# Patient Record
Sex: FEMALE | Race: WHITE | Employment: FULL TIME | URBAN - METROPOLITAN AREA
[De-identification: names, ages, dates, MRNs, and addresses within clinical notes are randomized per-mention and may not be internally consistent; named-entity substitution may affect disease eponyms.]

---

## 2020-12-04 ENCOUNTER — OFFICE VISIT (OUTPATIENT)
Dept: NEUROLOGY | Age: 50
End: 2020-12-04
Payer: COMMERCIAL

## 2020-12-04 VITALS
SYSTOLIC BLOOD PRESSURE: 110 MMHG | HEIGHT: 67 IN | WEIGHT: 204.3 LBS | RESPIRATION RATE: 18 BRPM | HEART RATE: 83 BPM | DIASTOLIC BLOOD PRESSURE: 74 MMHG | OXYGEN SATURATION: 98 % | BODY MASS INDEX: 32.07 KG/M2 | TEMPERATURE: 97 F

## 2020-12-04 DIAGNOSIS — M54.12 RADICULOPATHY, CERVICAL REGION: Primary | ICD-10-CM

## 2020-12-04 PROCEDURE — 99204 OFFICE O/P NEW MOD 45 MIN: CPT | Performed by: SPECIALIST

## 2020-12-04 RX ORDER — GABAPENTIN 100 MG/1
CAPSULE ORAL
Qty: 90 CAP | Refills: 1 | Status: SHIPPED | OUTPATIENT
Start: 2020-12-04

## 2020-12-04 RX ORDER — IBUPROFEN 200 MG
400 TABLET ORAL AS NEEDED
COMMUNITY

## 2020-12-04 RX ORDER — AMMONIUM LACTATE 12 G/100G
LOTION TOPICAL AS NEEDED
COMMUNITY

## 2020-12-04 RX ORDER — CHOLECALCIFEROL (VITAMIN D3) 125 MCG
CAPSULE ORAL AS NEEDED
COMMUNITY

## 2020-12-04 RX ORDER — ACETAMINOPHEN 500 MG
1000 TABLET ORAL AS NEEDED
COMMUNITY

## 2020-12-04 RX ORDER — CETIRIZINE HYDROCHLORIDE 10 MG/1
CAPSULE, LIQUID FILLED ORAL
COMMUNITY
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
## 2020-12-04 NOTE — PATIENT INSTRUCTIONS
RESULT POLICY      If we have ordered testing for you, know that; \"NO NEWS IS GOOD NEWS! \"     It is our policy that we no longer call patients with results, nor do we  give test results over the phone. We schedule follow up appointments so that your results can be discussed in person. This allows you to address any questions you have regarding the results. If you choose to go to an imaging center outside of [facility], it is your responsibility to bring imaging report and disc to follow up appointment. If something of concern is revealed on your test, we will contact you to discuss the matter and if needed schedule a sooner follow up appointment. Additionally, results may be found by using the My Chart feature and one of our patient service representatives at the  can give you instructions on how to access this feature to utilize our electronic medical record system. Thank you for your understanding. Patient history reviewed patient examined. An interesting story line and I guess the symptoms including right neck and shoulder discomfort pinpoint more of a radicular pattern of injury although carpal tunnel could be weakly in the running as another possibility. Will refer to PT issue lowest dose gabapentin as needed and warned of sedation. We will set up EMG and nerve conduction and see if we can differentiate at what level the right upper extremity and neck symptoms appear to originate from. [facility]  Further suggestions could follow.

## 2020-12-04 NOTE — LETTER
12/4/20 Patient: Andrea Walter YOB: 1970 Date of Visit: 12/4/2020 [address] [place] [address] [phone] [id number] VIA Facsimile: [phone] Dear Alysia Ortiz DO, Thank you for referring Ms. Andera Walter to [facility] for evaluation. My notes for this consultation are attached. If you have questions, please do not hesitate to call me. I look forward to following your patient along with you.  
 
 
Sincerely, 
 
Gina Horner MD

## 2020-12-15 ENCOUNTER — OFFICE VISIT (OUTPATIENT)
Dept: NEUROLOGY | Age: 50
End: 2020-12-15

## 2020-12-15 VITALS — TEMPERATURE: 96.8 F

## 2020-12-15 DIAGNOSIS — M54.12 CERVICAL RADICULOPATHY AT C6: Primary | ICD-10-CM
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
## 2020-12-15 NOTE — PROGRESS NOTES
EMG/ NCS Report  [facility] - INPATIENT  [place]  [place], [address] [place], [address]   Ph: [phone]   FAX: [phone]/ [phone]  Test Date:  12/15/2020    Patient: Antonia Avila : 1970 Physician: Rebecca Hutchison MD   Sex: Female Height: ' \" Ref Phys: Rigo House IV, MD   ID#: [id number] Weight:  lbs. Technician: Sheree Ugarte     Patient History / Exam:  CC:INDEX AND THUMB PAIN FOR THE LAST 3 WEEKS. PT. ALSO C/O OF SHOULDER PAIN. EMG & NCV Findings:  Evaluation of the right median motor nerve showed normal distal onset latency (3.7 ms), normal amplitude (8.5 mV), and normal conduction velocity (Elbow-Wrist, 53 m/s). The right ulnar motor nerve showed normal distal onset latency (2.5 ms), normal amplitude (8.6 mV), normal conduction velocity (B Elbow-Wrist, 66 m/s), and normal conduction velocity (A Elbow-B Elbow, 63 m/s). The right median sensory, the right radial sensory, and the right ulnar sensory nerves showed normal distal peak latency (R3.0, R1.8, R2.9 ms) and normal amplitude (R46.3, R41.3, R38.7 µV). The right median/ulnar (palm) comparison nerve showed normal distal onset latency (Median Palm, 1.4 ms), normal distal peak latency (Median Palm, 1.9 ms), normal amplitude (Median Palm, 75.9 µV), normal distal onset latency (Ulnar Palm, 1.2 ms), normal distal peak latency (Ulnar Palm, 1.6 ms), and normal amplitude (Ulnar Palm, 31.0 µV). All F Wave latencies were within normal limits. Needle evaluation of the right mid cervical paraspinal muscle showed increased insertional activity. All remaining muscles (as indicated in the following table) showed no evidence of electrical instability.         Impression:        ___________________________  Rigo House IV, MD      Nerve Conduction Studies  Anti Sensory Summary Table     Stim Site NR Peak (ms) Norm Peak (ms) P-T Amp (µV) Norm P-T Amp Site1 Site2 Dist (cm)   Right Median Anti Sensory (2nd Digit)   Wrist    3.0 <4 46.3 >13 Wrist 2nd Digit 14.0   Elbow    3.0  52.4  Elbow Wrist 0.0   Right Radial Anti Sensory (Base 1st Digit)   Wrist    1.8 <2.8 41.3 >11 Wrist Base 1st Digit 10.0   Site 2    1.8  38.9       Right Ulnar Anti Sensory (5th Digit)   Wrist    2.9 <4.0 38.7 >9 Wrist 5th Digit 14.0   B Elbow    3.0  39.5  B Elbow Wrist 0.0     Motor Summary Table     Stim Site NR Onset (ms) Norm Onset (ms) O-P Amp (mV) Norm O-P Amp Amp (Prev) (%) Site1 Site2 Dist (cm) Jim (m/s) Norm Jim (m/s)   Right Median Motor (Abd Poll Brev)   Wrist    3.7 <4.5 8.5 >4.1 100.0 Wrist Abd Poll Brev 8.0     Elbow    7.3  8.9  104.7 Elbow Wrist 19.0 53 >49   Right Ulnar Motor (Abd Dig Minimi)   Wrist    2.5 <3.1 8.6 >7.0 100.0 Wrist Abd Dig Minimi 8.0  >50   B Elbow    5.4  7.2  83.7 B Elbow Wrist 19.0 66 >50   A Elbow    7.0  7.0  97.2 A Elbow B Elbow 10.0 63 >50     Comparison Summary Table     Stim Site NR Peak (ms) P-T Amp (µV) Site1 Site2 Dist (cm) Delta-0 (ms)   Right Median/Ulnar Palm Comparison (Wrist)   Median Palm    1.9 98.4 Median Palm Ulnar Palm 8.0 0.2   Ulnar Palm    1.6 39.3         F Wave Studies     NR F-Lat (ms) Lat Norm (ms) L-R F-Lat (ms) L-R Lat Norm   Right Ulnar (Mrkrs) (Abd Dig Min)      25.06 <32  <2.5     EMG     Side Muscle Nerve Root Ins Act Fibs Psw Recrt Duration Amp Poly Comment   Right 1stDorInt Ulnar C8-T1 Nml Nml Nml Nml Nml Nml Nml    Right Abd Poll Brev Median C8-T1 Nml Nml Nml Nml Nml Nml Nml    Right BrachioRad Radial C5-6 Nml Nml Nml Nml Nml Nml Nml    Right Triceps Radial C6-7-8 Nml Nml Nml Nml Nml Nml Nml    Right Biceps Musculocut C5-6 Nml Nml Nml Nml Nml Nml Nml    Right Deltoid Axillary C5-6 Nml Nml Nml Nml Nml Nml Nml    Right Lower Cerv Parasp Rami C7,T1 Nml Nml Nml Nml Nml Nml Nml    Right Mid Cerv Parasp Rami C5,6 Incr Nml Nml Nml Nml Nml Nml                Nerve Conduction Studies  Anti Sensory Left/Right Comparison     Stim Site L Lat (ms) R Lat (ms) L-R Lat (ms) L Amp (µV) R Amp (µV) L-R Amp (%) Site1 Site2 L Jim (m/s) R Jim (m/s) L-R Jim (m/s)   Median Anti Sensory (2nd Digit)   Wrist  2.3   46.3  Wrist 2nd Digit  61    Elbow  2.4   52.4  Elbow Wrist      Radial Anti Sensory (Base 1st Digit)   Wrist  1.3   41.3  Wrist Base 1st Digit  77    Site 2  1.2   38.9         Ulnar Anti Sensory (5th Digit)   Wrist  2.3   38.7  Wrist 5th Digit  61    B Elbow  2.4   39.5  B Elbow Wrist        Motor Left/Right Comparison     Stim Site L Lat (ms) R Lat (ms) L-R Lat (ms) L Amp (mV) R Amp (mV) L-R Amp (%) Site1 Site2 L Jim (m/s) R Jim (m/s) L-R Jim (m/s)   Median Motor (Abd Poll Brev)   Wrist  3.7   8.5  Wrist Abd Poll Brev      Elbow  7.3   8.9  Elbow Wrist  53    Ulnar Motor (Abd Dig Minimi)   Wrist  2.5   8.6  Wrist Abd Dig Minimi      B Elbow  5.4   7.2  B Elbow Wrist  66    A Elbow  7.0   7.0  A Elbow B Elbow  63      Comparison Left/Right Comparison     Stim Site L Lat (ms) R Lat (ms) L-R Lat (ms) L Amp (µV) R Amp (µV) L-R Amp (%)   Median/Ulnar Palm Comparison (Wrist)   Median Palm  1.4   75.9    Ulnar Palm  1.2   31.0          Waveforms:

## 2021-01-25 ENCOUNTER — TRANSCRIBE ORDER (OUTPATIENT)
Dept: SCHEDULING | Age: 51
End: 2021-01-25

## 2021-01-25 DIAGNOSIS — Z12.31 VISIT FOR SCREENING MAMMOGRAM: Primary | ICD-10-CM

## 2021-02-12 ENCOUNTER — HOSPITAL ENCOUNTER (OUTPATIENT)
Dept: MAMMOGRAPHY | Age: 51
Discharge: HOME | End: 2021-02-12
Attending: OBSTETRICS & GYNECOLOGY
Payer: COMMERCIAL

## 2021-02-12 DIAGNOSIS — Z12.31 VISIT FOR SCREENING MAMMOGRAM: ICD-10-CM

## 2021-02-12 PROCEDURE — 77063 BREAST TOMOSYNTHESIS BI: CPT

## 2022-07-22 ENCOUNTER — HOSPITAL ENCOUNTER (EMERGENCY)
Age: 52
Discharge: HOME | End: 2022-07-23
Attending: EMERGENCY MEDICINE
Payer: COMMERCIAL

## 2022-07-22 DIAGNOSIS — R07.89 CHEST WALL PAIN: Primary | ICD-10-CM

## 2022-07-22 DIAGNOSIS — F41.1 ANXIETY STATE: ICD-10-CM

## 2022-07-22 PROCEDURE — 99284 EMERGENCY DEPT VISIT MOD MDM: CPT

## 2022-07-23 ENCOUNTER — APPOINTMENT (OUTPATIENT)
Dept: GENERAL RADIOLOGY | Age: 52
End: 2022-07-23
Attending: EMERGENCY MEDICINE
Payer: COMMERCIAL

## 2022-07-23 VITALS
OXYGEN SATURATION: 98 % | RESPIRATION RATE: 16 BRPM | TEMPERATURE: 98.8 F | HEART RATE: 67 BPM | DIASTOLIC BLOOD PRESSURE: 74 MMHG | HEIGHT: 66 IN | WEIGHT: 225 LBS | SYSTOLIC BLOOD PRESSURE: 120 MMHG | BODY MASS INDEX: 36.16 KG/M2

## 2022-07-23 LAB
ALBUMIN SERPL-MCNC: 3.5 G/DL (ref 3.5–5)
ALBUMIN/GLOB SERPL: 0.9 {RATIO} (ref 1.1–2.2)
ALP SERPL-CCNC: 90 U/L (ref 45–117)
ALT SERPL-CCNC: 23 U/L (ref 12–78)
ANION GAP SERPL CALC-SCNC: 5 MMOL/L (ref 5–15)
AST SERPL-CCNC: 15 U/L (ref 15–37)
BASOPHILS # BLD: 0.1 K/UL (ref 0–0.1)
BASOPHILS NFR BLD: 1 % (ref 0–1)
BILIRUB SERPL-MCNC: 0.3 MG/DL (ref 0.2–1)
BUN SERPL-MCNC: 13 MG/DL (ref 6–20)
BUN/CREAT SERPL: 19 (ref 12–20)
CALCIUM SERPL-MCNC: 9.2 MG/DL (ref 8.5–10.1)
CHLORIDE SERPL-SCNC: 111 MMOL/L (ref 97–108)
CO2 SERPL-SCNC: 29 MMOL/L (ref 21–32)
COMMENT, HOLDF: NORMAL
CREAT SERPL-MCNC: 0.68 MG/DL (ref 0.55–1.02)
DIFFERENTIAL METHOD BLD: NORMAL
EOSINOPHIL # BLD: 0.2 K/UL (ref 0–0.4)
EOSINOPHIL NFR BLD: 3 % (ref 0–7)
ERYTHROCYTE [DISTWIDTH] IN BLOOD BY AUTOMATED COUNT: 13.4 % (ref 11.5–14.5)
GLOBULIN SER CALC-MCNC: 3.8 G/DL (ref 2–4)
GLUCOSE SERPL-MCNC: 126 MG/DL (ref 65–100)
HCT VFR BLD AUTO: 39.1 % (ref 35–47)
HGB BLD-MCNC: 12.8 G/DL (ref 11.5–16)
IMM GRANULOCYTES # BLD AUTO: 0 K/UL (ref 0–0.04)
IMM GRANULOCYTES NFR BLD AUTO: 0 % (ref 0–0.5)
LIPASE SERPL-CCNC: 146 U/L (ref 73–393)
LYMPHOCYTES # BLD: 2.3 K/UL (ref 0.8–3.5)
LYMPHOCYTES NFR BLD: 36 % (ref 12–49)
MCH RBC QN AUTO: 28.3 PG (ref 26–34)
MCHC RBC AUTO-ENTMCNC: 32.7 G/DL (ref 30–36.5)
MCV RBC AUTO: 86.5 FL (ref 80–99)
MONOCYTES # BLD: 0.3 K/UL (ref 0–1)
MONOCYTES NFR BLD: 5 % (ref 5–13)
NEUTS SEG # BLD: 3.6 K/UL (ref 1.8–8)
NEUTS SEG NFR BLD: 55 % (ref 32–75)
NRBC # BLD: 0 K/UL (ref 0–0.01)
NRBC BLD-RTO: 0 PER 100 WBC
PLATELET # BLD AUTO: 257 K/UL (ref 150–400)
PMV BLD AUTO: 10.7 FL (ref 8.9–12.9)
POTASSIUM SERPL-SCNC: 3.8 MMOL/L (ref 3.5–5.1)
PROT SERPL-MCNC: 7.3 G/DL (ref 6.4–8.2)
RBC # BLD AUTO: 4.52 M/UL (ref 3.8–5.2)
SAMPLES BEING HELD,HOLD: NORMAL
SODIUM SERPL-SCNC: 145 MMOL/L (ref 136–145)
TROPONIN-HIGH SENSITIVITY: <4 NG/L (ref 0–51)
WBC # BLD AUTO: 6.5 K/UL (ref 3.6–11)

## 2022-07-23 PROCEDURE — 74011250637 HC RX REV CODE- 250/637: Performed by: EMERGENCY MEDICINE

## 2022-07-23 PROCEDURE — 84484 ASSAY OF TROPONIN QUANT: CPT

## 2022-07-23 PROCEDURE — 83690 ASSAY OF LIPASE: CPT

## 2022-07-23 PROCEDURE — 85025 COMPLETE CBC W/AUTO DIFF WBC: CPT

## 2022-07-23 PROCEDURE — 36415 COLL VENOUS BLD VENIPUNCTURE: CPT

## 2022-07-23 PROCEDURE — 80053 COMPREHEN METABOLIC PANEL: CPT

## 2022-07-23 PROCEDURE — 71046 X-RAY EXAM CHEST 2 VIEWS: CPT

## 2022-07-23 RX ORDER — HYDROXYZINE 25 MG/1
25 TABLET, FILM COATED ORAL
Status: COMPLETED | OUTPATIENT
Start: 2022-07-23 | End: 2022-07-23

## 2022-07-23 RX ORDER — HYDROXYZINE 25 MG/1
25-50 TABLET, FILM COATED ORAL
Qty: 40 TABLET | Refills: 0 | Status: SHIPPED | OUTPATIENT
Start: 2022-07-23 | End: 2022-08-02

## 2022-07-23 RX ADMIN — HYDROXYZINE HYDROCHLORIDE 25 MG: 25 TABLET, FILM COATED ORAL at 01:49

## 2022-07-23 NOTE — ED TRIAGE NOTES
Patient arrives to ED with complaints of chest pain that radiates to her back     Patient unsure if it is anxiety     Patient reporting she has had

## 2022-07-23 NOTE — ED PROVIDER NOTES
Date of Service:  7/22/2022    Patient:  Harlan Toro    Chief Complaint:  Chest Pain       HPI:  Harlan Toro is a 47 y.o.  female who presents for evaluation of   Chest discomfort. Patient states that over the last several days she been having intermittent chest pain left-sided midsternal and through to her back. She states mostly like a fluttering type sensation. 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 She is concerned that this could just all be stress related. She denies shortness of breath fevers chills nausea vomiting shortness of breath or other acute complaints. Chest pain seems to be intermittent, no real time of day. 6 out of 10       Past Medical History:   Diagnosis Date    Contact dermatitis and eczema due to cause     Muscle pain        No past surgical history on file.       Family History:   Problem Relation Age of Onset    Mult Sclerosis Mother     Neuropathy Mother     Heart Disease Father     Cancer Father     Cancer Brother        Social History     Socioeconomic History    Marital status:      Spouse name: Not on file    Number of children: Not on file    Years of education: Not on file    Highest education level: Not on file   Occupational History    Not on file   Tobacco Use    Smoking status: Never    Smokeless tobacco: Never   Substance and Sexual Activity    Alcohol use: Not Currently    Drug use: Not on file    Sexual activity: Not on file   Other Topics Concern    Not on file   Social History Narrative    Not on file     Social Determinants of Health     Financial Resource Strain: Not on file   Food Insecurity: Not on file   Transportation Needs: Not on file   Physical Activity: Not on file   Stress: Not on file   Social Connections: Not on file   Intimate Partner Violence: Not on file   Housing Stability: Not on file         ALLERGIES: Guaifenesin and Pcn [penicillins]    Review of Systems   Cardiovascular:  Positive for chest pain. Psychiatric/Behavioral:  The patient is nervous/anxious. All other systems reviewed and are negative. Vitals:    07/22/22 2343   BP: 123/81   Pulse: 79   Resp: 19   Temp: 98.8 °F (37.1 °C)   SpO2: 97%   Weight: 102.1 kg (225 lb)   Height: 5' 6\" (1.676 m)            Physical Exam  Vitals and nursing note reviewed. Constitutional:       General: She is not in acute distress. Appearance: She is well-developed. She is not ill-appearing, toxic-appearing or diaphoretic. HENT:      Head: Normocephalic. Cardiovascular:      Rate and Rhythm: Normal rate and regular rhythm. Heart sounds: Normal heart sounds. Pulmonary:      Effort: Pulmonary effort is normal.      Breath sounds: Normal breath sounds. Chest:      Chest wall: No tenderness. Abdominal:      Palpations: Abdomen is soft. Musculoskeletal:      Right lower leg: No edema. Left lower leg: No edema. Skin:     General: Skin is warm. Capillary Refill: Capillary refill takes less than 2 seconds. Neurological:      Mental Status: She is alert and oriented to person, place, and time. Psychiatric:         Mood and Affect: Mood is anxious. [facility]    ED Course as of 07/23/22 0615   Fri Jul 22, 2022   2351 EKG 2335  Normal sinus rhythm at 81 bpm.  Normal axis. Normal intervals. Normal EKG. No STEMI. [GG]      ED Course User Index  [GG] Cheri Savage DO     VITAL SIGNS:  No data found. LABS:  No results found for this or any previous visit (from the past 6 hour(s)). IMAGING:  XR CHEST PA LAT   Final Result   Normal chest.            Medications During Visit:  Medications   hydrOXYzine HCL (ATARAX) tablet 25 mg (25 mg Oral Given 7/23/22 0149)         DECISION MAKING:  Megan Negro is a 47 y.o. female who comes in as above. Here, patient appears well.   Diagnostic grossly unremarkable. Patient well-appearing but does appear anxious. I believe there is an anxiety component to the patient's myriad of complaints. We will start her on Atarax and have her follow with her PCP. At this time she has no signs of distress      IMPRESSION:  1. Chest wall pain    2. Anxiety state        DISPOSITION:  Discharged      Discharge Medication List as of 7/23/2022  1:44 AM        START taking these medications    Details   hydrOXYzine HCL (ATARAX) 25 mg tablet Take 1-2 Tablets by mouth every six (6) hours as needed for Anxiety for up to 10 days. , Print, Disp-40 Tablet, R-0           CONTINUE these medications which have NOT CHANGED    Details   acetaminophen (Tylenol Extra Strength) 500 mg tablet Take 1,000 mg by mouth as needed for Pain., Historical Med      naproxen sodium (Aleve) 220 mg cap Take  by mouth as needed., Historical Med      ibuprofen (MOTRIN) 200 mg tablet Take 400 mg by mouth as needed for Pain., Historical Med      Cetirizine (ZyrTEC) 10 mg cap Take  by mouth., Historical Med      diphenhydramine HCl (BENADRYL PO) Take  by mouth as needed., Historical Med      ammonium lactate (LAC-HYDRIN) 12 % lotion Apply  to affected area as needed. rub in to affected area well, Historical Med      gabapentin (NEURONTIN) 100 mg capsule 1 po tid prn  Indications: neuropathic pain, Print, Disp-90 Cap,R-1              Follow-up Information       Follow up With Specialties Details Why Contact Info    Debbie Jordan, DO Family Medicine Schedule an appointment as soon as possible for a visit   [address] [place] [address] [phone]                The patient is asked to follow-up with their primary care provider in the next several days. They are to call tomorrow for an appointment. The patient is asked to return promptly for any increased concerns or worsening of symptoms.   They can return to this emergency department or any other emergency department.   Procedures

## 2023-12-09 ENCOUNTER — HOSPITAL ENCOUNTER (INPATIENT)
Facility: HOSPITAL | Age: 53
LOS: 4 days | Discharge: HOME | DRG: 330 | End: 2023-12-14
Attending: EMERGENCY MEDICINE | Admitting: SURGERY
Payer: COMMERCIAL

## 2023-12-09 DIAGNOSIS — K66.8 PNEUMOPERITONEUM: ICD-10-CM

## 2023-12-09 DIAGNOSIS — R19.8 PERFORATED VISCUS: Primary | ICD-10-CM

## 2023-12-09 DIAGNOSIS — K55.059 ACUTE INTESTINAL ISCHEMIA (HCC): ICD-10-CM

## 2023-12-09 DIAGNOSIS — K57.92 ACUTE DIVERTICULITIS: ICD-10-CM

## 2023-12-09 DIAGNOSIS — G89.18 ACUTE POSTOPERATIVE PAIN: ICD-10-CM

## 2023-12-09 LAB
ALBUMIN SERPL-MCNC: 3.2 G/DL (ref 3.5–5)
ALBUMIN/GLOB SERPL: 0.7 (ref 1.1–2.2)
ALP SERPL-CCNC: 94 U/L (ref 45–117)
ALT SERPL-CCNC: 19 U/L (ref 12–78)
ANION GAP SERPL CALC-SCNC: 8 MMOL/L (ref 5–15)
AST SERPL-CCNC: 16 U/L (ref 15–37)
BASOPHILS # BLD: 0 K/UL (ref 0–0.1)
BASOPHILS NFR BLD: 0 % (ref 0–1)
BILIRUB SERPL-MCNC: 0.8 MG/DL (ref 0.2–1)
BUN SERPL-MCNC: 9 MG/DL (ref 6–20)
BUN/CREAT SERPL: 13 (ref 12–20)
CALCIUM SERPL-MCNC: 9.2 MG/DL (ref 8.5–10.1)
CHLORIDE SERPL-SCNC: 104 MMOL/L (ref 97–108)
CO2 SERPL-SCNC: 25 MMOL/L (ref 21–32)
COMMENT:: NORMAL
CREAT SERPL-MCNC: 0.69 MG/DL (ref 0.55–1.02)
DIFFERENTIAL METHOD BLD: ABNORMAL
EOSINOPHIL # BLD: 0.1 K/UL (ref 0–0.4)
EOSINOPHIL NFR BLD: 1 % (ref 0–7)
ERYTHROCYTE [DISTWIDTH] IN BLOOD BY AUTOMATED COUNT: 13.3 % (ref 11.5–14.5)
GLOBULIN SER CALC-MCNC: 4.7 G/DL (ref 2–4)
GLUCOSE SERPL-MCNC: 141 MG/DL (ref 65–100)
HCG UR QL: NEGATIVE
HCT VFR BLD AUTO: 38.9 % (ref 35–47)
HGB BLD-MCNC: 12.8 G/DL (ref 11.5–16)
IMM GRANULOCYTES # BLD AUTO: 0 K/UL (ref 0–0.04)
IMM GRANULOCYTES NFR BLD AUTO: 0 % (ref 0–0.5)
LIPASE SERPL-CCNC: 18 U/L (ref 13–75)
LYMPHOCYTES # BLD: 1 K/UL (ref 0.8–3.5)
LYMPHOCYTES NFR BLD: 11 % (ref 12–49)
MCH RBC QN AUTO: 27.2 PG (ref 26–34)
MCHC RBC AUTO-ENTMCNC: 32.9 G/DL (ref 30–36.5)
MCV RBC AUTO: 82.6 FL (ref 80–99)
MONOCYTES # BLD: 0.5 K/UL (ref 0–1)
MONOCYTES NFR BLD: 5 % (ref 5–13)
NEUTS SEG # BLD: 7.5 K/UL (ref 1.8–8)
NEUTS SEG NFR BLD: 83 % (ref 32–75)
NRBC # BLD: 0 K/UL (ref 0–0.01)
NRBC BLD-RTO: 0 PER 100 WBC
PLATELET # BLD AUTO: 352 K/UL (ref 150–400)
PMV BLD AUTO: 10.6 FL (ref 8.9–12.9)
POTASSIUM SERPL-SCNC: 3.3 MMOL/L (ref 3.5–5.1)
PROT SERPL-MCNC: 7.9 G/DL (ref 6.4–8.2)
RBC # BLD AUTO: 4.71 M/UL (ref 3.8–5.2)
SODIUM SERPL-SCNC: 137 MMOL/L (ref 136–145)
SPECIMEN HOLD: NORMAL
WBC # BLD AUTO: 9.1 K/UL (ref 3.6–11)

## 2023-12-09 PROCEDURE — 84484 ASSAY OF TROPONIN QUANT: CPT

## 2023-12-09 PROCEDURE — 80053 COMPREHEN METABOLIC PANEL: CPT

## 2023-12-09 PROCEDURE — 81001 URINALYSIS AUTO W/SCOPE: CPT

## 2023-12-09 PROCEDURE — 96375 TX/PRO/DX INJ NEW DRUG ADDON: CPT

## 2023-12-09 PROCEDURE — 99285 EMERGENCY DEPT VISIT HI MDM: CPT

## 2023-12-09 PROCEDURE — 83690 ASSAY OF LIPASE: CPT

## 2023-12-09 PROCEDURE — 83880 ASSAY OF NATRIURETIC PEPTIDE: CPT

## 2023-12-09 PROCEDURE — 81025 URINE PREGNANCY TEST: CPT

## 2023-12-09 PROCEDURE — 36415 COLL VENOUS BLD VENIPUNCTURE: CPT

## 2023-12-09 PROCEDURE — 85025 COMPLETE CBC W/AUTO DIFF WBC: CPT

## 2023-12-09 PROCEDURE — 96376 TX/PRO/DX INJ SAME DRUG ADON: CPT

## 2023-12-09 PROCEDURE — 96365 THER/PROPH/DIAG IV INF INIT: CPT

## 2023-12-09 RX ORDER — DIPHENHYDRAMINE HYDROCHLORIDE 50 MG/ML
25 INJECTION INTRAMUSCULAR; INTRAVENOUS
Status: COMPLETED | OUTPATIENT
Start: 2023-12-10 | End: 2023-12-10

## 2023-12-09 RX ORDER — 0.9 % SODIUM CHLORIDE 0.9 %
1000 INTRAVENOUS SOLUTION INTRAVENOUS ONCE
Status: COMPLETED | OUTPATIENT
Start: 2023-12-10 | End: 2023-12-10

## 2023-12-09 RX ORDER — DROPERIDOL 2.5 MG/ML
2.5 INJECTION, SOLUTION INTRAMUSCULAR; INTRAVENOUS ONCE
Status: COMPLETED | OUTPATIENT
Start: 2023-12-10 | End: 2023-12-10

## 2023-12-09 ASSESSMENT — PAIN SCALES - GENERAL: PAINLEVEL_OUTOF10: 7

## 2023-12-09 ASSESSMENT — PAIN - FUNCTIONAL ASSESSMENT: PAIN_FUNCTIONAL_ASSESSMENT: 0-10

## 2023-12-09 ASSESSMENT — PAIN DESCRIPTION - ORIENTATION: ORIENTATION: MID

## 2023-12-09 ASSESSMENT — PAIN DESCRIPTION - LOCATION: LOCATION: ABDOMEN

## 2023-12-09 ASSESSMENT — PAIN DESCRIPTION - PAIN TYPE: TYPE: ACUTE PAIN

## 2023-12-10 ENCOUNTER — ANESTHESIA (OUTPATIENT)
Facility: HOSPITAL | Age: 53
End: 2023-12-10
Payer: COMMERCIAL

## 2023-12-10 ENCOUNTER — APPOINTMENT (OUTPATIENT)
Facility: HOSPITAL | Age: 53
DRG: 330 | End: 2023-12-10
Payer: COMMERCIAL

## 2023-12-10 ENCOUNTER — ANESTHESIA EVENT (OUTPATIENT)
Facility: HOSPITAL | Age: 53
End: 2023-12-10
Payer: COMMERCIAL

## 2023-12-10 PROBLEM — K57.33 DIVERTICULITIS LARGE INTESTINE W/O PERFORATION OR ABSCESS W/BLEEDING: Status: ACTIVE | Noted: 2023-12-10

## 2023-12-10 PROBLEM — R10.9 ABDOMINAL PAIN: Status: ACTIVE | Noted: 2023-12-10

## 2023-12-10 LAB
ABO + RH BLD: NORMAL
APPEARANCE UR: ABNORMAL
APTT PPP: 27.9 SEC (ref 22.1–31)
BACTERIA URNS QL MICRO: NEGATIVE /HPF
BILIRUB UR QL: NEGATIVE
BLOOD GROUP ANTIBODIES SERPL: NORMAL
COLOR UR: ABNORMAL
EKG ATRIAL RATE: 108 BPM
EKG DIAGNOSIS: NORMAL
EKG P AXIS: 26 DEGREES
EKG P-R INTERVAL: 136 MS
EKG Q-T INTERVAL: 328 MS
EKG QRS DURATION: 86 MS
EKG QTC CALCULATION (BAZETT): 439 MS
EKG R AXIS: -11 DEGREES
EKG T AXIS: 24 DEGREES
EKG VENTRICULAR RATE: 108 BPM
EPITH CASTS URNS QL MICRO: ABNORMAL /LPF
GLUCOSE UR STRIP.AUTO-MCNC: NEGATIVE MG/DL
HGB UR QL STRIP: ABNORMAL
INR PPP: 1.2 (ref 0.9–1.1)
KETONES UR QL STRIP.AUTO: 15 MG/DL
LACTATE BLD-SCNC: 1.91 MMOL/L (ref 0.4–2)
LEUKOCYTE ESTERASE UR QL STRIP.AUTO: ABNORMAL
NITRITE UR QL STRIP.AUTO: NEGATIVE
NT PRO BNP: 116 PG/ML
PH UR STRIP: 5.5 (ref 5–8)
PROT UR STRIP-MCNC: 30 MG/DL
PROTHROMBIN TIME: 12.2 SEC (ref 9–11.1)
RBC #/AREA URNS HPF: ABNORMAL /HPF (ref 0–5)
SP GR UR REFRACTOMETRY: 1.02 (ref 1–1.03)
SPECIMEN EXP DATE BLD: NORMAL
THERAPEUTIC RANGE: NORMAL SECS (ref 58–77)
TROPONIN I SERPL HS-MCNC: <4 NG/L (ref 0–51)
UROBILINOGEN UR QL STRIP.AUTO: 1 EU/DL (ref 0.2–1)
WBC URNS QL MICRO: ABNORMAL /HPF (ref 0–4)

## 2023-12-10 PROCEDURE — 83605 ASSAY OF LACTIC ACID: CPT

## 2023-12-10 PROCEDURE — 2720000010 HC SURG SUPPLY STERILE: Performed by: SURGERY

## 2023-12-10 PROCEDURE — 94761 N-INVAS EAR/PLS OXIMETRY MLT: CPT

## 2023-12-10 PROCEDURE — 93005 ELECTROCARDIOGRAM TRACING: CPT | Performed by: EMERGENCY MEDICINE

## 2023-12-10 PROCEDURE — 86901 BLOOD TYPING SEROLOGIC RH(D): CPT

## 2023-12-10 PROCEDURE — 2580000003 HC RX 258: Performed by: SURGERY

## 2023-12-10 PROCEDURE — 2500000003 HC RX 250 WO HCPCS: Performed by: NURSE ANESTHETIST, CERTIFIED REGISTERED

## 2023-12-10 PROCEDURE — 6360000002 HC RX W HCPCS: Performed by: SURGERY

## 2023-12-10 PROCEDURE — 6360000002 HC RX W HCPCS: Performed by: ANESTHESIOLOGY

## 2023-12-10 PROCEDURE — 3600000014 HC SURGERY LEVEL 4 ADDTL 15MIN: Performed by: SURGERY

## 2023-12-10 PROCEDURE — 85730 THROMBOPLASTIN TIME PARTIAL: CPT

## 2023-12-10 PROCEDURE — 3700000001 HC ADD 15 MINUTES (ANESTHESIA): Performed by: SURGERY

## 2023-12-10 PROCEDURE — 2709999900 HC NON-CHARGEABLE SUPPLY: Performed by: SURGERY

## 2023-12-10 PROCEDURE — 2580000003 HC RX 258: Performed by: NURSE ANESTHETIST, CERTIFIED REGISTERED

## 2023-12-10 PROCEDURE — 93010 ELECTROCARDIOGRAM REPORT: CPT | Performed by: INTERNAL MEDICINE

## 2023-12-10 PROCEDURE — 85610 PROTHROMBIN TIME: CPT

## 2023-12-10 PROCEDURE — 3700000000 HC ANESTHESIA ATTENDED CARE: Performed by: SURGERY

## 2023-12-10 PROCEDURE — 0DTN0ZZ RESECTION OF SIGMOID COLON, OPEN APPROACH: ICD-10-PCS | Performed by: SURGERY

## 2023-12-10 PROCEDURE — 2580000003 HC RX 258: Performed by: EMERGENCY MEDICINE

## 2023-12-10 PROCEDURE — C1713 ANCHOR/SCREW BN/BN,TIS/BN: HCPCS | Performed by: SURGERY

## 2023-12-10 PROCEDURE — 6360000002 HC RX W HCPCS: Performed by: EMERGENCY MEDICINE

## 2023-12-10 PROCEDURE — 2500000003 HC RX 250 WO HCPCS: Performed by: SURGERY

## 2023-12-10 PROCEDURE — 6360000004 HC RX CONTRAST MEDICATION: Performed by: RADIOLOGY

## 2023-12-10 PROCEDURE — 74177 CT ABD & PELVIS W/CONTRAST: CPT

## 2023-12-10 PROCEDURE — 36415 COLL VENOUS BLD VENIPUNCTURE: CPT

## 2023-12-10 PROCEDURE — 1100000000 HC RM PRIVATE

## 2023-12-10 PROCEDURE — 71045 X-RAY EXAM CHEST 1 VIEW: CPT

## 2023-12-10 PROCEDURE — 6360000002 HC RX W HCPCS: Performed by: NURSE ANESTHETIST, CERTIFIED REGISTERED

## 2023-12-10 PROCEDURE — 7100000000 HC PACU RECOVERY - FIRST 15 MIN: Performed by: SURGERY

## 2023-12-10 PROCEDURE — 86900 BLOOD TYPING SEROLOGIC ABO: CPT

## 2023-12-10 PROCEDURE — P9045 ALBUMIN (HUMAN), 5%, 250 ML: HCPCS | Performed by: ANESTHESIOLOGY

## 2023-12-10 PROCEDURE — 88307 TISSUE EXAM BY PATHOLOGIST: CPT

## 2023-12-10 PROCEDURE — 3600000004 HC SURGERY LEVEL 4 BASE: Performed by: SURGERY

## 2023-12-10 PROCEDURE — 6370000000 HC RX 637 (ALT 250 FOR IP): Performed by: SURGERY

## 2023-12-10 PROCEDURE — 7100000001 HC PACU RECOVERY - ADDTL 15 MIN: Performed by: SURGERY

## 2023-12-10 PROCEDURE — 86850 RBC ANTIBODY SCREEN: CPT

## 2023-12-10 RX ORDER — SODIUM CHLORIDE 0.9 % (FLUSH) 0.9 %
5-40 SYRINGE (ML) INJECTION EVERY 12 HOURS SCHEDULED
Status: DISCONTINUED | OUTPATIENT
Start: 2023-12-10 | End: 2023-12-14 | Stop reason: HOSPADM

## 2023-12-10 RX ORDER — ROCURONIUM BROMIDE 10 MG/ML
INJECTION, SOLUTION INTRAVENOUS PRN
Status: DISCONTINUED | OUTPATIENT
Start: 2023-12-10 | End: 2023-12-10 | Stop reason: SDUPTHER

## 2023-12-10 RX ORDER — ALBUMIN, HUMAN INJ 5% 5 %
25 SOLUTION INTRAVENOUS ONCE
Status: COMPLETED | OUTPATIENT
Start: 2023-12-10 | End: 2023-12-10

## 2023-12-10 RX ORDER — HYDROMORPHONE HYDROCHLORIDE 1 MG/ML
1 INJECTION, SOLUTION INTRAMUSCULAR; INTRAVENOUS; SUBCUTANEOUS
Status: DISCONTINUED | OUTPATIENT
Start: 2023-12-10 | End: 2023-12-11

## 2023-12-10 RX ORDER — 0.9 % SODIUM CHLORIDE 0.9 %
1000 INTRAVENOUS SOLUTION INTRAVENOUS ONCE
Status: COMPLETED | OUTPATIENT
Start: 2023-12-10 | End: 2023-12-10

## 2023-12-10 RX ORDER — DEXTROSE, SODIUM CHLORIDE, SODIUM LACTATE, POTASSIUM CHLORIDE, AND CALCIUM CHLORIDE 5; .6; .31; .03; .02 G/100ML; G/100ML; G/100ML; G/100ML; G/100ML
INJECTION, SOLUTION INTRAVENOUS CONTINUOUS
Status: DISCONTINUED | OUTPATIENT
Start: 2023-12-10 | End: 2023-12-14 | Stop reason: HOSPADM

## 2023-12-10 RX ORDER — HYDROMORPHONE HYDROCHLORIDE 1 MG/ML
1 INJECTION, SOLUTION INTRAMUSCULAR; INTRAVENOUS; SUBCUTANEOUS ONCE
Status: COMPLETED | OUTPATIENT
Start: 2023-12-10 | End: 2023-12-10

## 2023-12-10 RX ORDER — SODIUM CHLORIDE 0.9 % (FLUSH) 0.9 %
5-40 SYRINGE (ML) INJECTION PRN
Status: DISCONTINUED | OUTPATIENT
Start: 2023-12-10 | End: 2023-12-14 | Stop reason: HOSPADM

## 2023-12-10 RX ORDER — ONDANSETRON 2 MG/ML
4 INJECTION INTRAMUSCULAR; INTRAVENOUS EVERY 6 HOURS PRN
Status: DISCONTINUED | OUTPATIENT
Start: 2023-12-10 | End: 2023-12-14 | Stop reason: HOSPADM

## 2023-12-10 RX ORDER — ONDANSETRON 4 MG/1
4 TABLET, ORALLY DISINTEGRATING ORAL EVERY 8 HOURS PRN
Status: DISCONTINUED | OUTPATIENT
Start: 2023-12-10 | End: 2023-12-14 | Stop reason: HOSPADM

## 2023-12-10 RX ORDER — ENOXAPARIN SODIUM 100 MG/ML
30 INJECTION SUBCUTANEOUS 2 TIMES DAILY
Status: DISCONTINUED | OUTPATIENT
Start: 2023-12-11 | End: 2023-12-14 | Stop reason: HOSPADM

## 2023-12-10 RX ORDER — SUCCINYLCHOLINE CHLORIDE 20 MG/ML
INJECTION INTRAMUSCULAR; INTRAVENOUS PRN
Status: DISCONTINUED | OUTPATIENT
Start: 2023-12-10 | End: 2023-12-10 | Stop reason: SDUPTHER

## 2023-12-10 RX ORDER — MEPERIDINE HYDROCHLORIDE 25 MG/ML
12.5 INJECTION INTRAMUSCULAR; INTRAVENOUS; SUBCUTANEOUS EVERY 5 MIN PRN
Status: DISCONTINUED | OUTPATIENT
Start: 2023-12-10 | End: 2023-12-10 | Stop reason: HOSPADM

## 2023-12-10 RX ORDER — ACETAMINOPHEN 325 MG/1
650 TABLET ORAL EVERY 6 HOURS
Status: DISCONTINUED | OUTPATIENT
Start: 2023-12-10 | End: 2023-12-14 | Stop reason: HOSPADM

## 2023-12-10 RX ORDER — CEFAZOLIN SODIUM 1 G/3ML
INJECTION, POWDER, FOR SOLUTION INTRAMUSCULAR; INTRAVENOUS PRN
Status: DISCONTINUED | OUTPATIENT
Start: 2023-12-10 | End: 2023-12-10 | Stop reason: SDUPTHER

## 2023-12-10 RX ORDER — FENTANYL CITRATE 50 UG/ML
INJECTION, SOLUTION INTRAMUSCULAR; INTRAVENOUS PRN
Status: DISCONTINUED | OUTPATIENT
Start: 2023-12-10 | End: 2023-12-10 | Stop reason: SDUPTHER

## 2023-12-10 RX ORDER — FAMOTIDINE 10 MG/ML
INJECTION, SOLUTION INTRAVENOUS PRN
Status: DISCONTINUED | OUTPATIENT
Start: 2023-12-10 | End: 2023-12-10 | Stop reason: SDUPTHER

## 2023-12-10 RX ORDER — FENTANYL CITRATE 50 UG/ML
100 INJECTION, SOLUTION INTRAMUSCULAR; INTRAVENOUS
Status: COMPLETED | OUTPATIENT
Start: 2023-12-10 | End: 2023-12-10

## 2023-12-10 RX ORDER — METRONIDAZOLE 500 MG/100ML
500 INJECTION, SOLUTION INTRAVENOUS EVERY 8 HOURS
Status: DISCONTINUED | OUTPATIENT
Start: 2023-12-10 | End: 2023-12-12

## 2023-12-10 RX ORDER — ONDANSETRON 2 MG/ML
INJECTION INTRAMUSCULAR; INTRAVENOUS PRN
Status: DISCONTINUED | OUTPATIENT
Start: 2023-12-10 | End: 2023-12-10 | Stop reason: SDUPTHER

## 2023-12-10 RX ORDER — MIDAZOLAM HYDROCHLORIDE 1 MG/ML
INJECTION INTRAMUSCULAR; INTRAVENOUS PRN
Status: DISCONTINUED | OUTPATIENT
Start: 2023-12-10 | End: 2023-12-10 | Stop reason: SDUPTHER

## 2023-12-10 RX ORDER — KETOROLAC TROMETHAMINE 30 MG/ML
30 INJECTION, SOLUTION INTRAMUSCULAR; INTRAVENOUS EVERY 6 HOURS
Status: COMPLETED | OUTPATIENT
Start: 2023-12-10 | End: 2023-12-13

## 2023-12-10 RX ORDER — LIDOCAINE HYDROCHLORIDE 20 MG/ML
INJECTION, SOLUTION EPIDURAL; INFILTRATION; INTRACAUDAL; PERINEURAL PRN
Status: DISCONTINUED | OUTPATIENT
Start: 2023-12-10 | End: 2023-12-10 | Stop reason: SDUPTHER

## 2023-12-10 RX ORDER — SODIUM CHLORIDE 9 MG/ML
INJECTION, SOLUTION INTRAVENOUS PRN
Status: DISCONTINUED | OUTPATIENT
Start: 2023-12-10 | End: 2023-12-14 | Stop reason: HOSPADM

## 2023-12-10 RX ORDER — SODIUM CHLORIDE 0.9 % (FLUSH) 0.9 %
5-40 SYRINGE (ML) INJECTION PRN
Status: DISCONTINUED | OUTPATIENT
Start: 2023-12-10 | End: 2023-12-10 | Stop reason: HOSPADM

## 2023-12-10 RX ORDER — SODIUM CHLORIDE, SODIUM LACTATE, POTASSIUM CHLORIDE, CALCIUM CHLORIDE 600; 310; 30; 20 MG/100ML; MG/100ML; MG/100ML; MG/100ML
INJECTION, SOLUTION INTRAVENOUS CONTINUOUS PRN
Status: DISCONTINUED | OUTPATIENT
Start: 2023-12-10 | End: 2023-12-10 | Stop reason: SDUPTHER

## 2023-12-10 RX ORDER — DEXAMETHASONE SODIUM PHOSPHATE 4 MG/ML
4 INJECTION, SOLUTION INTRA-ARTICULAR; INTRALESIONAL; INTRAMUSCULAR; INTRAVENOUS; SOFT TISSUE
Status: DISCONTINUED | OUTPATIENT
Start: 2023-12-10 | End: 2023-12-10 | Stop reason: HOSPADM

## 2023-12-10 RX ORDER — FAMOTIDINE 20 MG/1
20 TABLET, FILM COATED ORAL 2 TIMES DAILY
Status: DISCONTINUED | OUTPATIENT
Start: 2023-12-10 | End: 2023-12-14 | Stop reason: HOSPADM

## 2023-12-10 RX ORDER — DEXAMETHASONE SODIUM PHOSPHATE 4 MG/ML
INJECTION, SOLUTION INTRA-ARTICULAR; INTRALESIONAL; INTRAMUSCULAR; INTRAVENOUS; SOFT TISSUE PRN
Status: DISCONTINUED | OUTPATIENT
Start: 2023-12-10 | End: 2023-12-10 | Stop reason: SDUPTHER

## 2023-12-10 RX ORDER — PROPOFOL 10 MG/ML
INJECTION, EMULSION INTRAVENOUS PRN
Status: DISCONTINUED | OUTPATIENT
Start: 2023-12-10 | End: 2023-12-10 | Stop reason: SDUPTHER

## 2023-12-10 RX ADMIN — FENTANYL CITRATE 50 MCG: 50 INJECTION, SOLUTION INTRAMUSCULAR; INTRAVENOUS at 06:30

## 2023-12-10 RX ADMIN — METRONIDAZOLE 500 MG: 500 INJECTION, SOLUTION INTRAVENOUS at 10:38

## 2023-12-10 RX ADMIN — HYDROMORPHONE HYDROCHLORIDE 0.5 MG: 1 INJECTION, SOLUTION INTRAMUSCULAR; INTRAVENOUS; SUBCUTANEOUS at 14:13

## 2023-12-10 RX ADMIN — ALBUMIN (HUMAN) 25 G: 12.5 INJECTION, SOLUTION INTRAVENOUS at 09:44

## 2023-12-10 RX ADMIN — FAMOTIDINE 20 MG: 10 INJECTION, SOLUTION INTRAVENOUS at 06:10

## 2023-12-10 RX ADMIN — ROCURONIUM BROMIDE 10 MG: 10 INJECTION, SOLUTION INTRAVENOUS at 06:21

## 2023-12-10 RX ADMIN — FAMOTIDINE 20 MG: 20 TABLET ORAL at 12:58

## 2023-12-10 RX ADMIN — METRONIDAZOLE 500 MG: 500 INJECTION, SOLUTION INTRAVENOUS at 18:02

## 2023-12-10 RX ADMIN — SODIUM CHLORIDE, POTASSIUM CHLORIDE, SODIUM LACTATE AND CALCIUM CHLORIDE: 600; 310; 30; 20 INJECTION, SOLUTION INTRAVENOUS at 06:20

## 2023-12-10 RX ADMIN — SODIUM CHLORIDE, SODIUM LACTATE, POTASSIUM CHLORIDE, CALCIUM CHLORIDE: 600; 310; 30; 20 INJECTION, SOLUTION INTRAVENOUS at 06:30

## 2023-12-10 RX ADMIN — DIPHENHYDRAMINE HYDROCHLORIDE 25 MG: 50 INJECTION INTRAMUSCULAR; INTRAVENOUS at 00:11

## 2023-12-10 RX ADMIN — SUCCINYLCHOLINE CHLORIDE 160 MG: 20 INJECTION, SOLUTION INTRAMUSCULAR; INTRAVENOUS at 06:21

## 2023-12-10 RX ADMIN — SODIUM CHLORIDE, SODIUM LACTATE, POTASSIUM CHLORIDE, CALCIUM CHLORIDE AND DEXTROSE MONOHYDRATE: 5; 600; 310; 30; 20 INJECTION, SOLUTION INTRAVENOUS at 12:15

## 2023-12-10 RX ADMIN — ROCURONIUM BROMIDE 20 MG: 10 INJECTION, SOLUTION INTRAVENOUS at 07:30

## 2023-12-10 RX ADMIN — IOPAMIDOL 100 ML: 755 INJECTION, SOLUTION INTRAVENOUS at 00:55

## 2023-12-10 RX ADMIN — SODIUM CHLORIDE 1000 ML: 9 INJECTION, SOLUTION INTRAVENOUS at 00:11

## 2023-12-10 RX ADMIN — FENTANYL CITRATE 100 MCG: 50 INJECTION, SOLUTION INTRAMUSCULAR; INTRAVENOUS at 01:32

## 2023-12-10 RX ADMIN — ROCURONIUM BROMIDE 40 MG: 10 INJECTION, SOLUTION INTRAVENOUS at 06:30

## 2023-12-10 RX ADMIN — ACETAMINOPHEN 650 MG: 325 TABLET ORAL at 18:00

## 2023-12-10 RX ADMIN — CEFEPIME 2000 MG: 2 INJECTION, POWDER, FOR SOLUTION INTRAVENOUS at 12:16

## 2023-12-10 RX ADMIN — NALOXEGOL OXALATE 25 MG: 25 TABLET, FILM COATED ORAL at 12:58

## 2023-12-10 RX ADMIN — FENTANYL CITRATE 100 MCG: 50 INJECTION, SOLUTION INTRAMUSCULAR; INTRAVENOUS at 03:08

## 2023-12-10 RX ADMIN — SODIUM CHLORIDE: 9 INJECTION, SOLUTION INTRAVENOUS at 10:36

## 2023-12-10 RX ADMIN — MIDAZOLAM HYDROCHLORIDE 2 MG: 1 INJECTION, SOLUTION INTRAMUSCULAR; INTRAVENOUS at 06:15

## 2023-12-10 RX ADMIN — HYDROMORPHONE HYDROCHLORIDE 1 MG: 1 INJECTION, SOLUTION INTRAMUSCULAR; INTRAVENOUS; SUBCUTANEOUS at 04:33

## 2023-12-10 RX ADMIN — DROPERIDOL 2.5 MG: 2.5 INJECTION, SOLUTION INTRAMUSCULAR; INTRAVENOUS at 00:11

## 2023-12-10 RX ADMIN — LIDOCAINE HYDROCHLORIDE 40 MG: 20 INJECTION, SOLUTION EPIDURAL; INFILTRATION; INTRACAUDAL; PERINEURAL at 06:21

## 2023-12-10 RX ADMIN — SODIUM CHLORIDE, PRESERVATIVE FREE 10 ML: 5 INJECTION INTRAVENOUS at 20:09

## 2023-12-10 RX ADMIN — ACETAMINOPHEN 650 MG: 325 TABLET ORAL at 12:58

## 2023-12-10 RX ADMIN — CEFAZOLIN 2 G: 1 INJECTION, POWDER, FOR SOLUTION INTRAMUSCULAR; INTRAVENOUS at 06:30

## 2023-12-10 RX ADMIN — SODIUM CHLORIDE, POTASSIUM CHLORIDE, SODIUM LACTATE AND CALCIUM CHLORIDE: 600; 310; 30; 20 INJECTION, SOLUTION INTRAVENOUS at 07:00

## 2023-12-10 RX ADMIN — KETOROLAC TROMETHAMINE 30 MG: 30 INJECTION, SOLUTION INTRAMUSCULAR; INTRAVENOUS at 18:00

## 2023-12-10 RX ADMIN — ROCURONIUM BROMIDE 30 MG: 10 INJECTION, SOLUTION INTRAVENOUS at 06:55

## 2023-12-10 RX ADMIN — ONDANSETRON HYDROCHLORIDE 4 MG: 2 SOLUTION INTRAMUSCULAR; INTRAVENOUS at 06:10

## 2023-12-10 RX ADMIN — FENTANYL CITRATE 100 MCG: 50 INJECTION, SOLUTION INTRAMUSCULAR; INTRAVENOUS at 06:15

## 2023-12-10 RX ADMIN — SODIUM CHLORIDE 1000 ML: 9 INJECTION, SOLUTION INTRAVENOUS at 01:41

## 2023-12-10 RX ADMIN — DEXAMETHASONE SODIUM PHOSPHATE 4 MG: 4 INJECTION, SOLUTION INTRAMUSCULAR; INTRAVENOUS at 06:27

## 2023-12-10 RX ADMIN — CEFEPIME 2000 MG: 2 INJECTION, POWDER, FOR SOLUTION INTRAVENOUS at 20:09

## 2023-12-10 RX ADMIN — KETOROLAC TROMETHAMINE 30 MG: 30 INJECTION, SOLUTION INTRAMUSCULAR; INTRAVENOUS at 12:58

## 2023-12-10 RX ADMIN — PROPOFOL 150 MG: 10 INJECTION, EMULSION INTRAVENOUS at 06:21

## 2023-12-10 RX ADMIN — MEROPENEM 1000 MG: 1 INJECTION, POWDER, FOR SOLUTION INTRAVENOUS at 01:36

## 2023-12-10 RX ADMIN — FAMOTIDINE 20 MG: 20 TABLET ORAL at 21:30

## 2023-12-10 RX ADMIN — HYDROMORPHONE HYDROCHLORIDE 0.5 MG: 1 INJECTION, SOLUTION INTRAMUSCULAR; INTRAVENOUS; SUBCUTANEOUS at 21:31

## 2023-12-10 RX ADMIN — SUGAMMADEX 200 MG: 100 INJECTION, SOLUTION INTRAVENOUS at 08:41

## 2023-12-10 RX ADMIN — HYDROMORPHONE HYDROCHLORIDE 0.25 MG: 1 INJECTION, SOLUTION INTRAMUSCULAR; INTRAVENOUS; SUBCUTANEOUS at 10:02

## 2023-12-10 RX ADMIN — SODIUM CHLORIDE, POTASSIUM CHLORIDE, SODIUM LACTATE AND CALCIUM CHLORIDE: 600; 310; 30; 20 INJECTION, SOLUTION INTRAVENOUS at 06:07

## 2023-12-10 ASSESSMENT — PAIN DESCRIPTION - LOCATION
LOCATION: ABDOMEN
LOCATION: BACK
LOCATION: ABDOMEN

## 2023-12-10 ASSESSMENT — PAIN SCALES - GENERAL
PAINLEVEL_OUTOF10: 9
PAINLEVEL_OUTOF10: 5
PAINLEVEL_OUTOF10: 4
PAINLEVEL_OUTOF10: 4
PAINLEVEL_OUTOF10: 7
PAINLEVEL_OUTOF10: 7
PAINLEVEL_OUTOF10: 2
PAINLEVEL_OUTOF10: 2
PAINLEVEL_OUTOF10: 6

## 2023-12-10 ASSESSMENT — PAIN DESCRIPTION - PAIN TYPE: TYPE: ACUTE PAIN

## 2023-12-10 ASSESSMENT — PAIN DESCRIPTION - DESCRIPTORS
DESCRIPTORS: ACHING;CRAMPING
DESCRIPTORS: ACHING

## 2023-12-10 ASSESSMENT — PAIN DESCRIPTION - ORIENTATION
ORIENTATION: LOWER
ORIENTATION: MID

## 2023-12-10 NOTE — ED NOTES
Message left with General Surgery answering service to determine plan of care      Radha Pina RN  12/10/23 [id number]

## 2023-12-10 NOTE — BRIEF OP NOTE
Brief Postoperative Note      Patient: Marisel Jenkins  YOB: 1970  MRN: [id number]    Date of Procedure: 12/10/2023    Pre-Op Diagnosis Codes:     * Free intraperitoneal air [K66.8]    Post-Op Diagnosis: perforated diverticulitis    Procedure(s):  LAPAROTOMY EXPLORATORY, sigmoid colectomy    Surgeon(s):  Bobby Iglesias MD    Assistant:  Surgical Assistant: Maria Dolores Morales    Anesthesia: General    Estimated Blood Loss (mL): less than 896     Complications: None    Specimens:   ID Type Source Tests Collected by Time Destination   1 : SIGMOID COLON Tissue Sigmoid Colon SURGICAL PATHOLOGY Bobby Iglesias MD 12/10/2023 0719        Implants:  * No implants in log *      Drains:   Urinary Catheter 12/10/23 2 Way (Active)       Findings: severe inflammation of colon, walled off by small bowel which was secondarily inflamed      Electronically signed by Rianna Ramirez MD on 12/10/2023 at 8:37 AM 0

## 2023-12-10 NOTE — PERIOP NOTE
1050 Received call from nursing supervisor regarding patient's heart rate >100. Supervisor states patient cannot go to 4th floor with HR >100. Dr Obie Maddox contacted. Order received for intermediate care transfer. 1100 Patient evaluated by Rapid Response RN per supervisor's request. Pt ok to go to floor with remote tele.

## 2023-12-10 NOTE — ED TRIAGE NOTES
Patient ambulatory to Triage with c/o abdominal pain and constipation. Patient reports that she has not had a normal bowel movement for the past two weeks. Patient states that she has taken multiple dulcolax with no relief of symptoms.

## 2023-12-10 NOTE — ED NOTES
Dr Danielle Villalpando returned call, will call nursing supervisor in about an hour to discuss posting case, received 1 time order for 1 mg dilaudid IV for pain if needed      Abilio Sharif, RN  12/10/23 2240

## 2023-12-10 NOTE — ED PROVIDER NOTES
moist.      Pharynx: Oropharynx is clear. No oropharyngeal exudate or posterior oropharyngeal erythema. Eyes:      General: No scleral icterus. Extraocular Movements: Extraocular movements intact. Conjunctiva/sclera: Conjunctivae normal.      Pupils: Pupils are equal, round, and reactive to light. Cardiovascular:      Rate and Rhythm: Normal rate and regular rhythm. Pulses: Normal pulses. Heart sounds: No murmur heard. No friction rub. Pulmonary:      Effort: Pulmonary effort is normal. No respiratory distress. Breath sounds: Normal breath sounds. No stridor. No wheezing, rhonchi or rales. Abdominal:      General: Abdomen is flat. There is no distension. Palpations: Abdomen is soft. Tenderness: There is generalized abdominal tenderness. There is guarding and rebound. Musculoskeletal:         General: No swelling. Cervical back: Normal range of motion. No rigidity. Right lower leg: No edema. Left lower leg: No edema. Skin:     General: Skin is warm and dry. Capillary Refill: Capillary refill takes less than 2 seconds. Coloration: Skin is not jaundiced. Neurological:      General: No focal deficit present. Mental Status: She is alert. Cranial Nerves: No cranial nerve deficit. Sensory: No sensory deficit. Motor: No weakness. Coordination: Coordination normal.         DIAGNOSTIC RESULTS     EKG: All EKG's are interpreted by the Emergency Department Physician who either signs or Co-signs this chart in the absence of a cardiologist.    EKG Interpretation  ST, narrow QRS, nl intervals, no BERNARDINO/STD/TWI    RADIOLOGY:   Interpretation per the Radiologist below, if available at the time of this note:    CT ABDOMEN PELVIS W IV CONTRAST Additional Contrast? None   Final Result   Focal wall thickening mid sigmoid colon with associated inflammation may be   related to sigmoid diverticulitis.  Diffuse small bowel wall edema,

## 2023-12-11 LAB
ANION GAP SERPL CALC-SCNC: 6 MMOL/L (ref 5–15)
BUN SERPL-MCNC: 17 MG/DL (ref 6–20)
BUN/CREAT SERPL: 24 (ref 12–20)
CALCIUM SERPL-MCNC: 8 MG/DL (ref 8.5–10.1)
CHLORIDE SERPL-SCNC: 106 MMOL/L (ref 97–108)
CO2 SERPL-SCNC: 24 MMOL/L (ref 21–32)
CREAT SERPL-MCNC: 0.7 MG/DL (ref 0.55–1.02)
ERYTHROCYTE [DISTWIDTH] IN BLOOD BY AUTOMATED COUNT: 13.7 % (ref 11.5–14.5)
GLUCOSE SERPL-MCNC: 132 MG/DL (ref 65–100)
HCT VFR BLD AUTO: 34.9 % (ref 35–47)
HGB BLD-MCNC: 11 G/DL (ref 11.5–16)
MCH RBC QN AUTO: 26.5 PG (ref 26–34)
MCHC RBC AUTO-ENTMCNC: 31.5 G/DL (ref 30–36.5)
MCV RBC AUTO: 84.1 FL (ref 80–99)
NRBC # BLD: 0 K/UL (ref 0–0.01)
NRBC BLD-RTO: 0 PER 100 WBC
PLATELET # BLD AUTO: 285 K/UL (ref 150–400)
PMV BLD AUTO: 10.8 FL (ref 8.9–12.9)
POTASSIUM SERPL-SCNC: 3.9 MMOL/L (ref 3.5–5.1)
RBC # BLD AUTO: 4.15 M/UL (ref 3.8–5.2)
SODIUM SERPL-SCNC: 136 MMOL/L (ref 136–145)
WBC # BLD AUTO: 9 K/UL (ref 3.6–11)

## 2023-12-11 PROCEDURE — 85027 COMPLETE CBC AUTOMATED: CPT

## 2023-12-11 PROCEDURE — 2580000003 HC RX 258: Performed by: SURGERY

## 2023-12-11 PROCEDURE — 6370000000 HC RX 637 (ALT 250 FOR IP): Performed by: SURGERY

## 2023-12-11 PROCEDURE — 6360000002 HC RX W HCPCS: Performed by: SURGERY

## 2023-12-11 PROCEDURE — 80048 BASIC METABOLIC PNL TOTAL CA: CPT

## 2023-12-11 PROCEDURE — 36415 COLL VENOUS BLD VENIPUNCTURE: CPT

## 2023-12-11 PROCEDURE — APPNB30 APP NON BILLABLE TIME 0-30 MINS: Performed by: NURSE PRACTITIONER

## 2023-12-11 PROCEDURE — 1100000000 HC RM PRIVATE

## 2023-12-11 RX ORDER — OXYCODONE HYDROCHLORIDE 5 MG/1
10 TABLET ORAL EVERY 4 HOURS PRN
Status: DISCONTINUED | OUTPATIENT
Start: 2023-12-11 | End: 2023-12-14 | Stop reason: HOSPADM

## 2023-12-11 RX ORDER — OXYCODONE HYDROCHLORIDE 5 MG/1
5 TABLET ORAL EVERY 4 HOURS PRN
Status: DISCONTINUED | OUTPATIENT
Start: 2023-12-11 | End: 2023-12-14 | Stop reason: HOSPADM

## 2023-12-11 RX ADMIN — ENOXAPARIN SODIUM 30 MG: 100 INJECTION SUBCUTANEOUS at 08:09

## 2023-12-11 RX ADMIN — HYDROMORPHONE HYDROCHLORIDE 0.5 MG: 1 INJECTION, SOLUTION INTRAMUSCULAR; INTRAVENOUS; SUBCUTANEOUS at 02:16

## 2023-12-11 RX ADMIN — KETOROLAC TROMETHAMINE 30 MG: 30 INJECTION, SOLUTION INTRAMUSCULAR; INTRAVENOUS at 17:21

## 2023-12-11 RX ADMIN — METRONIDAZOLE 500 MG: 500 INJECTION, SOLUTION INTRAVENOUS at 02:20

## 2023-12-11 RX ADMIN — METRONIDAZOLE 500 MG: 500 INJECTION, SOLUTION INTRAVENOUS at 17:21

## 2023-12-11 RX ADMIN — CEFEPIME 2000 MG: 2 INJECTION, POWDER, FOR SOLUTION INTRAVENOUS at 20:43

## 2023-12-11 RX ADMIN — KETOROLAC TROMETHAMINE 30 MG: 30 INJECTION, SOLUTION INTRAMUSCULAR; INTRAVENOUS at 00:06

## 2023-12-11 RX ADMIN — ACETAMINOPHEN 650 MG: 325 TABLET ORAL at 00:06

## 2023-12-11 RX ADMIN — FAMOTIDINE 20 MG: 20 TABLET ORAL at 08:08

## 2023-12-11 RX ADMIN — FAMOTIDINE 20 MG: 20 TABLET ORAL at 20:34

## 2023-12-11 RX ADMIN — CEFEPIME 2000 MG: 2 INJECTION, POWDER, FOR SOLUTION INTRAVENOUS at 11:52

## 2023-12-11 RX ADMIN — CEFEPIME 2000 MG: 2 INJECTION, POWDER, FOR SOLUTION INTRAVENOUS at 03:40

## 2023-12-11 RX ADMIN — SODIUM CHLORIDE, PRESERVATIVE FREE 10 ML: 5 INJECTION INTRAVENOUS at 08:09

## 2023-12-11 RX ADMIN — ONDANSETRON 4 MG: 2 INJECTION INTRAMUSCULAR; INTRAVENOUS at 20:33

## 2023-12-11 RX ADMIN — ACETAMINOPHEN 650 MG: 325 TABLET ORAL at 17:21

## 2023-12-11 RX ADMIN — ENOXAPARIN SODIUM 30 MG: 100 INJECTION SUBCUTANEOUS at 20:33

## 2023-12-11 RX ADMIN — NALOXEGOL OXALATE 25 MG: 25 TABLET, FILM COATED ORAL at 08:08

## 2023-12-11 RX ADMIN — KETOROLAC TROMETHAMINE 30 MG: 30 INJECTION, SOLUTION INTRAMUSCULAR; INTRAVENOUS at 06:00

## 2023-12-11 RX ADMIN — SODIUM CHLORIDE, PRESERVATIVE FREE 10 ML: 5 INJECTION INTRAVENOUS at 20:34

## 2023-12-11 RX ADMIN — KETOROLAC TROMETHAMINE 30 MG: 30 INJECTION, SOLUTION INTRAMUSCULAR; INTRAVENOUS at 11:51

## 2023-12-11 RX ADMIN — METRONIDAZOLE 500 MG: 500 INJECTION, SOLUTION INTRAVENOUS at 10:03

## 2023-12-11 RX ADMIN — SODIUM CHLORIDE, SODIUM LACTATE, POTASSIUM CHLORIDE, CALCIUM CHLORIDE AND DEXTROSE MONOHYDRATE: 5; 600; 310; 30; 20 INJECTION, SOLUTION INTRAVENOUS at 20:27

## 2023-12-11 RX ADMIN — ACETAMINOPHEN 650 MG: 325 TABLET ORAL at 11:51

## 2023-12-11 RX ADMIN — ACETAMINOPHEN 650 MG: 325 TABLET ORAL at 06:00

## 2023-12-11 ASSESSMENT — PAIN DESCRIPTION - PAIN TYPE
TYPE: ACUTE PAIN;SURGICAL PAIN
TYPE: ACUTE PAIN;SURGICAL PAIN
TYPE: ACUTE PAIN
TYPE: ACUTE PAIN;SURGICAL PAIN

## 2023-12-11 ASSESSMENT — PAIN DESCRIPTION - LOCATION
LOCATION: ABDOMEN

## 2023-12-11 ASSESSMENT — PAIN SCALES - GENERAL
PAINLEVEL_OUTOF10: 3
PAINLEVEL_OUTOF10: 2
PAINLEVEL_OUTOF10: 3
PAINLEVEL_OUTOF10: 4

## 2023-12-11 ASSESSMENT — PAIN DESCRIPTION - DESCRIPTORS
DESCRIPTORS: DULL;ACHING
DESCRIPTORS: ACHING;DULL
DESCRIPTORS: DULL;ACHING
DESCRIPTORS: DULL;ACHING

## 2023-12-11 NOTE — CARE COORDINATION
12/11/2023  9:24 AM      ICD-10-CM    1. Perforated viscus  R19.8       2. Pneumoperitoneum  K66.8       3. Acute intestinal ischemia ([address])  K55.059       4. Acute diverticulitis  K57.92             General Risk Score: 2   Values used to calculate this score:    Points  Metrics       0        Age: 47       1        Hospital Admissions: 1       1        ED Visits: 1       0        Has Chronic Obstructive Pulmonary Disease: No       0        Has Diabetes: No       0        Has Congestive Heart Failure: No       0        Has Liver Disease: No       0        Has Depression: No       0        Current PCP: Marivel Sky       0        Has Medicaid: No      CM reviewed EMR. No CM needs indicated at this time. Providers, if needs arise, please consult case management.

## 2023-12-12 LAB
ANION GAP SERPL CALC-SCNC: 5 MMOL/L (ref 5–15)
BASOPHILS # BLD: 0 K/UL (ref 0–0.1)
BASOPHILS NFR BLD: 0 % (ref 0–1)
BUN SERPL-MCNC: 19 MG/DL (ref 6–20)
BUN/CREAT SERPL: 32 (ref 12–20)
CALCIUM SERPL-MCNC: 8.1 MG/DL (ref 8.5–10.1)
CHLORIDE SERPL-SCNC: 106 MMOL/L (ref 97–108)
CO2 SERPL-SCNC: 25 MMOL/L (ref 21–32)
CREAT SERPL-MCNC: 0.59 MG/DL (ref 0.55–1.02)
DIFFERENTIAL METHOD BLD: ABNORMAL
EOSINOPHIL # BLD: 0 K/UL (ref 0–0.4)
EOSINOPHIL NFR BLD: 0 % (ref 0–7)
ERYTHROCYTE [DISTWIDTH] IN BLOOD BY AUTOMATED COUNT: 13.8 % (ref 11.5–14.5)
GLUCOSE SERPL-MCNC: 95 MG/DL (ref 65–100)
HCT VFR BLD AUTO: 29.5 % (ref 35–47)
HGB BLD-MCNC: 9.4 G/DL (ref 11.5–16)
IMM GRANULOCYTES # BLD AUTO: 0 K/UL
IMM GRANULOCYTES NFR BLD AUTO: 0 %
LYMPHOCYTES # BLD: 0.9 K/UL (ref 0.8–3.5)
LYMPHOCYTES NFR BLD: 9 % (ref 12–49)
MCH RBC QN AUTO: 26.8 PG (ref 26–34)
MCHC RBC AUTO-ENTMCNC: 31.9 G/DL (ref 30–36.5)
MCV RBC AUTO: 84 FL (ref 80–99)
MONOCYTES # BLD: 0.4 K/UL (ref 0–1)
MONOCYTES NFR BLD: 4 % (ref 5–13)
NEUTS BAND NFR BLD MANUAL: 17 % (ref 0–6)
NEUTS SEG # BLD: 8.3 K/UL (ref 1.8–8)
NEUTS SEG NFR BLD: 70 % (ref 32–75)
NRBC # BLD: 0 K/UL (ref 0–0.01)
NRBC BLD-RTO: 0 PER 100 WBC
PLATELET # BLD AUTO: 263 K/UL (ref 150–400)
PMV BLD AUTO: 10.4 FL (ref 8.9–12.9)
POTASSIUM SERPL-SCNC: 3.5 MMOL/L (ref 3.5–5.1)
RBC # BLD AUTO: 3.51 M/UL (ref 3.8–5.2)
RBC MORPH BLD: ABNORMAL
SODIUM SERPL-SCNC: 136 MMOL/L (ref 136–145)
WBC # BLD AUTO: 9.6 K/UL (ref 3.6–11)

## 2023-12-12 PROCEDURE — 97535 SELF CARE MNGMENT TRAINING: CPT

## 2023-12-12 PROCEDURE — 97165 OT EVAL LOW COMPLEX 30 MIN: CPT

## 2023-12-12 PROCEDURE — 6360000002 HC RX W HCPCS: Performed by: SURGERY

## 2023-12-12 PROCEDURE — 97530 THERAPEUTIC ACTIVITIES: CPT

## 2023-12-12 PROCEDURE — 80048 BASIC METABOLIC PNL TOTAL CA: CPT

## 2023-12-12 PROCEDURE — 6370000000 HC RX 637 (ALT 250 FOR IP): Performed by: NURSE PRACTITIONER

## 2023-12-12 PROCEDURE — 1100000000 HC RM PRIVATE

## 2023-12-12 PROCEDURE — 6370000000 HC RX 637 (ALT 250 FOR IP): Performed by: SURGERY

## 2023-12-12 PROCEDURE — 97116 GAIT TRAINING THERAPY: CPT

## 2023-12-12 PROCEDURE — APPNB15 APP NON BILLABLE TIME 0-15 MINS: Performed by: NURSE PRACTITIONER

## 2023-12-12 PROCEDURE — 2580000003 HC RX 258: Performed by: SURGERY

## 2023-12-12 PROCEDURE — 94761 N-INVAS EAR/PLS OXIMETRY MLT: CPT

## 2023-12-12 PROCEDURE — 85025 COMPLETE CBC W/AUTO DIFF WBC: CPT

## 2023-12-12 PROCEDURE — 36415 COLL VENOUS BLD VENIPUNCTURE: CPT

## 2023-12-12 PROCEDURE — 97161 PT EVAL LOW COMPLEX 20 MIN: CPT

## 2023-12-12 RX ORDER — METRONIDAZOLE 250 MG/1
500 TABLET ORAL EVERY 8 HOURS SCHEDULED
Status: DISCONTINUED | OUTPATIENT
Start: 2023-12-12 | End: 2023-12-14 | Stop reason: HOSPADM

## 2023-12-12 RX ADMIN — FAMOTIDINE 20 MG: 20 TABLET ORAL at 09:30

## 2023-12-12 RX ADMIN — OXYCODONE HYDROCHLORIDE 10 MG: 5 TABLET ORAL at 13:57

## 2023-12-12 RX ADMIN — ONDANSETRON 4 MG: 2 INJECTION INTRAMUSCULAR; INTRAVENOUS at 18:48

## 2023-12-12 RX ADMIN — ACETAMINOPHEN 650 MG: 325 TABLET ORAL at 12:54

## 2023-12-12 RX ADMIN — ACETAMINOPHEN 650 MG: 325 TABLET ORAL at 21:31

## 2023-12-12 RX ADMIN — SODIUM CHLORIDE, PRESERVATIVE FREE 10 ML: 5 INJECTION INTRAVENOUS at 09:29

## 2023-12-12 RX ADMIN — KETOROLAC TROMETHAMINE 30 MG: 30 INJECTION, SOLUTION INTRAMUSCULAR; INTRAVENOUS at 18:49

## 2023-12-12 RX ADMIN — KETOROLAC TROMETHAMINE 30 MG: 30 INJECTION, SOLUTION INTRAMUSCULAR; INTRAVENOUS at 06:35

## 2023-12-12 RX ADMIN — KETOROLAC TROMETHAMINE 30 MG: 30 INJECTION, SOLUTION INTRAMUSCULAR; INTRAVENOUS at 00:12

## 2023-12-12 RX ADMIN — ACETAMINOPHEN 650 MG: 325 TABLET ORAL at 02:00

## 2023-12-12 RX ADMIN — ONDANSETRON 4 MG: 2 INJECTION INTRAMUSCULAR; INTRAVENOUS at 09:30

## 2023-12-12 RX ADMIN — SODIUM CHLORIDE, SODIUM LACTATE, POTASSIUM CHLORIDE, CALCIUM CHLORIDE AND DEXTROSE MONOHYDRATE: 5; 600; 310; 30; 20 INJECTION, SOLUTION INTRAVENOUS at 08:15

## 2023-12-12 RX ADMIN — FAMOTIDINE 20 MG: 20 TABLET ORAL at 21:30

## 2023-12-12 RX ADMIN — ENOXAPARIN SODIUM 30 MG: 100 INJECTION SUBCUTANEOUS at 21:31

## 2023-12-12 RX ADMIN — ACETAMINOPHEN 650 MG: 325 TABLET ORAL at 09:38

## 2023-12-12 RX ADMIN — ENOXAPARIN SODIUM 30 MG: 100 INJECTION SUBCUTANEOUS at 09:30

## 2023-12-12 RX ADMIN — CEFEPIME 2000 MG: 2 INJECTION, POWDER, FOR SOLUTION INTRAVENOUS at 12:50

## 2023-12-12 RX ADMIN — METRONIDAZOLE 500 MG: 250 TABLET ORAL at 18:49

## 2023-12-12 RX ADMIN — OXYCODONE HYDROCHLORIDE 5 MG: 5 TABLET ORAL at 18:48

## 2023-12-12 RX ADMIN — CEFEPIME 2000 MG: 2 INJECTION, POWDER, FOR SOLUTION INTRAVENOUS at 06:36

## 2023-12-12 RX ADMIN — CEFEPIME 2000 MG: 2 INJECTION, POWDER, FOR SOLUTION INTRAVENOUS at 21:28

## 2023-12-12 RX ADMIN — METRONIDAZOLE 500 MG: 500 INJECTION, SOLUTION INTRAVENOUS at 10:03

## 2023-12-12 RX ADMIN — METRONIDAZOLE 500 MG: 500 INJECTION, SOLUTION INTRAVENOUS at 03:19

## 2023-12-12 RX ADMIN — NALOXEGOL OXALATE 25 MG: 25 TABLET, FILM COATED ORAL at 09:30

## 2023-12-12 RX ADMIN — KETOROLAC TROMETHAMINE 30 MG: 30 INJECTION, SOLUTION INTRAMUSCULAR; INTRAVENOUS at 12:49

## 2023-12-12 ASSESSMENT — PAIN SCALES - GENERAL
PAINLEVEL_OUTOF10: 4
PAINLEVEL_OUTOF10: 5
PAINLEVEL_OUTOF10: 7
PAINLEVEL_OUTOF10: 3
PAINLEVEL_OUTOF10: 3

## 2023-12-12 ASSESSMENT — PAIN DESCRIPTION - LOCATION
LOCATION: ABDOMEN
LOCATION: HEAD
LOCATION: ABDOMEN
LOCATION: ABDOMEN

## 2023-12-12 ASSESSMENT — PAIN DESCRIPTION - DESCRIPTORS: DESCRIPTORS: ACHING;DISCOMFORT;CRAMPING

## 2023-12-12 ASSESSMENT — PAIN DESCRIPTION - ORIENTATION
ORIENTATION: ANTERIOR
ORIENTATION: ANTERIOR

## 2023-12-12 NOTE — OP NOTE
again the irrigant was aspirated as fully as possible. Gowns and gloves were changed. Then a 15-Dominican fully perforated drain was placed within the pelvis and was brought out through a right lateral incision where it was secured to the skin with a 2-0 nylon suture. The fascia was closed with two running 0 PDS sutures. The skin was closed with staples. The incision was cleaned and dried and the REMY dressing was applied. The patient was awakened in the operating room, was extubated, and was taken to the recovery room in stable condition. All counts were reported correct prior to the patient leaving the operating room.         MD GABBI [id number]  D:  12/12/2023 7:40  T:  12/12/2023 9:31  JOB #:  [id number]

## 2023-12-13 PROCEDURE — 6360000002 HC RX W HCPCS: Performed by: SURGERY

## 2023-12-13 PROCEDURE — 97535 SELF CARE MNGMENT TRAINING: CPT

## 2023-12-13 PROCEDURE — APPNB30 APP NON BILLABLE TIME 0-30 MINS: Performed by: NURSE PRACTITIONER

## 2023-12-13 PROCEDURE — 94761 N-INVAS EAR/PLS OXIMETRY MLT: CPT

## 2023-12-13 PROCEDURE — 2580000003 HC RX 258: Performed by: SURGERY

## 2023-12-13 PROCEDURE — 6370000000 HC RX 637 (ALT 250 FOR IP): Performed by: NURSE PRACTITIONER

## 2023-12-13 PROCEDURE — 97530 THERAPEUTIC ACTIVITIES: CPT

## 2023-12-13 PROCEDURE — 6370000000 HC RX 637 (ALT 250 FOR IP): Performed by: SURGERY

## 2023-12-13 PROCEDURE — 97116 GAIT TRAINING THERAPY: CPT

## 2023-12-13 PROCEDURE — 1100000000 HC RM PRIVATE

## 2023-12-13 RX ORDER — DOCUSATE SODIUM 100 MG/1
100 CAPSULE, LIQUID FILLED ORAL DAILY
Status: DISCONTINUED | OUTPATIENT
Start: 2023-12-13 | End: 2023-12-14

## 2023-12-13 RX ADMIN — DOCUSATE SODIUM 100 MG: 100 CAPSULE, LIQUID FILLED ORAL at 10:47

## 2023-12-13 RX ADMIN — METRONIDAZOLE 500 MG: 250 TABLET ORAL at 14:50

## 2023-12-13 RX ADMIN — CEFEPIME 2000 MG: 2 INJECTION, POWDER, FOR SOLUTION INTRAVENOUS at 20:53

## 2023-12-13 RX ADMIN — ACETAMINOPHEN 650 MG: 325 TABLET ORAL at 08:04

## 2023-12-13 RX ADMIN — NALOXEGOL OXALATE 25 MG: 25 TABLET, FILM COATED ORAL at 08:04

## 2023-12-13 RX ADMIN — SODIUM CHLORIDE, PRESERVATIVE FREE 10 ML: 5 INJECTION INTRAVENOUS at 20:46

## 2023-12-13 RX ADMIN — CEFEPIME 2000 MG: 2 INJECTION, POWDER, FOR SOLUTION INTRAVENOUS at 06:04

## 2023-12-13 RX ADMIN — KETOROLAC TROMETHAMINE 30 MG: 30 INJECTION, SOLUTION INTRAMUSCULAR; INTRAVENOUS at 02:14

## 2023-12-13 RX ADMIN — ACETAMINOPHEN 650 MG: 325 TABLET ORAL at 14:50

## 2023-12-13 RX ADMIN — ACETAMINOPHEN 650 MG: 325 TABLET ORAL at 20:45

## 2023-12-13 RX ADMIN — ONDANSETRON 4 MG: 4 TABLET, ORALLY DISINTEGRATING ORAL at 16:55

## 2023-12-13 RX ADMIN — ENOXAPARIN SODIUM 30 MG: 100 INJECTION SUBCUTANEOUS at 20:46

## 2023-12-13 RX ADMIN — CEFEPIME 2000 MG: 2 INJECTION, POWDER, FOR SOLUTION INTRAVENOUS at 11:57

## 2023-12-13 RX ADMIN — ENOXAPARIN SODIUM 30 MG: 100 INJECTION SUBCUTANEOUS at 08:55

## 2023-12-13 RX ADMIN — METRONIDAZOLE 500 MG: 250 TABLET ORAL at 20:46

## 2023-12-13 RX ADMIN — SODIUM CHLORIDE, PRESERVATIVE FREE 10 ML: 5 INJECTION INTRAVENOUS at 02:15

## 2023-12-13 RX ADMIN — SODIUM CHLORIDE, SODIUM LACTATE, POTASSIUM CHLORIDE, CALCIUM CHLORIDE AND DEXTROSE MONOHYDRATE: 5; 600; 310; 30; 20 INJECTION, SOLUTION INTRAVENOUS at 08:55

## 2023-12-13 RX ADMIN — KETOROLAC TROMETHAMINE 30 MG: 30 INJECTION, SOLUTION INTRAMUSCULAR; INTRAVENOUS at 08:04

## 2023-12-13 RX ADMIN — ACETAMINOPHEN 650 MG: 325 TABLET ORAL at 02:14

## 2023-12-13 RX ADMIN — FAMOTIDINE 20 MG: 20 TABLET ORAL at 08:04

## 2023-12-13 RX ADMIN — OXYCODONE HYDROCHLORIDE 10 MG: 5 TABLET ORAL at 22:54

## 2023-12-13 RX ADMIN — ONDANSETRON 4 MG: 2 INJECTION INTRAMUSCULAR; INTRAVENOUS at 08:05

## 2023-12-13 RX ADMIN — OXYCODONE HYDROCHLORIDE 10 MG: 5 TABLET ORAL at 12:00

## 2023-12-13 RX ADMIN — SODIUM CHLORIDE, PRESERVATIVE FREE 10 ML: 5 INJECTION INTRAVENOUS at 08:05

## 2023-12-13 RX ADMIN — FAMOTIDINE 20 MG: 20 TABLET ORAL at 20:46

## 2023-12-13 RX ADMIN — METRONIDAZOLE 500 MG: 250 TABLET ORAL at 06:01

## 2023-12-13 ASSESSMENT — PAIN DESCRIPTION - FREQUENCY
FREQUENCY: CONTINUOUS
FREQUENCY: CONTINUOUS

## 2023-12-13 ASSESSMENT — PAIN DESCRIPTION - LOCATION
LOCATION: ABDOMEN;INCISION
LOCATION: ABDOMEN

## 2023-12-13 ASSESSMENT — PAIN SCALES - GENERAL
PAINLEVEL_OUTOF10: 0
PAINLEVEL_OUTOF10: 3
PAINLEVEL_OUTOF10: 3
PAINLEVEL_OUTOF10: 7
PAINLEVEL_OUTOF10: 3
PAINLEVEL_OUTOF10: 8
PAINLEVEL_OUTOF10: 3
PAINLEVEL_OUTOF10: 3
PAINLEVEL_OUTOF10: 7
PAINLEVEL_OUTOF10: 5

## 2023-12-13 ASSESSMENT — PAIN - FUNCTIONAL ASSESSMENT: PAIN_FUNCTIONAL_ASSESSMENT: ACTIVITIES ARE NOT PREVENTED

## 2023-12-13 ASSESSMENT — PAIN DESCRIPTION - ONSET
ONSET: ON-GOING
ONSET: ON-GOING

## 2023-12-13 ASSESSMENT — PAIN DESCRIPTION - DESCRIPTORS
DESCRIPTORS: ACHING
DESCRIPTORS: ACHING
DESCRIPTORS: SORE
DESCRIPTORS: STABBING

## 2023-12-13 ASSESSMENT — PAIN DESCRIPTION - ORIENTATION
ORIENTATION: ANTERIOR

## 2023-12-13 ASSESSMENT — PAIN DESCRIPTION - PAIN TYPE
TYPE: ACUTE PAIN
TYPE: ACUTE PAIN

## 2023-12-13 ASSESSMENT — PAIN SCALES - WONG BAKER: WONGBAKER_NUMERICALRESPONSE: 2

## 2023-12-14 VITALS
HEART RATE: 83 BPM | DIASTOLIC BLOOD PRESSURE: 71 MMHG | RESPIRATION RATE: 16 BRPM | OXYGEN SATURATION: 95 % | BODY MASS INDEX: 40.66 KG/M2 | SYSTOLIC BLOOD PRESSURE: 122 MMHG | HEIGHT: 66 IN | WEIGHT: 253 LBS | TEMPERATURE: 99.1 F

## 2023-12-14 PROCEDURE — 6360000002 HC RX W HCPCS: Performed by: SURGERY

## 2023-12-14 PROCEDURE — APPNB15 APP NON BILLABLE TIME 0-15 MINS: Performed by: NURSE PRACTITIONER

## 2023-12-14 PROCEDURE — 6370000000 HC RX 637 (ALT 250 FOR IP): Performed by: NURSE PRACTITIONER

## 2023-12-14 PROCEDURE — 2580000003 HC RX 258: Performed by: SURGERY

## 2023-12-14 PROCEDURE — 6370000000 HC RX 637 (ALT 250 FOR IP): Performed by: SURGERY

## 2023-12-14 PROCEDURE — 94761 N-INVAS EAR/PLS OXIMETRY MLT: CPT

## 2023-12-14 PROCEDURE — 97116 GAIT TRAINING THERAPY: CPT

## 2023-12-14 PROCEDURE — 97530 THERAPEUTIC ACTIVITIES: CPT

## 2023-12-14 RX ORDER — ONDANSETRON 4 MG/1
4 TABLET, ORALLY DISINTEGRATING ORAL EVERY 8 HOURS PRN
Qty: 10 TABLET | Refills: 0 | Status: ON HOLD | OUTPATIENT
Start: 2023-12-14

## 2023-12-14 RX ORDER — BISACODYL 10 MG
10 SUPPOSITORY, RECTAL RECTAL DAILY PRN
Status: DISCONTINUED | OUTPATIENT
Start: 2023-12-14 | End: 2023-12-14 | Stop reason: HOSPADM

## 2023-12-14 RX ORDER — OXYCODONE HYDROCHLORIDE 5 MG/1
5 TABLET ORAL
Qty: 12 TABLET | Refills: 0 | Status: ON HOLD | OUTPATIENT
Start: 2023-12-14 | End: 2023-12-17

## 2023-12-14 RX ORDER — LEVOFLOXACIN 750 MG/1
750 TABLET, FILM COATED ORAL DAILY
Qty: 7 TABLET | Refills: 0 | Status: ON HOLD | OUTPATIENT
Start: 2023-12-14 | End: 2023-12-21

## 2023-12-14 RX ORDER — BISACODYL 5 MG/1
5 TABLET, DELAYED RELEASE ORAL 2 TIMES DAILY
Status: DISCONTINUED | OUTPATIENT
Start: 2023-12-14 | End: 2023-12-14 | Stop reason: HOSPADM

## 2023-12-14 RX ORDER — METRONIDAZOLE 500 MG/1
500 TABLET ORAL EVERY 8 HOURS SCHEDULED
Qty: 21 TABLET | Refills: 0 | Status: ON HOLD | OUTPATIENT
Start: 2023-12-14 | End: 2023-12-21

## 2023-12-14 RX ADMIN — ACETAMINOPHEN 650 MG: 325 TABLET ORAL at 03:56

## 2023-12-14 RX ADMIN — ONDANSETRON 4 MG: 2 INJECTION INTRAMUSCULAR; INTRAVENOUS at 09:48

## 2023-12-14 RX ADMIN — NALOXEGOL OXALATE 25 MG: 25 TABLET, FILM COATED ORAL at 09:35

## 2023-12-14 RX ADMIN — DOCUSATE SODIUM 100 MG: 100 CAPSULE, LIQUID FILLED ORAL at 09:35

## 2023-12-14 RX ADMIN — ACETAMINOPHEN 650 MG: 325 TABLET ORAL at 09:35

## 2023-12-14 RX ADMIN — OXYCODONE HYDROCHLORIDE 10 MG: 5 TABLET ORAL at 12:00

## 2023-12-14 RX ADMIN — FAMOTIDINE 20 MG: 20 TABLET ORAL at 09:35

## 2023-12-14 RX ADMIN — BISACODYL 5 MG: 5 TABLET, COATED ORAL at 12:00

## 2023-12-14 RX ADMIN — ACETAMINOPHEN 650 MG: 325 TABLET ORAL at 14:55

## 2023-12-14 RX ADMIN — OXYCODONE HYDROCHLORIDE 10 MG: 5 TABLET ORAL at 03:56

## 2023-12-14 RX ADMIN — SODIUM CHLORIDE, PRESERVATIVE FREE 10 ML: 5 INJECTION INTRAVENOUS at 09:38

## 2023-12-14 RX ADMIN — CEFEPIME 2000 MG: 2 INJECTION, POWDER, FOR SOLUTION INTRAVENOUS at 04:01

## 2023-12-14 RX ADMIN — CEFEPIME 2000 MG: 2 INJECTION, POWDER, FOR SOLUTION INTRAVENOUS at 12:02

## 2023-12-14 RX ADMIN — ENOXAPARIN SODIUM 30 MG: 100 INJECTION SUBCUTANEOUS at 09:37

## 2023-12-14 RX ADMIN — METRONIDAZOLE 500 MG: 250 TABLET ORAL at 05:44

## 2023-12-14 ASSESSMENT — PAIN DESCRIPTION - DESCRIPTORS: DESCRIPTORS: STABBING

## 2023-12-14 ASSESSMENT — PAIN SCALES - WONG BAKER: WONGBAKER_NUMERICALRESPONSE: 2

## 2023-12-14 ASSESSMENT — PAIN DESCRIPTION - ORIENTATION: ORIENTATION: ANTERIOR

## 2023-12-14 ASSESSMENT — PAIN SCALES - GENERAL
PAINLEVEL_OUTOF10: 4
PAINLEVEL_OUTOF10: 8
PAINLEVEL_OUTOF10: 8
PAINLEVEL_OUTOF10: 7
PAINLEVEL_OUTOF10: 3

## 2023-12-14 ASSESSMENT — PAIN DESCRIPTION - LOCATION: LOCATION: ABDOMEN;INCISION

## 2023-12-14 NOTE — PLAN OF CARE
Problem: Occupational Therapy - Adult  Goal: By Discharge: Performs self-care activities at highest level of function for planned discharge setting. See evaluation for individualized goals. Description: FUNCTIONAL STATUS PRIOR TO ADMISSION:  Patient is active and independent at baseline. Works and drives (but hasn't driven since seizure on 9/28/2023). , ADL Assistance: Independent,  ,  ,  ,  ,  , Homemaking Assistance: Independent, Ambulation Assistance: Independent, Transfer Assistance: Independent, Active : Yes (has not driven since 0/79/9336 due to seizure)     HOME SUPPORT: Patient lived with  and teenage children (12 and 25 y.o). Occupational Therapy Goals:  Initiated 12/12/2023  1. Patient will perform grooming, standing at sink, with Modified Letcher within 7 day(s). 2.  Patient will perform lower body dressing with Modified Letcher using AE PRN within 7 day(s). 3.  Patient will perform bathing with Supervision within 7 day(s). 4.  Patient will perform toilet transfers with Modified Letcher  within 7 day(s). 5.  Patient will perform all aspects of toileting with Modified Letcher within 7 day(s). 6.  Patient will participate in upper extremity therapeutic exercise/activities with Letcher for 10 minutes within 7 day(s). Outcome: Progressing   OCCUPATIONAL THERAPY TREATMENT  Patient: Joe Silva (28 y.o. female)  Date: 12/13/2023  Primary Diagnosis: Pneumoperitoneum [K66.8]  Acute intestinal ischemia (HCC) [K55.059]  Perforated viscus [R19.8]  Acute diverticulitis [K57.92]  Abdominal pain [R10.9]  Diverticulitis large intestine w/o perforation or abscess w/bleeding [K57.33]  Procedure(s) (LRB):  LAPAROTOMY EXPLORATORY, sigmoid colectomy (N/A) 3 Days Post-Op   Precautions: Fall Risk (abdominal binder)                Chart, occupational therapy assessment, plan of care, and goals were reviewed.     ASSESSMENT  Patient continues to benefit from skilled OT
Problem: Pain  Goal: Verbalizes/displays adequate comfort level or baseline comfort level  12/13/2023 0005 by Chloe Hawkins RN  Outcome: Progressing     Problem: Safety - Adult  Goal: Free from fall injury  12/13/2023 0005 by Chloe Hawkins RN  Outcome: Progressing
Problem: Pain  Goal: Verbalizes/displays adequate comfort level or baseline comfort level  12/13/2023 1035 by Antonieta Castillo RN  Outcome: Progressing  Flowsheets (Taken 12/13/2023 0802)  Verbalizes/displays adequate comfort level or baseline comfort level:   Encourage patient to monitor pain and request assistance   Assess pain using appropriate pain scale   Administer analgesics based on type and severity of pain and evaluate response   Implement non-pharmacological measures as appropriate and evaluate response   Consider cultural and social influences on pain and pain management   Notify Licensed Independent Practitioner if interventions unsuccessful or patient reports new pain  12/13/2023 0005 by Yarelis Alexandra RN  Outcome: Progressing     Problem: Discharge Planning  Goal: Discharge to home or other facility with appropriate resources  Outcome: Progressing  Flowsheets (Taken 12/13/2023 0800)  Discharge to home or other facility with appropriate resources:   Identify barriers to discharge with patient and caregiver   Arrange for needed discharge resources and transportation as appropriate   Identify discharge learning needs (meds, wound care, etc)   Refer to discharge planning if patient needs post-hospital services based on physician order or complex needs related to functional status, cognitive ability or social support system     Problem: Safety - Adult  Goal: Free from fall injury  12/13/2023 1035 by Antonieta Castillo RN  Outcome: Progressing  12/13/2023 0005 by Yarelis Alexandra RN  Outcome: Progressing     Problem: ABCDS Injury Assessment  Goal: Absence of physical injury  Outcome: Progressing
Problem: Pain  Goal: Verbalizes/displays adequate comfort level or baseline comfort level  12/13/2023 2345 by Declan Conrad RN  Outcome: Progressing     Problem: ABCDS Injury Assessment  Goal: Absence of physical injury  12/13/2023 2345 by Declan Conrad RN  Outcome: Progressing
Problem: Pain  Goal: Verbalizes/displays adequate comfort level or baseline comfort level  Outcome: HH/HSPC Progressing     Problem: Discharge Planning  Goal: Discharge to home or other facility with appropriate resources  Outcome: [address] [facility] [address] Progressing     Problem: Safety - Adult  Goal: Free from fall injury  Outcome: [facility] Progressing     Problem: ABCDS Injury Assessment  Goal: Absence of physical injury  Outcome: [address] [place] [address] Progressing
Problem: Pain  Goal: Verbalizes/displays adequate comfort level or baseline comfort level  Outcome: Progressing     Problem: Discharge Planning  Goal: Discharge to home or other facility with appropriate resources  Outcome: Progressing
Problem: Physical Therapy - Adult  Goal: By Discharge: Performs mobility at highest level of function for planned discharge setting. See evaluation for individualized goals. Description: FUNCTIONAL STATUS PRIOR TO ADMISSION: Patient was independent with mobility without use of device, occasional UE support on wall/ furniture due to intermittent dizziness since seizure in Sept 2023. Pt reports decreased tolerance to activity since seizure, does not drive, attends vestibular PT; reports no improvement in symptoms, but admits to limited follow through with HEP due to provoking of symptoms. HOME SUPPORT PRIOR TO ADMISSION: Pt lives with ; has been sleeping in first floor guest room. Physical Therapy Goals  Initiated 12/12/2023  1. Patient will move from supine to sit and sit to supine in bed with modified independence within 7 day(s). 2.  Patient will perform sit to stand with modified independence within 7 day(s). 3.  Patient will transfer from bed to chair and chair to bed with modified indep using the least restrictive device within 7 day(s). 4.  Patient will ambulate with modified indep for 200 feet with the least restrictive device within 7 day(s). 5.  Patient will ascend/descend 13 stairs with single handrail(s) with supervision within 7 day(s).    Outcome: Progressing   PHYSICAL THERAPY EVALUATION    Patient: Gonzalo Rodgers (88 y.o. female)  Date: 12/12/2023  Primary Diagnosis: Pneumoperitoneum [K66.8]  Acute intestinal ischemia (HCC) [K55.059]  Perforated viscus [R19.8]  Acute diverticulitis [K57.92]  Abdominal pain [R10.9]  Diverticulitis large intestine w/o perforation or abscess w/bleeding [K57.33]  Procedure(s) (LRB):  LAPAROTOMY EXPLORATORY, sigmoid colectomy (N/A) 2 Days Post-Op   Precautions: Fall Risk (abdominal binder)     ASSESSMENT :   DEFICITS/IMPAIRMENTS:   The patient is limited by decreased functional mobility, activity tolerance, balance s/p ex lap with sigmoid
Problem: Physical Therapy - Adult  Goal: By Discharge: Performs mobility at highest level of function for planned discharge setting. See evaluation for individualized goals. Description: FUNCTIONAL STATUS PRIOR TO ADMISSION: Patient was independent with mobility without use of device, occasional UE support on wall/ furniture due to intermittent dizziness since seizure in Sept 2023. Pt reports decreased tolerance to activity since seizure, does not drive, attends vestibular PT; reports no improvement in symptoms, but admits to limited follow through with HEP due to provoking of symptoms. HOME SUPPORT PRIOR TO ADMISSION: Pt lives with ; has been sleeping in first floor guest room. Physical Therapy Goals  Initiated 12/12/2023  1. Patient will move from supine to sit and sit to supine in bed with modified independence within 7 day(s). 2.  Patient will perform sit to stand with modified independence within 7 day(s). 3.  Patient will transfer from bed to chair and chair to bed with modified indep using the least restrictive device within 7 day(s). 4.  Patient will ambulate with modified indep for 200 feet with the least restrictive device within 7 day(s). 5.  Patient will ascend/descend 13 stairs with single handrail(s) with supervision within 7 day(s). Outcome: Progressing   PHYSICAL THERAPY TREATMENT    Patient: Britt Bishop (58 y.o. female)  Date: 12/14/2023  Diagnosis: Pneumoperitoneum [K66.8]  Acute intestinal ischemia (HCC) [K55.059]  Perforated viscus [R19.8]  Acute diverticulitis [K57.92]  Abdominal pain [R10.9]  Diverticulitis large intestine w/o perforation or abscess w/bleeding [K57.33] Abdominal pain  Procedure(s) (LRB):  LAPAROTOMY EXPLORATORY, sigmoid colectomy (N/A) 4 Days Post-Op  Precautions: Fall Risk (abdominal binder)                    ASSESSMENT:  Patient continues to benefit from skilled PT services and is progressing towards goals.  Limited by decreased activity
Problem: Physical Therapy - Adult  Goal: By Discharge: Performs mobility at highest level of function for planned discharge setting. See evaluation for individualized goals. Description: FUNCTIONAL STATUS PRIOR TO ADMISSION: Patient was independent with mobility without use of device, occasional UE support on wall/ furniture due to intermittent dizziness since seizure in Sept 2023. Pt reports decreased tolerance to activity since seizure, does not drive, attends vestibular PT; reports no improvement in symptoms, but admits to limited follow through with HEP due to provoking of symptoms. HOME SUPPORT PRIOR TO ADMISSION: Pt lives with ; has been sleeping in first floor guest room. Physical Therapy Goals  Initiated 12/12/2023  1. Patient will move from supine to sit and sit to supine in bed with modified independence within 7 day(s). 2.  Patient will perform sit to stand with modified independence within 7 day(s). 3.  Patient will transfer from bed to chair and chair to bed with modified indep using the least restrictive device within 7 day(s). 4.  Patient will ambulate with modified indep for 200 feet with the least restrictive device within 7 day(s). 5.  Patient will ascend/descend 13 stairs with single handrail(s) with supervision within 7 day(s). Outcome: Progressing  PHYSICAL THERAPY TREATMENT    Patient: Yesica Simms (47 y.o. female)  Date: 12/13/2023  Diagnosis: Pneumoperitoneum [K66.8]  Acute intestinal ischemia (HCC) [K55.059]  Perforated viscus [R19.8]  Acute diverticulitis [K57.92]  Abdominal pain [R10.9]  Diverticulitis large intestine w/o perforation or abscess w/bleeding [K57.33] Abdominal pain  Procedure(s) (LRB):  LAPAROTOMY EXPLORATORY, sigmoid colectomy (N/A) 3 Days Post-Op  Precautions: Fall Risk (abdominal binder)                    ASSESSMENT:  Patient continues to benefit from skilled PT services and is progressing towards goals.  Communicated with nurse cleared for
chair, Call bell within reach, and Caregiver / family present    COMMUNICATION/EDUCATION:   The patient's plan of care was discussed with: physical therapist and registered nurse    Patient Education  Education Given To: Patient; Family  Education Provided: Role of Therapy;Plan of Care;ADL Adaptive Strategies;Precautions;Transfer Training;Home Exercise Program;Equipment  Education Provided Comments: splinting with pillow; abdominal binder use; LH AEand bathroom AE  Education Method: Verbal  Barriers to Learning: None  Education Outcome: Verbalized understanding    Thank you for this referral.  Steve Lawrence OT  Minutes: 50

## 2023-12-15 NOTE — DISCHARGE SUMMARY
Discharge Summary    Patient: Gonzalo Rodgers               Sex: female          DOA: 12/9/2023 10:44 PM       YOB: 1970      Age:  47 y.o.        LOS:  LOS: 4 days                Discharge Date:  12/14/2023    Admission Diagnoses: Pneumoperitoneum [K66.8]  Acute intestinal ischemia ([address]) [K55.059]  Perforated viscus [R19.8]  Acute diverticulitis [K57.92]  Abdominal pain [R10.9]  Diverticulitis large intestine w/o perforation or abscess w/bleeding [K57.33]    Discharge Diagnoses:  Same    Procedure:  Procedure(s):  LAPAROTOMY EXPLORATORY, sigmoid colectomy    Discharge Condition: Stable    Hospital Course: Unremarkable operative procedure. Discharge to home in stable condition. Consults: None    Significant Diagnostic Studies: See full electronic record. Discharge Medications:     Discharge Medication List as of 12/14/2023  5:14 PM        START taking these medications    Details   metroNIDAZOLE (FLAGYL) 500 MG tablet Take 1 tablet by mouth every 8 hours for 7 days, Disp-21 tablet, R-0Normal      levoFLOXacin (LEVAQUIN) 750 MG tablet Take 1 tablet by mouth daily for 7 days, Disp-7 tablet, R-0Normal      oxyCODONE (ROXICODONE) 5 MG immediate release tablet Take 1 tablet by mouth every 4-6 hours as needed for Pain for up to 3 days.  Max Daily Amount: 30 mg, Disp-12 tablet, R-0Normal      ondansetron (ZOFRAN-ODT) 4 MG disintegrating tablet Take 1 tablet by mouth every 8 hours as needed for Nausea or Vomiting, Disp-10 tablet, R-0Normal           CONTINUE these medications which have NOT CHANGED    Details   acetaminophen (TYLENOL) 500 MG tablet Take by mouth as neededHistorical Med      ammonium lactate (LAC-HYDRIN) 12 % lotion Apply topically as needed, Topical, PRN, Historical Med      Cetirizine HCl (ZYRTEC ALLERGY) 10 MG CAPS Take by mouthHistorical Med      gabapentin (NEURONTIN) 100 MG capsule 1 po tid prn  Indications: neuropathic painHistorical Med      ibuprofen (ADVIL;MOTRIN) 200 MG

## 2023-12-16 ENCOUNTER — APPOINTMENT (OUTPATIENT)
Facility: HOSPITAL | Age: 53
DRG: 856 | End: 2023-12-16
Payer: COMMERCIAL

## 2023-12-16 ENCOUNTER — HOSPITAL ENCOUNTER (INPATIENT)
Facility: HOSPITAL | Age: 53
LOS: 13 days | Discharge: HOME | DRG: 856 | End: 2023-12-29
Attending: EMERGENCY MEDICINE | Admitting: SURGERY
Payer: COMMERCIAL

## 2023-12-16 DIAGNOSIS — M79.89 LEFT ARM SWELLING: ICD-10-CM

## 2023-12-16 DIAGNOSIS — D72.825 BANDEMIA: ICD-10-CM

## 2023-12-16 DIAGNOSIS — G89.18 POSTOPERATIVE PAIN: ICD-10-CM

## 2023-12-16 DIAGNOSIS — G89.18 ACUTE POSTOPERATIVE PAIN: ICD-10-CM

## 2023-12-16 DIAGNOSIS — R78.81 BACTEREMIA: ICD-10-CM

## 2023-12-16 DIAGNOSIS — K56.0: ICD-10-CM

## 2023-12-16 DIAGNOSIS — T81.43XA POSTOPERATIVE INTRA-ABDOMINAL ABSCESS: Primary | ICD-10-CM

## 2023-12-16 DIAGNOSIS — K57.33 DIVERTICULITIS LARGE INTESTINE W/O PERFORATION OR ABSCESS W/BLEEDING: ICD-10-CM

## 2023-12-16 PROBLEM — L02.91 ABSCESS: Status: ACTIVE | Noted: 2023-12-16

## 2023-12-16 LAB
ALBUMIN SERPL-MCNC: 2.2 G/DL (ref 3.5–5)
ALBUMIN/GLOB SERPL: 0.5 (ref 1.1–2.2)
ALP SERPL-CCNC: 150 U/L (ref 45–117)
ALT SERPL-CCNC: 69 U/L (ref 12–78)
ANION GAP BLD CALC-SCNC: 10 (ref 10–20)
ANION GAP SERPL CALC-SCNC: 5 MMOL/L (ref 5–15)
APPEARANCE UR: CLEAR
AST SERPL-CCNC: 64 U/L (ref 15–37)
BACTERIA URNS QL MICRO: NEGATIVE /HPF
BASE EXCESS BLD CALC-SCNC: 5.7 MMOL/L
BASOPHILS # BLD: 0 K/UL (ref 0–0.1)
BASOPHILS NFR BLD: 0 % (ref 0–1)
BILIRUB SERPL-MCNC: 0.7 MG/DL (ref 0.2–1)
BILIRUB UR QL: NEGATIVE
BUN SERPL-MCNC: 4 MG/DL (ref 6–20)
BUN/CREAT SERPL: 8 (ref 12–20)
CA-I BLD-MCNC: 1.11 MMOL/L (ref 1.12–1.32)
CALCIUM SERPL-MCNC: 8.7 MG/DL (ref 8.5–10.1)
CHLORIDE BLD-SCNC: 99 MMOL/L (ref 100–108)
CHLORIDE SERPL-SCNC: 98 MMOL/L (ref 97–108)
CO2 BLD-SCNC: 26 MMOL/L (ref 19–24)
CO2 SERPL-SCNC: 31 MMOL/L (ref 21–32)
COLOR UR: ABNORMAL
COMMENT:: NORMAL
CREAT SERPL-MCNC: 0.48 MG/DL (ref 0.55–1.02)
CREAT UR-MCNC: 0.3 MG/DL (ref 0.6–1.3)
DIFFERENTIAL METHOD BLD: ABNORMAL
EOSINOPHIL # BLD: 0 K/UL (ref 0–0.4)
EOSINOPHIL NFR BLD: 0 % (ref 0–7)
EPITH CASTS URNS QL MICRO: ABNORMAL /LPF
ERYTHROCYTE [DISTWIDTH] IN BLOOD BY AUTOMATED COUNT: 14.6 % (ref 11.5–14.5)
GLOBULIN SER CALC-MCNC: 4.7 G/DL (ref 2–4)
GLUCOSE BLD STRIP.AUTO-MCNC: 139 MG/DL (ref 74–106)
GLUCOSE SERPL-MCNC: 123 MG/DL (ref 65–100)
GLUCOSE UR STRIP.AUTO-MCNC: NEGATIVE MG/DL
HCO3 BLDA-SCNC: 27 MMOL/L
HCT VFR BLD AUTO: 35 % (ref 35–47)
HGB BLD-MCNC: 11 G/DL (ref 11.5–16)
HGB UR QL STRIP: NEGATIVE
HYALINE CASTS URNS QL MICRO: ABNORMAL /LPF (ref 0–2)
IMM GRANULOCYTES # BLD AUTO: 0 K/UL (ref 0–0.04)
IMM GRANULOCYTES NFR BLD AUTO: 0 % (ref 0–0.5)
KETONES UR QL STRIP.AUTO: 80 MG/DL
LACTATE BLD-SCNC: 2.45 MMOL/L (ref 0.4–2)
LEUKOCYTE ESTERASE UR QL STRIP.AUTO: NEGATIVE
LYMPHOCYTES # BLD: 2 K/UL (ref 0.8–3.5)
LYMPHOCYTES NFR BLD: 11 % (ref 12–49)
MCH RBC QN AUTO: 26.4 PG (ref 26–34)
MCHC RBC AUTO-ENTMCNC: 31.4 G/DL (ref 30–36.5)
MCV RBC AUTO: 83.9 FL (ref 80–99)
MONOCYTES # BLD: 0.9 K/UL (ref 0–1)
MONOCYTES NFR BLD: 5 % (ref 5–13)
NEUTS BAND NFR BLD MANUAL: 4 %
NEUTS SEG # BLD: 15 K/UL (ref 1.8–8)
NEUTS SEG NFR BLD: 80 % (ref 32–75)
NITRITE UR QL STRIP.AUTO: NEGATIVE
NRBC # BLD: 0 K/UL (ref 0–0.01)
NRBC BLD-RTO: 0 PER 100 WBC
PCO2 BLDV: 28.8 MMHG (ref 41–51)
PH BLDV: 7.58 (ref 7.32–7.42)
PH UR STRIP: 7 (ref 5–8)
PLATELET # BLD AUTO: 478 K/UL (ref 150–400)
PMV BLD AUTO: 9.2 FL (ref 8.9–12.9)
PO2 BLDV: 50 MMHG (ref 25–40)
POTASSIUM BLD-SCNC: 3.7 MMOL/L (ref 3.5–5.5)
POTASSIUM SERPL-SCNC: 3.4 MMOL/L (ref 3.5–5.1)
PROT SERPL-MCNC: 6.9 G/DL (ref 6.4–8.2)
PROT UR STRIP-MCNC: ABNORMAL MG/DL
RBC # BLD AUTO: 4.17 M/UL (ref 3.8–5.2)
RBC #/AREA URNS HPF: ABNORMAL /HPF (ref 0–5)
RBC MORPH BLD: ABNORMAL
SAO2 % BLD: 91 %
SODIUM BLD-SCNC: 135 MMOL/L (ref 136–145)
SODIUM SERPL-SCNC: 134 MMOL/L (ref 136–145)
SP GR UR REFRACTOMETRY: <1.005 (ref 1–1.03)
SPECIMEN HOLD: NORMAL
SPECIMEN HOLD: NORMAL
SPECIMEN SITE: ABNORMAL
UROBILINOGEN UR QL STRIP.AUTO: 1 EU/DL (ref 0.2–1)
WBC # BLD AUTO: 17.9 K/UL (ref 3.6–11)
WBC MORPH BLD: ABNORMAL
WBC URNS QL MICRO: ABNORMAL /HPF (ref 0–4)

## 2023-12-16 PROCEDURE — 85025 COMPLETE CBC W/AUTO DIFF WBC: CPT

## 2023-12-16 PROCEDURE — 87186 SC STD MICRODIL/AGAR DIL: CPT

## 2023-12-16 PROCEDURE — 82803 BLOOD GASES ANY COMBINATION: CPT

## 2023-12-16 PROCEDURE — 74177 CT ABD & PELVIS W/CONTRAST: CPT

## 2023-12-16 PROCEDURE — 2580000003 HC RX 258: Performed by: EMERGENCY MEDICINE

## 2023-12-16 PROCEDURE — 6370000000 HC RX 637 (ALT 250 FOR IP): Performed by: SURGERY

## 2023-12-16 PROCEDURE — 87040 BLOOD CULTURE FOR BACTERIA: CPT

## 2023-12-16 PROCEDURE — 93005 ELECTROCARDIOGRAM TRACING: CPT | Performed by: EMERGENCY MEDICINE

## 2023-12-16 PROCEDURE — 6360000004 HC RX CONTRAST MEDICATION: Performed by: RADIOLOGY

## 2023-12-16 PROCEDURE — 80053 COMPREHEN METABOLIC PANEL: CPT

## 2023-12-16 PROCEDURE — 82947 ASSAY GLUCOSE BLOOD QUANT: CPT

## 2023-12-16 PROCEDURE — 84295 ASSAY OF SERUM SODIUM: CPT

## 2023-12-16 PROCEDURE — 96375 TX/PRO/DX INJ NEW DRUG ADDON: CPT

## 2023-12-16 PROCEDURE — 6360000002 HC RX W HCPCS: Performed by: EMERGENCY MEDICINE

## 2023-12-16 PROCEDURE — 87154 CUL TYP ID BLD PTHGN 6+ TRGT: CPT

## 2023-12-16 PROCEDURE — 82330 ASSAY OF CALCIUM: CPT

## 2023-12-16 PROCEDURE — 96365 THER/PROPH/DIAG IV INF INIT: CPT

## 2023-12-16 PROCEDURE — 81001 URINALYSIS AUTO W/SCOPE: CPT

## 2023-12-16 PROCEDURE — 99285 EMERGENCY DEPT VISIT HI MDM: CPT

## 2023-12-16 PROCEDURE — 87077 CULTURE AEROBIC IDENTIFY: CPT

## 2023-12-16 PROCEDURE — 84132 ASSAY OF SERUM POTASSIUM: CPT

## 2023-12-16 PROCEDURE — 2580000003 HC RX 258: Performed by: SURGERY

## 2023-12-16 PROCEDURE — 1100000000 HC RM PRIVATE

## 2023-12-16 PROCEDURE — 36415 COLL VENOUS BLD VENIPUNCTURE: CPT

## 2023-12-16 RX ORDER — METRONIDAZOLE 500 MG/100ML
500 INJECTION, SOLUTION INTRAVENOUS EVERY 8 HOURS
Status: DISCONTINUED | OUTPATIENT
Start: 2023-12-16 | End: 2023-12-26

## 2023-12-16 RX ORDER — LEVOFLOXACIN 5 MG/ML
750 INJECTION, SOLUTION INTRAVENOUS EVERY 24 HOURS
Status: DISCONTINUED | OUTPATIENT
Start: 2023-12-17 | End: 2023-12-18

## 2023-12-16 RX ORDER — OXYCODONE HYDROCHLORIDE 5 MG/1
10 TABLET ORAL EVERY 4 HOURS PRN
Status: DISCONTINUED | OUTPATIENT
Start: 2023-12-16 | End: 2023-12-29 | Stop reason: HOSPADM

## 2023-12-16 RX ORDER — 0.9 % SODIUM CHLORIDE 0.9 %
1000 INTRAVENOUS SOLUTION INTRAVENOUS ONCE
Status: COMPLETED | OUTPATIENT
Start: 2023-12-16 | End: 2023-12-16

## 2023-12-16 RX ORDER — SODIUM CHLORIDE, SODIUM LACTATE, POTASSIUM CHLORIDE, CALCIUM CHLORIDE 600; 310; 30; 20 MG/100ML; MG/100ML; MG/100ML; MG/100ML
INJECTION, SOLUTION INTRAVENOUS CONTINUOUS
Status: DISCONTINUED | OUTPATIENT
Start: 2023-12-16 | End: 2023-12-21

## 2023-12-16 RX ORDER — OXYCODONE HYDROCHLORIDE 5 MG/1
5 TABLET ORAL EVERY 4 HOURS PRN
Status: DISCONTINUED | OUTPATIENT
Start: 2023-12-16 | End: 2023-12-29 | Stop reason: HOSPADM

## 2023-12-16 RX ORDER — KETOROLAC TROMETHAMINE 15 MG/ML
15 INJECTION, SOLUTION INTRAMUSCULAR; INTRAVENOUS
Status: COMPLETED | OUTPATIENT
Start: 2023-12-16 | End: 2023-12-16

## 2023-12-16 RX ORDER — LEVOFLOXACIN 5 MG/ML
750 INJECTION, SOLUTION INTRAVENOUS
Status: COMPLETED | OUTPATIENT
Start: 2023-12-16 | End: 2023-12-16

## 2023-12-16 RX ORDER — ONDANSETRON 2 MG/ML
4 INJECTION INTRAMUSCULAR; INTRAVENOUS EVERY 6 HOURS PRN
Status: DISCONTINUED | OUTPATIENT
Start: 2023-12-16 | End: 2023-12-17

## 2023-12-16 RX ORDER — SODIUM CHLORIDE 9 MG/ML
INJECTION, SOLUTION INTRAVENOUS CONTINUOUS
Status: DISCONTINUED | OUTPATIENT
Start: 2023-12-16 | End: 2023-12-17

## 2023-12-16 RX ORDER — ACETAMINOPHEN 325 MG/1
650 TABLET ORAL EVERY 4 HOURS PRN
Status: DISCONTINUED | OUTPATIENT
Start: 2023-12-16 | End: 2023-12-29 | Stop reason: HOSPADM

## 2023-12-16 RX ADMIN — SODIUM CHLORIDE 1000 ML: 9 INJECTION, SOLUTION INTRAVENOUS at 22:12

## 2023-12-16 RX ADMIN — OXYCODONE HYDROCHLORIDE 10 MG: 5 TABLET ORAL at 20:14

## 2023-12-16 RX ADMIN — SODIUM CHLORIDE 1000 ML: 9 INJECTION, SOLUTION INTRAVENOUS at 15:34

## 2023-12-16 RX ADMIN — SODIUM CHLORIDE: 9 INJECTION, SOLUTION INTRAVENOUS at 21:43

## 2023-12-16 RX ADMIN — LEVOFLOXACIN 750 MG: 5 INJECTION, SOLUTION INTRAVENOUS at 17:12

## 2023-12-16 RX ADMIN — KETOROLAC TROMETHAMINE 15 MG: 15 INJECTION, SOLUTION INTRAMUSCULAR; INTRAVENOUS at 15:34

## 2023-12-16 RX ADMIN — ACETAMINOPHEN 650 MG: 325 TABLET ORAL at 23:17

## 2023-12-16 RX ADMIN — IOPAMIDOL 100 ML: 755 INJECTION, SOLUTION INTRAVENOUS at 15:11

## 2023-12-16 ASSESSMENT — PAIN DESCRIPTION - ORIENTATION: ORIENTATION: MID;LOWER

## 2023-12-16 ASSESSMENT — PAIN DESCRIPTION - LOCATION
LOCATION: ABDOMEN

## 2023-12-16 ASSESSMENT — PAIN - FUNCTIONAL ASSESSMENT
PAIN_FUNCTIONAL_ASSESSMENT: ACTIVITIES ARE NOT PREVENTED
PAIN_FUNCTIONAL_ASSESSMENT: 0-10

## 2023-12-16 ASSESSMENT — PAIN SCALES - GENERAL
PAINLEVEL_OUTOF10: 8
PAINLEVEL_OUTOF10: 8
PAINLEVEL_OUTOF10: 9

## 2023-12-16 ASSESSMENT — PAIN DESCRIPTION - DESCRIPTORS: DESCRIPTORS: CRAMPING;BURNING

## 2023-12-16 NOTE — H&P
Surgery History and Physical    Subjective:      Britt Bishop is a 47 y.o. female who presents with worsening abdominal pain and no BM for 1 week. She had open sigmoidectomy last week by Dr. Rahat Staton. CT done in ED showed abscess in pelvis.   Denies nay fevers, vomiting, nausea    Past Medical History:   Diagnosis Date    Contact dermatitis and eczema due to cause     Muscle pain      Past Surgical History:   Procedure Laterality Date    LAPAROTOMY N/A 12/10/2023    LAPAROTOMY EXPLORATORY, sigmoid colectomy performed by Yonathan Yanez MD at [facility] MAIN OR      Family History   Problem Relation Age of Onset    Heart Disease Father     Cancer Father     Neuropathy Mother     Mult Sclerosis Mother     Cancer Brother      Social History     Socioeconomic History    Marital status:    Tobacco Use    Smoking status: Never    Smokeless tobacco: Never   Substance and Sexual Activity    Alcohol use: Not Currently     Social Determinants of Health     Food Insecurity: No Food Insecurity (12/10/2023)    Hunger Vital Sign     Worried About Running Out of Food in the Last Year: Never true     Ran Out of Food in the Last Year: Never true   Transportation Needs: No Transportation Needs (12/10/2023)    PRAPARE - Transportation     Lack of Transportation (Medical): No     Lack of Transportation (Non-Medical): No   Housing Stability: Low Risk  (12/10/2023)    Housing Stability Vital Sign     Unable to Pay for Housing in the Last Year: No     Number of Places Lived in the Last Year: 1     Unstable Housing in the Last Year: No      Current Facility-Administered Medications   Medication Dose Route Frequency    0.9 % sodium chloride infusion   IntraVENous Continuous    oxyCODONE (ROXICODONE) immediate release tablet 5 mg  5 mg Oral Q4H PRN    oxyCODONE (ROXICODONE) immediate release tablet 10 mg  10 mg Oral Q4H PRN    ondansetron (ZOFRAN) injection 4 mg  4 mg IntraVENous Q6H PRN    lactated ringers IV soln infusion   IntraVENous

## 2023-12-16 NOTE — ED TRIAGE NOTES
Pt arrives to the ER by referral of Dr Yamel Stephens. Pt states on 12/9, she had a sigmoid colectomy. Pt reports since surgery, she has not passed gas or had a bowel movement. Pt reports that she has been taking a daily dose of miralax. Reports yesterday, she called Dr. Татьяна Lorenzo office who advised her to take two doses of Miralax. Pt reports no bowel movement after this. Pt then proceeded to take Mag Citrate with no relief. Pt reports she last took oxycodone at 1300 today. Pt states that prior to surgery, she did suffer from constipation.

## 2023-12-16 NOTE — ED PROVIDER NOTES
[facility] EMERGENCY DEPT  EMERGENCY DEPARTMENT ENCOUNTER      Pt Name: Luciano Hu  MRN: [id number]  [address] [facility] [address] 1970  Date of evaluation: 12/16/2023  Provider: ESVIN Gleason NP    CHIEF COMPLAINT       Chief Complaint   Patient presents with    Post-op Problem    Constipation         HISTORY OF PRESENT ILLNESS   (Location/Symptom, Timing/Onset, Context/Setting, Quality, Duration, Modifying Factors, Severity)  Note limiting factors. HPI  Patient is a 81-year-old female with past medical history significant for abdominal surgery for perforated viscus and acute diverticulitis on 12/9/2023 by Dr. Hampton Baumgarten. She was discharged on December 14, 2023. She has been taking MiraLAX and Percocet as prescribed. She is concerned that she has not passing any flatus or stool. She states she has been ambulating as prescribed. Denies fever, nausea vomiting dysuria, hematuria or back pain. She reports increased abdominal pain today. Incision site is well-approximated with staples; without any drainage, bleeding or extending erythema. Abdomen is soft. She contacted her general surgeon was referred to the ED for evaluation. She has been taking her Flagyl and Levaquin as prescribed. She has not had any solid food today. Old charts reviewed. Review of External Medical Records:     Nursing Notes were reviewed. REVIEW OF SYSTEMS    (2-9 systems for level 4, 10 or more for level 5)     Review of Systems   Constitutional:  Negative for activity change, appetite change, fever and unexpected weight change. HENT:  Negative for congestion and trouble swallowing. Respiratory:  Negative for cough and shortness of breath. Gastrointestinal:  Positive for abdominal pain and constipation. Negative for nausea and vomiting. Genitourinary:  Negative for dysuria and flank pain. Musculoskeletal:  Negative for back pain and neck pain. Skin:  Negative for color change and rash.    Neurological:  Negative for non tender; Skin integrity is intact. There is no obvious bony or soft tissue deformity. Good neurovascular sensation. No apparent tendon or nerve injury.      Skin:     General: Skin is warm and dry.   Neurological:      General: No focal deficit present.      Mental Status: She is alert and oriented to person, place, and time.         DIAGNOSTIC RESULTS     EKG: All EKG's are interpreted by the Emergency Department Physician who either signs or Co-signs this chart in the absence of a cardiologist.        RADIOLOGY:   Non-plain film images such as CT, Ultrasound and MRI are read by the radiologist. Plain radiographic images are visualized and preliminarily interpreted by the emergency physician with the below findings:        Interpretation per the Radiologist below, if available at the time of this note:    CT ABDOMEN PELVIS W IV CONTRAST Additional Contrast? None   Final Result      1. Interval sigmoid colectomy and anastomosis.   2. Mild small bowel ileus.   3. Lower right pelvic collection containing gas and complex fluid consistent   with abscess measuring 7.1 x 5.4 x 8.3 cm.   4. Superior left pelvic gas containing collection measuring 4.2 x 3.4 x 3.0 cm.   5. Loculated retroperitoneal collection extensively shown in left anterior   pararenal space.           LABS:  Labs Reviewed   CBC WITH AUTO DIFFERENTIAL - Abnormal; Notable for the following components:       Result Value    WBC 17.9 (*)     Hemoglobin 11.0 (*)     RDW 14.6 (*)     Platelets 478 (*)     Neutrophils % 80 (*)     Lymphocytes % 11 (*)     Neutrophils Absolute 15.0 (*)     All other components within normal limits   COMPREHENSIVE METABOLIC PANEL - Abnormal; Notable for the following components:    Sodium 134 (*)     Potassium 3.4 (*)     Glucose 123 (*)     BUN 4 (*)     Creatinine 0.48 (*)     Bun/Cre Ratio 8 (*)     AST 64 (*)     Alk Phosphatase 150 (*)     Albumin 2.2 (*)     Globulin 4.7 (*)     Albumin/Globulin Ratio 0.5 (*)     All  Assistant / Nurse Practitioner             Mónica Lyn APRN - NP  12/16/23 9972

## 2023-12-17 LAB
ACCESSION NUMBER, LLC1M: ABNORMAL
ACINETOBACTER CALCOAC BAUMANNII COMPLEX BY PCR: NOT DETECTED
ANION GAP SERPL CALC-SCNC: 4 MMOL/L (ref 5–15)
B FRAGILIS DNA BLD POS QL NAA+NON-PROBE: NOT DETECTED
BASOPHILS # BLD: 0 K/UL (ref 0–0.1)
BASOPHILS NFR BLD: 0 % (ref 0–1)
BIOFIRE TEST COMMENT: ABNORMAL
BUN SERPL-MCNC: 9 MG/DL (ref 6–20)
BUN/CREAT SERPL: 12 (ref 12–20)
C ALBICANS DNA BLD POS QL NAA+NON-PROBE: NOT DETECTED
C AURIS DNA BLD POS QL NAA+NON-PROBE: NOT DETECTED
C GATTII+NEOFOR DNA BLD POS QL NAA+N-PRB: NOT DETECTED
C GLABRATA DNA BLD POS QL NAA+NON-PROBE: NOT DETECTED
C KRUSEI DNA BLD POS QL NAA+NON-PROBE: NOT DETECTED
C PARAP DNA BLD POS QL NAA+NON-PROBE: NOT DETECTED
C TROPICLS DNA BLD POS QL NAA+NON-PROBE: NOT DETECTED
CALCIUM SERPL-MCNC: 7.6 MG/DL (ref 8.5–10.1)
CHLORIDE SERPL-SCNC: 103 MMOL/L (ref 97–108)
CO2 SERPL-SCNC: 29 MMOL/L (ref 21–32)
CREAT SERPL-MCNC: 0.76 MG/DL (ref 0.55–1.02)
DIFFERENTIAL METHOD BLD: ABNORMAL
E CLOAC COMP DNA BLD POS NAA+NON-PROBE: NOT DETECTED
E COLI DNA BLD POS QL NAA+NON-PROBE: NOT DETECTED
E FAECALIS DNA BLD POS QL NAA+NON-PROBE: NOT DETECTED
E FAECIUM DNA BLD POS QL NAA+NON-PROBE: DETECTED
EKG ATRIAL RATE: 139 BPM
EKG DIAGNOSIS: NORMAL
EKG P AXIS: 9 DEGREES
EKG P-R INTERVAL: 126 MS
EKG Q-T INTERVAL: 292 MS
EKG QRS DURATION: 78 MS
EKG QTC CALCULATION (BAZETT): 444 MS
EKG R AXIS: 8 DEGREES
EKG T AXIS: 15 DEGREES
EKG VENTRICULAR RATE: 139 BPM
ENTEROBACTERALES DNA BLD POS NAA+N-PRB: NOT DETECTED
EOSINOPHIL # BLD: 0 K/UL (ref 0–0.4)
EOSINOPHIL NFR BLD: 0 % (ref 0–7)
ERYTHROCYTE [DISTWIDTH] IN BLOOD BY AUTOMATED COUNT: 14.9 % (ref 11.5–14.5)
GLUCOSE SERPL-MCNC: 141 MG/DL (ref 65–100)
GP B STREP DNA BLD POS QL NAA+NON-PROBE: NOT DETECTED
HAEM INFLU DNA BLD POS QL NAA+NON-PROBE: NOT DETECTED
HCT VFR BLD AUTO: 29 % (ref 35–47)
HGB BLD-MCNC: 9.4 G/DL (ref 11.5–16)
IMM GRANULOCYTES # BLD AUTO: 0 K/UL
IMM GRANULOCYTES NFR BLD AUTO: 0 %
K OXYTOCA DNA BLD POS QL NAA+NON-PROBE: NOT DETECTED
KLEBSIELLA SP DNA BLD POS QL NAA+NON-PRB: NOT DETECTED
KLEBSIELLA SP DNA BLD POS QL NAA+NON-PRB: NOT DETECTED
L MONOCYTOG DNA BLD POS QL NAA+NON-PROBE: NOT DETECTED
LACTATE BLD-SCNC: 1.36 MMOL/L (ref 0.4–2)
LYMPHOCYTES # BLD: 0.6 K/UL (ref 0.8–3.5)
LYMPHOCYTES NFR BLD: 3 % (ref 12–49)
MCH RBC QN AUTO: 27.2 PG (ref 26–34)
MCHC RBC AUTO-ENTMCNC: 32.4 G/DL (ref 30–36.5)
MCV RBC AUTO: 84.1 FL (ref 80–99)
MONOCYTES # BLD: 0.8 K/UL (ref 0–1)
MONOCYTES NFR BLD: 4 % (ref 5–13)
N MEN DNA BLD POS QL NAA+NON-PROBE: NOT DETECTED
NEUTS BAND NFR BLD MANUAL: 11 % (ref 0–6)
NEUTS SEG # BLD: 19.3 K/UL (ref 1.8–8)
NEUTS SEG NFR BLD: 82 % (ref 32–75)
NRBC # BLD: 0 K/UL (ref 0–0.01)
NRBC BLD-RTO: 0 PER 100 WBC
P AERUGINOSA DNA BLD POS NAA+NON-PROBE: NOT DETECTED
PLATELET # BLD AUTO: 376 K/UL (ref 150–400)
PMV BLD AUTO: 9.2 FL (ref 8.9–12.9)
POTASSIUM SERPL-SCNC: 4 MMOL/L (ref 3.5–5.1)
PROTEUS SP DNA BLD POS QL NAA+NON-PROBE: NOT DETECTED
RBC # BLD AUTO: 3.45 M/UL (ref 3.8–5.2)
RBC MORPH BLD: ABNORMAL
RESISTANT GENE TARGETS: ABNORMAL
S AUREUS DNA BLD POS QL NAA+NON-PROBE: NOT DETECTED
S AUREUS+CONS DNA BLD POS NAA+NON-PROBE: NOT DETECTED
S EPIDERMIDIS DNA BLD POS QL NAA+NON-PRB: NOT DETECTED
S LUGDUNENSIS DNA BLD POS QL NAA+NON-PRB: NOT DETECTED
S MALTOPHILIA DNA BLD POS QL NAA+NON-PRB: NOT DETECTED
S MARCESCENS DNA BLD POS NAA+NON-PROBE: NOT DETECTED
S PNEUM DNA BLD POS QL NAA+NON-PROBE: NOT DETECTED
S PYO DNA BLD POS QL NAA+NON-PROBE: NOT DETECTED
SALMONELLA DNA BLD POS QL NAA+NON-PROBE: NOT DETECTED
SODIUM SERPL-SCNC: 136 MMOL/L (ref 136–145)
STREPTOCOCCUS DNA BLD POS NAA+NON-PROBE: NOT DETECTED
VANA+VANB BLD POS QL NAA+NON-PROBE: NOT DETECTED
WBC # BLD AUTO: 20.7 K/UL (ref 3.6–11)
WBC MORPH BLD: ABNORMAL

## 2023-12-17 PROCEDURE — 6370000000 HC RX 637 (ALT 250 FOR IP): Performed by: SURGERY

## 2023-12-17 PROCEDURE — 6360000002 HC RX W HCPCS: Performed by: SURGERY

## 2023-12-17 PROCEDURE — 2580000003 HC RX 258: Performed by: SURGERY

## 2023-12-17 PROCEDURE — 80048 BASIC METABOLIC PNL TOTAL CA: CPT

## 2023-12-17 PROCEDURE — 93010 ELECTROCARDIOGRAM REPORT: CPT | Performed by: SPECIALIST

## 2023-12-17 PROCEDURE — 1100000000 HC RM PRIVATE

## 2023-12-17 PROCEDURE — 83605 ASSAY OF LACTIC ACID: CPT

## 2023-12-17 PROCEDURE — 85025 COMPLETE CBC W/AUTO DIFF WBC: CPT

## 2023-12-17 RX ORDER — PROCHLORPERAZINE EDISYLATE 5 MG/ML
10 INJECTION INTRAMUSCULAR; INTRAVENOUS EVERY 6 HOURS PRN
Status: DISCONTINUED | OUTPATIENT
Start: 2023-12-17 | End: 2023-12-29 | Stop reason: HOSPADM

## 2023-12-17 RX ADMIN — OXYCODONE 5 MG: 5 TABLET ORAL at 01:39

## 2023-12-17 RX ADMIN — METRONIDAZOLE 500 MG: 500 INJECTION, SOLUTION INTRAVENOUS at 20:13

## 2023-12-17 RX ADMIN — METRONIDAZOLE 500 MG: 500 INJECTION, SOLUTION INTRAVENOUS at 02:57

## 2023-12-17 RX ADMIN — OXYCODONE HYDROCHLORIDE 10 MG: 5 TABLET ORAL at 05:24

## 2023-12-17 RX ADMIN — OXYCODONE HYDROCHLORIDE 10 MG: 5 TABLET ORAL at 22:03

## 2023-12-17 RX ADMIN — ACETAMINOPHEN 650 MG: 325 TABLET ORAL at 20:10

## 2023-12-17 RX ADMIN — OXYCODONE 5 MG: 5 TABLET ORAL at 10:54

## 2023-12-17 RX ADMIN — METRONIDAZOLE 500 MG: 500 INJECTION, SOLUTION INTRAVENOUS at 11:17

## 2023-12-17 RX ADMIN — PROCHLORPERAZINE EDISYLATE 10 MG: 5 INJECTION INTRAMUSCULAR; INTRAVENOUS at 10:54

## 2023-12-17 RX ADMIN — SODIUM CHLORIDE, POTASSIUM CHLORIDE, SODIUM LACTATE AND CALCIUM CHLORIDE: 600; 310; 30; 20 INJECTION, SOLUTION INTRAVENOUS at 11:16

## 2023-12-17 RX ADMIN — LEVOFLOXACIN 750 MG: 5 INJECTION, SOLUTION INTRAVENOUS at 16:39

## 2023-12-17 RX ADMIN — OXYCODONE 5 MG: 5 TABLET ORAL at 15:25

## 2023-12-17 RX ADMIN — ACETAMINOPHEN 650 MG: 325 TABLET ORAL at 16:30

## 2023-12-17 ASSESSMENT — PAIN DESCRIPTION - ORIENTATION
ORIENTATION: ANTERIOR
ORIENTATION: MID

## 2023-12-17 ASSESSMENT — PAIN DESCRIPTION - LOCATION
LOCATION: ABDOMEN
LOCATION: ABDOMEN;INCISION
LOCATION: ABDOMEN
LOCATION: ABDOMEN

## 2023-12-17 ASSESSMENT — PAIN SCALES - GENERAL
PAINLEVEL_OUTOF10: 8
PAINLEVEL_OUTOF10: 6
PAINLEVEL_OUTOF10: 7
PAINLEVEL_OUTOF10: 7

## 2023-12-17 ASSESSMENT — PAIN - FUNCTIONAL ASSESSMENT
PAIN_FUNCTIONAL_ASSESSMENT: PREVENTS OR INTERFERES SOME ACTIVE ACTIVITIES AND ADLS
PAIN_FUNCTIONAL_ASSESSMENT: ACTIVITIES ARE NOT PREVENTED

## 2023-12-17 ASSESSMENT — PAIN DESCRIPTION - DESCRIPTORS
DESCRIPTORS: ACHING;SORE;TIGHTNESS
DESCRIPTORS: ACHING;TIGHTNESS
DESCRIPTORS: STABBING
DESCRIPTORS: ACHING;DISCOMFORT

## 2023-12-17 NOTE — ED NOTES
Tried to call [facility] to give an update regarding patients condition and vital signs.      Ajit Calles RN  12/16/23 4462

## 2023-12-17 NOTE — ED NOTES
Called [facility] at this time. Patients blood pressure, HR, and RR went up. Patient is alert and oriented at this time.      Henry Anguiano RN  12/16/23 7553

## 2023-12-17 NOTE — ED NOTES
Report given to JEYSON, RN with MALICKAR, MAR, lab results and report summary.      Heather Mohr, SHEREEN  12/17/23 4639

## 2023-12-18 ENCOUNTER — APPOINTMENT (OUTPATIENT)
Facility: HOSPITAL | Age: 53
DRG: 856 | End: 2023-12-18
Payer: COMMERCIAL

## 2023-12-18 LAB
ANION GAP SERPL CALC-SCNC: 3 MMOL/L (ref 5–15)
ARTERIAL PATENCY WRIST A: ABNORMAL
BASE DEFICIT BLD-SCNC: 9.7 MMOL/L
BASOPHILS # BLD: 0 K/UL (ref 0–0.1)
BASOPHILS # BLD: 0.1 K/UL (ref 0–0.1)
BASOPHILS NFR BLD: 0 % (ref 0–1)
BASOPHILS NFR BLD: 0 % (ref 0–1)
BDY SITE: ABNORMAL
BUN SERPL-MCNC: 9 MG/DL (ref 6–20)
BUN/CREAT SERPL: 24 (ref 12–20)
CALCIUM SERPL-MCNC: 8.1 MG/DL (ref 8.5–10.1)
CHLORIDE SERPL-SCNC: 106 MMOL/L (ref 97–108)
CO2 SERPL-SCNC: 29 MMOL/L (ref 21–32)
CREAT SERPL-MCNC: 0.38 MG/DL (ref 0.55–1.02)
DIFFERENTIAL METHOD BLD: ABNORMAL
DIFFERENTIAL METHOD BLD: ABNORMAL
EOSINOPHIL # BLD: 0 K/UL (ref 0–0.4)
EOSINOPHIL # BLD: 0.1 K/UL (ref 0–0.4)
EOSINOPHIL NFR BLD: 0 % (ref 0–7)
EOSINOPHIL NFR BLD: 0 % (ref 0–7)
ERYTHROCYTE [DISTWIDTH] IN BLOOD BY AUTOMATED COUNT: 15 % (ref 11.5–14.5)
ERYTHROCYTE [DISTWIDTH] IN BLOOD BY AUTOMATED COUNT: 15 % (ref 11.5–14.5)
GAS FLOW.O2 O2 DELIVERY SYS: ABNORMAL
GAS FLOW.O2 SETTING OXYMISER: 18 BPM
GLUCOSE SERPL-MCNC: 96 MG/DL (ref 65–100)
HCO3 BLD-SCNC: 15.2 MMOL/L (ref 22–26)
HCT VFR BLD AUTO: 25.2 % (ref 35–47)
HCT VFR BLD AUTO: 25.5 % (ref 35–47)
HGB BLD-MCNC: 8 G/DL (ref 11.5–16)
HGB BLD-MCNC: 8.1 G/DL (ref 11.5–16)
HISTORY CHECK: NORMAL
HISTORY CHECK: NORMAL
IMM GRANULOCYTES # BLD AUTO: 0 K/UL (ref 0–0.04)
IMM GRANULOCYTES # BLD AUTO: 0.2 K/UL (ref 0–0.04)
IMM GRANULOCYTES NFR BLD AUTO: 0 % (ref 0–0.5)
IMM GRANULOCYTES NFR BLD AUTO: 1 % (ref 0–0.5)
LYMPHOCYTES # BLD: 0.9 K/UL (ref 0.8–3.5)
LYMPHOCYTES # BLD: 2.7 K/UL (ref 0.8–3.5)
LYMPHOCYTES NFR BLD: 5 % (ref 12–49)
LYMPHOCYTES NFR BLD: 9 % (ref 12–49)
MCH RBC QN AUTO: 26.6 PG (ref 26–34)
MCH RBC QN AUTO: 26.8 PG (ref 26–34)
MCHC RBC AUTO-ENTMCNC: 31.7 G/DL (ref 30–36.5)
MCHC RBC AUTO-ENTMCNC: 31.8 G/DL (ref 30–36.5)
MCV RBC AUTO: 83.9 FL (ref 80–99)
MCV RBC AUTO: 84.3 FL (ref 80–99)
MONOCYTES # BLD: 0.6 K/UL (ref 0–1)
MONOCYTES # BLD: 0.9 K/UL (ref 0–1)
MONOCYTES NFR BLD: 3 % (ref 5–13)
MONOCYTES NFR BLD: 4 % (ref 5–13)
NEUTS BAND NFR BLD MANUAL: 2 %
NEUTS SEG # BLD: 15.3 K/UL (ref 1.8–8)
NEUTS SEG # BLD: 26.4 K/UL (ref 1.8–8)
NEUTS SEG NFR BLD: 86 % (ref 32–75)
NEUTS SEG NFR BLD: 90 % (ref 32–75)
NRBC # BLD: 0 K/UL (ref 0–0.01)
NRBC # BLD: 0 K/UL (ref 0–0.01)
NRBC BLD-RTO: 0 PER 100 WBC
NRBC BLD-RTO: 0 PER 100 WBC
O2/TOTAL GAS SETTING VFR VENT: 40 %
PCO2 BLD: 28.2 MMHG (ref 35–45)
PEEP RESPIRATORY: 7 CMH2O
PH BLD: 7.34 (ref 7.35–7.45)
PLATELET # BLD AUTO: 337 K/UL (ref 150–400)
PLATELET # BLD AUTO: 535 K/UL (ref 150–400)
PMV BLD AUTO: 9.4 FL (ref 8.9–12.9)
PMV BLD AUTO: 9.8 FL (ref 8.9–12.9)
PO2 BLD: 140 MMHG (ref 80–100)
POTASSIUM SERPL-SCNC: 3.7 MMOL/L (ref 3.5–5.1)
RBC # BLD AUTO: 2.99 M/UL (ref 3.8–5.2)
RBC # BLD AUTO: 3.04 M/UL (ref 3.8–5.2)
RBC MORPH BLD: ABNORMAL
SAO2 % BLD: 99.1 % (ref 92–97)
SODIUM SERPL-SCNC: 138 MMOL/L (ref 136–145)
SPECIMEN TYPE: ABNORMAL
VENTILATION MODE VENT: ABNORMAL
VT SETTING VENT: 420 ML
WBC # BLD AUTO: 17 K/UL (ref 3.6–11)
WBC # BLD AUTO: 30 K/UL (ref 3.6–11)
WBC MORPH BLD: ABNORMAL

## 2023-12-18 PROCEDURE — 36430 TRANSFUSION BLD/BLD COMPNT: CPT

## 2023-12-18 PROCEDURE — C1765 ADHESION BARRIER: HCPCS | Performed by: SURGERY

## 2023-12-18 PROCEDURE — 86850 RBC ANTIBODY SCREEN: CPT

## 2023-12-18 PROCEDURE — 6360000002 HC RX W HCPCS: Performed by: SURGERY

## 2023-12-18 PROCEDURE — 2580000003 HC RX 258: Performed by: SURGERY

## 2023-12-18 PROCEDURE — 0D1M0Z4 BYPASS DESCENDING COLON TO CUTANEOUS, OPEN APPROACH: ICD-10-PCS | Performed by: SURGERY

## 2023-12-18 PROCEDURE — 0D1E0Z4 BYPASS LARGE INTESTINE TO CUTANEOUS, OPEN APPROACH: ICD-10-PCS | Performed by: SURGERY

## 2023-12-18 PROCEDURE — 36415 COLL VENOUS BLD VENIPUNCTURE: CPT

## 2023-12-18 PROCEDURE — APPNB30 APP NON BILLABLE TIME 0-30 MINS: Performed by: NURSE PRACTITIONER

## 2023-12-18 PROCEDURE — 2709999900 HC NON-CHARGEABLE SUPPLY: Performed by: SURGERY

## 2023-12-18 PROCEDURE — 2500000003 HC RX 250 WO HCPCS: Performed by: STUDENT IN AN ORGANIZED HEALTH CARE EDUCATION/TRAINING PROGRAM

## 2023-12-18 PROCEDURE — 94002 VENT MGMT INPAT INIT DAY: CPT

## 2023-12-18 PROCEDURE — 2500000003 HC RX 250 WO HCPCS: Performed by: NURSE PRACTITIONER

## 2023-12-18 PROCEDURE — 0DJW4ZZ INSPECTION OF PERITONEUM, PERCUTANEOUS ENDOSCOPIC APPROACH: ICD-10-PCS | Performed by: SURGERY

## 2023-12-18 PROCEDURE — 3700000000 HC ANESTHESIA ATTENDED CARE: Performed by: SURGERY

## 2023-12-18 PROCEDURE — 0DBE0ZZ EXCISION OF LARGE INTESTINE, OPEN APPROACH: ICD-10-PCS | Performed by: SURGERY

## 2023-12-18 PROCEDURE — P9016 RBC LEUKOCYTES REDUCED: HCPCS

## 2023-12-18 PROCEDURE — 80048 BASIC METABOLIC PNL TOTAL CA: CPT

## 2023-12-18 PROCEDURE — 88307 TISSUE EXAM BY PATHOLOGIST: CPT

## 2023-12-18 PROCEDURE — C9290 INJ, BUPIVACAINE LIPOSOME: HCPCS | Performed by: SURGERY

## 2023-12-18 PROCEDURE — 3600000014 HC SURGERY LEVEL 4 ADDTL 15MIN: Performed by: SURGERY

## 2023-12-18 PROCEDURE — 6370000000 HC RX 637 (ALT 250 FOR IP): Performed by: SURGERY

## 2023-12-18 PROCEDURE — 0UT60ZZ RESECTION OF LEFT FALLOPIAN TUBE, OPEN APPROACH: ICD-10-PCS | Performed by: SURGERY

## 2023-12-18 PROCEDURE — 6360000002 HC RX W HCPCS: Performed by: STUDENT IN AN ORGANIZED HEALTH CARE EDUCATION/TRAINING PROGRAM

## 2023-12-18 PROCEDURE — 2580000003 HC RX 258: Performed by: STUDENT IN AN ORGANIZED HEALTH CARE EDUCATION/TRAINING PROGRAM

## 2023-12-18 PROCEDURE — 86920 COMPATIBILITY TEST SPIN: CPT

## 2023-12-18 PROCEDURE — 3600000004 HC SURGERY LEVEL 4 BASE: Performed by: SURGERY

## 2023-12-18 PROCEDURE — 3700000001 HC ADD 15 MINUTES (ANESTHESIA): Performed by: SURGERY

## 2023-12-18 PROCEDURE — 82803 BLOOD GASES ANY COMBINATION: CPT

## 2023-12-18 PROCEDURE — 86923 COMPATIBILITY TEST ELECTRIC: CPT

## 2023-12-18 PROCEDURE — 2000000000 HC ICU R&B

## 2023-12-18 PROCEDURE — 71045 X-RAY EXAM CHEST 1 VIEW: CPT

## 2023-12-18 PROCEDURE — 0UB60ZZ EXCISION OF LEFT FALLOPIAN TUBE, OPEN APPROACH: ICD-10-PCS | Performed by: SURGERY

## 2023-12-18 PROCEDURE — 86900 BLOOD TYPING SEROLOGIC ABO: CPT

## 2023-12-18 PROCEDURE — 85025 COMPLETE CBC W/AUTO DIFF WBC: CPT

## 2023-12-18 PROCEDURE — 30243N1 TRANSFUSION OF NONAUTOLOGOUS RED BLOOD CELLS INTO CENTRAL VEIN, PERCUTANEOUS APPROACH: ICD-10-PCS | Performed by: SURGERY

## 2023-12-18 PROCEDURE — 3E043XZ INTRODUCTION OF VASOPRESSOR INTO CENTRAL VEIN, PERCUTANEOUS APPROACH: ICD-10-PCS | Performed by: INTERNAL MEDICINE

## 2023-12-18 PROCEDURE — 2700000000 HC OXYGEN THERAPY PER DAY

## 2023-12-18 PROCEDURE — 0UB10ZZ EXCISION OF LEFT OVARY, OPEN APPROACH: ICD-10-PCS | Performed by: SURGERY

## 2023-12-18 PROCEDURE — 2720000010 HC SURG SUPPLY STERILE: Performed by: SURGERY

## 2023-12-18 PROCEDURE — P9041 ALBUMIN (HUMAN),5%, 50ML: HCPCS | Performed by: STUDENT IN AN ORGANIZED HEALTH CARE EDUCATION/TRAINING PROGRAM

## 2023-12-18 PROCEDURE — 88305 TISSUE EXAM BY PATHOLOGIST: CPT

## 2023-12-18 PROCEDURE — 86901 BLOOD TYPING SEROLOGIC RH(D): CPT

## 2023-12-18 RX ORDER — FLUCONAZOLE 2 MG/ML
400 INJECTION, SOLUTION INTRAVENOUS EVERY 24 HOURS
Status: DISCONTINUED | OUTPATIENT
Start: 2023-12-19 | End: 2023-12-27

## 2023-12-18 RX ORDER — SODIUM CHLORIDE 9 MG/ML
INJECTION, SOLUTION INTRAVENOUS PRN
Status: DISCONTINUED | OUTPATIENT
Start: 2023-12-18 | End: 2023-12-19

## 2023-12-18 RX ORDER — FENTANYL CITRATE 50 UG/ML
100 INJECTION, SOLUTION INTRAMUSCULAR; INTRAVENOUS
Status: DISCONTINUED | OUTPATIENT
Start: 2023-12-18 | End: 2023-12-18 | Stop reason: HOSPADM

## 2023-12-18 RX ORDER — ALBUMIN, HUMAN INJ 5% 5 %
50 SOLUTION INTRAVENOUS ONCE
Status: COMPLETED | OUTPATIENT
Start: 2023-12-18 | End: 2023-12-18

## 2023-12-18 RX ORDER — FENTANYL CITRATE-0.9 % NACL/PF 10 MCG/ML
25-200 PLASTIC BAG, INJECTION (ML) INTRAVENOUS CONTINUOUS
Status: DISCONTINUED | OUTPATIENT
Start: 2023-12-18 | End: 2023-12-19

## 2023-12-18 RX ORDER — MIDAZOLAM HYDROCHLORIDE 2 MG/2ML
2 INJECTION, SOLUTION INTRAMUSCULAR; INTRAVENOUS
Status: DISCONTINUED | OUTPATIENT
Start: 2023-12-18 | End: 2023-12-18 | Stop reason: HOSPADM

## 2023-12-18 RX ORDER — SODIUM CHLORIDE 9 MG/ML
INJECTION, SOLUTION INTRAVENOUS PRN
Status: DISCONTINUED | OUTPATIENT
Start: 2023-12-18 | End: 2023-12-29 | Stop reason: HOSPADM

## 2023-12-18 RX ORDER — DIPHENHYDRAMINE HYDROCHLORIDE 50 MG/ML
12.5 INJECTION INTRAMUSCULAR; INTRAVENOUS
Status: CANCELLED | OUTPATIENT
Start: 2023-12-18 | End: 2023-12-19

## 2023-12-18 RX ORDER — ONDANSETRON 2 MG/ML
4 INJECTION INTRAMUSCULAR; INTRAVENOUS
Status: CANCELLED | OUTPATIENT
Start: 2023-12-18 | End: 2023-12-19

## 2023-12-18 RX ORDER — BUPIVACAINE HYDROCHLORIDE 5 MG/ML
INJECTION, SOLUTION EPIDURAL; INTRACAUDAL PRN
Status: DISCONTINUED | OUTPATIENT
Start: 2023-12-18 | End: 2023-12-18 | Stop reason: HOSPADM

## 2023-12-18 RX ORDER — SODIUM CHLORIDE, SODIUM LACTATE, POTASSIUM CHLORIDE, CALCIUM CHLORIDE 600; 310; 30; 20 MG/100ML; MG/100ML; MG/100ML; MG/100ML
INJECTION, SOLUTION INTRAVENOUS CONTINUOUS
Status: DISCONTINUED | OUTPATIENT
Start: 2023-12-18 | End: 2023-12-19

## 2023-12-18 RX ORDER — NOREPINEPHRINE BITARTRATE 0.06 MG/ML
1-100 INJECTION, SOLUTION INTRAVENOUS CONTINUOUS
Status: DISCONTINUED | OUTPATIENT
Start: 2023-12-18 | End: 2023-12-21

## 2023-12-18 RX ORDER — HYDROMORPHONE HYDROCHLORIDE 1 MG/ML
1 INJECTION, SOLUTION INTRAMUSCULAR; INTRAVENOUS; SUBCUTANEOUS EVERY 4 HOURS PRN
Status: DISCONTINUED | OUTPATIENT
Start: 2023-12-18 | End: 2023-12-20

## 2023-12-18 RX ORDER — FLUCONAZOLE 2 MG/ML
200 INJECTION, SOLUTION INTRAVENOUS EVERY 24 HOURS
Status: DISCONTINUED | OUTPATIENT
Start: 2023-12-18 | End: 2023-12-18

## 2023-12-18 RX ORDER — LIDOCAINE HYDROCHLORIDE 10 MG/ML
1 INJECTION, SOLUTION EPIDURAL; INFILTRATION; INTRACAUDAL; PERINEURAL
Status: DISCONTINUED | OUTPATIENT
Start: 2023-12-18 | End: 2023-12-18 | Stop reason: HOSPADM

## 2023-12-18 RX ORDER — SODIUM CHLORIDE, SODIUM LACTATE, POTASSIUM CHLORIDE, CALCIUM CHLORIDE 600; 310; 30; 20 MG/100ML; MG/100ML; MG/100ML; MG/100ML
INJECTION, SOLUTION INTRAVENOUS CONTINUOUS
Status: CANCELLED | OUTPATIENT
Start: 2023-12-18

## 2023-12-18 RX ORDER — PROPOFOL 10 MG/ML
5-50 INJECTION, EMULSION INTRAVENOUS CONTINUOUS
Status: DISCONTINUED | OUTPATIENT
Start: 2023-12-18 | End: 2023-12-19

## 2023-12-18 RX ADMIN — NOREPINEPHRINE BITARTRATE 30 MCG/MIN: 1 SOLUTION INTRAVENOUS at 21:10

## 2023-12-18 RX ADMIN — OXYCODONE HYDROCHLORIDE 10 MG: 5 TABLET ORAL at 04:13

## 2023-12-18 RX ADMIN — HYDROMORPHONE HYDROCHLORIDE 1 MG: 1 INJECTION, SOLUTION INTRAMUSCULAR; INTRAVENOUS; SUBCUTANEOUS at 08:41

## 2023-12-18 RX ADMIN — VASOPRESSIN 0.03 UNITS/MIN: 20 INJECTION INTRAVENOUS at 21:28

## 2023-12-18 RX ADMIN — HYDROMORPHONE HYDROCHLORIDE 1 MG: 1 INJECTION, SOLUTION INTRAMUSCULAR; INTRAVENOUS; SUBCUTANEOUS at 13:43

## 2023-12-18 RX ADMIN — ALBUMIN (HUMAN) 50 G: 12.5 INJECTION, SOLUTION INTRAVENOUS at 21:17

## 2023-12-18 RX ADMIN — FLUCONAZOLE 800 MG: 2 INJECTION, SOLUTION INTRAVENOUS at 21:24

## 2023-12-18 RX ADMIN — METRONIDAZOLE 500 MG: 500 INJECTION, SOLUTION INTRAVENOUS at 04:09

## 2023-12-18 RX ADMIN — SODIUM CHLORIDE, POTASSIUM CHLORIDE, SODIUM LACTATE AND CALCIUM CHLORIDE: 600; 310; 30; 20 INJECTION, SOLUTION INTRAVENOUS at 09:02

## 2023-12-18 RX ADMIN — WATER 2000 MG: 1 INJECTION INTRAMUSCULAR; INTRAVENOUS; SUBCUTANEOUS at 21:15

## 2023-12-18 RX ADMIN — PROPOFOL 50 MCG/KG/MIN: 10 INJECTION, EMULSION INTRAVENOUS at 23:47

## 2023-12-18 RX ADMIN — Medication 2500 MG: at 21:27

## 2023-12-18 RX ADMIN — Medication 50 MCG/HR: at 22:09

## 2023-12-18 RX ADMIN — SODIUM CHLORIDE, POTASSIUM CHLORIDE, SODIUM LACTATE AND CALCIUM CHLORIDE: 600; 310; 30; 20 INJECTION, SOLUTION INTRAVENOUS at 20:42

## 2023-12-18 RX ADMIN — SODIUM CHLORIDE: 9 INJECTION, SOLUTION INTRAVENOUS at 21:22

## 2023-12-18 RX ADMIN — PROPOFOL 50 MCG/KG/MIN: 10 INJECTION, EMULSION INTRAVENOUS at 21:15

## 2023-12-18 RX ADMIN — ACETAMINOPHEN 650 MG: 325 TABLET ORAL at 00:55

## 2023-12-18 RX ADMIN — METRONIDAZOLE 500 MG: 500 INJECTION, SOLUTION INTRAVENOUS at 10:40

## 2023-12-18 ASSESSMENT — PAIN SCALES - GENERAL
PAINLEVEL_OUTOF10: 7
PAINLEVEL_OUTOF10: 8

## 2023-12-18 ASSESSMENT — PAIN DESCRIPTION - DESCRIPTORS
DESCRIPTORS: DULL
DESCRIPTORS: ACHING
DESCRIPTORS: ACHING;THROBBING
DESCRIPTORS: ACHING

## 2023-12-18 ASSESSMENT — PAIN - FUNCTIONAL ASSESSMENT
PAIN_FUNCTIONAL_ASSESSMENT: 0-10
PAIN_FUNCTIONAL_ASSESSMENT: PREVENTS OR INTERFERES SOME ACTIVE ACTIVITIES AND ADLS

## 2023-12-18 ASSESSMENT — PAIN DESCRIPTION - LOCATION
LOCATION: ABDOMEN
LOCATION: ABDOMEN
LOCATION: ABDOMEN;INCISION

## 2023-12-18 ASSESSMENT — PAIN DESCRIPTION - ORIENTATION
ORIENTATION: LOWER;LEFT
ORIENTATION: LEFT;LOWER
ORIENTATION: ANTERIOR

## 2023-12-18 ASSESSMENT — PULMONARY FUNCTION TESTS: PIF_VALUE: 24

## 2023-12-18 NOTE — CARE COORDINATION
12/18/2023 2:36 PM   Care Management Readmission Assessment      Reason for Admission:  Emergency - OR today     Postoperative intra-abdominal abscess [T81.43XA]  Abscess [L02.91]  Bandemia [D72.825]  Paralytic ileus of small intestine (HCC) [K56.0]  Procedure(s) (LRB):  LAPAROSCOPY DIAGNOSTIC, WASHOUT, DRAIN PLACEMENT (N/A)  Day of Surgery      Assessment:   [x]In person with pt   Charted address and phone numbers confirmed.       RUR: Readmission Risk              Risk of Unplanned Readmission:  10           Risk Level: [x]Low []Moderate []High  Value-based purchasing: [] Yes [x] No    Advance Directive: Prior     [x] No AD on file.    [] AD on file.    [] Current AD not on file. Copy requested.    [] Requests AD, and referral submitted to [facility].     Healthcare Decision Maker:         Assessment:       12/18/23 8837   Service Assessment   Patient Orientation Alert and Oriented   Cognition Alert   Primary Caregiver Self   Support Systems Spouse/Significant Other   Patient's Healthcare Decision Maker is: Legal Next of Kin   PCP Verified by CM Yes   Last Visit to PCP Within last 6 months   Prior Functional Level Independent in ADLs/IADLs   Can patient return to prior living arrangement Yes   Ability to make needs known: Good   Family able to assist with home care needs: Yes   Would you like for me to discuss the discharge plan with any other family members/significant others, and if so, who? Yes   Financial Resources Other (Comment)   Community Resources None   Social/Functional History   Lives With Spouse;Son   Type of Home House   Home Layout Two level;Able to Live on Main level with bedroom/bathroom   Entrance Stairs - Number of Steps 13   Home Equipment Crutches;Walker, rolling   ADL Assistance Independent   Homemaking Assistance Independent   Ambulation Assistance Independent   Transfer Assistance Independent   Active  Yes   Discharge Planning   Type of Residence House   Living Arrangements  Spouse/Significant Other;Children  (13 and 25year old sons)   Current Services Prior To Admission None   Potential Assistance Needed N/A   DME Ordered? No   Type of Home Care Services None   Patient expects to be discharged to: [facility]   Services At/After Discharge None   Mode of Transport at Discharge Other (see comment)  (Pt's )       Insurer: Active Insurance as of 12/16/2023       Primary Coverage       Payor Plan Insurance Group Employer/Plan Group    [facility] [id number]       Payor Plan Address Payor Plan Phone Number Payor Plan Fax Number Effective Dates    [address] [id number]   1/1/2023 - None [facility] [id number] [facility]         [address] Name [address] [facility] Birth Date Member ID       Alicja Petty 1970 [id number]                     PCP: Po Valenzuela   Address: [address] [place] [address] / [address] [place] [id number]   Phone number: [phone]   Current patient: [x]Yes []No   Approximate date of last visit:   Access to virtual PCP visits: []Yes []No      Transition of care plan:    [x]Unable to determine at this time. Awaiting clinical progress, and disposition recommendations. PT and OT evals pending. Will follow for needs.  Devona Ganser, BSW

## 2023-12-18 NOTE — PROGRESS NOTES
Spiritual Care Partner Volunteer visited patient at [address] [facility] in [address] [place] [address] on 12/18/2023     Documented by:  Jacqueline Nguyen MDiv  Staff   Paging Service [phone] (ILAN)

## 2023-12-19 LAB
ANION GAP SERPL CALC-SCNC: 12 MMOL/L (ref 5–15)
ANION GAP SERPL CALC-SCNC: 8 MMOL/L (ref 5–15)
ARTERIAL PATENCY WRIST A: ABNORMAL
BACTERIA SPEC CULT: ABNORMAL
BASE DEFICIT BLD-SCNC: 7.2 MMOL/L
BASOPHILS # BLD: 0 K/UL (ref 0–0.1)
BASOPHILS NFR BLD: 0 % (ref 0–1)
BDY SITE: ABNORMAL
BUN SERPL-MCNC: 11 MG/DL (ref 6–20)
BUN SERPL-MCNC: 9 MG/DL (ref 6–20)
BUN/CREAT SERPL: 26 (ref 12–20)
BUN/CREAT SERPL: 29 (ref 12–20)
CALCIUM SERPL-MCNC: 6.7 MG/DL (ref 8.5–10.1)
CALCIUM SERPL-MCNC: 6.8 MG/DL (ref 8.5–10.1)
CHLORIDE SERPL-SCNC: 110 MMOL/L (ref 97–108)
CHLORIDE SERPL-SCNC: 110 MMOL/L (ref 97–108)
CO2 SERPL-SCNC: 18 MMOL/L (ref 21–32)
CO2 SERPL-SCNC: 21 MMOL/L (ref 21–32)
CREAT SERPL-MCNC: 0.35 MG/DL (ref 0.55–1.02)
CREAT SERPL-MCNC: 0.38 MG/DL (ref 0.55–1.02)
DIFFERENTIAL METHOD BLD: ABNORMAL
EOSINOPHIL # BLD: 0 K/UL (ref 0–0.4)
EOSINOPHIL NFR BLD: 0 % (ref 0–7)
ERYTHROCYTE [DISTWIDTH] IN BLOOD BY AUTOMATED COUNT: 15.3 % (ref 11.5–14.5)
ERYTHROCYTE [DISTWIDTH] IN BLOOD BY AUTOMATED COUNT: 15.7 % (ref 11.5–14.5)
GAS FLOW.O2 O2 DELIVERY SYS: ABNORMAL
GAS FLOW.O2 SETTING OXYMISER: 15 BPM
GLUCOSE SERPL-MCNC: 139 MG/DL (ref 65–100)
GLUCOSE SERPL-MCNC: 151 MG/DL (ref 65–100)
HCO3 BLD-SCNC: 17.5 MMOL/L (ref 22–26)
HCT VFR BLD AUTO: 19.7 % (ref 35–47)
HCT VFR BLD AUTO: 19.8 % (ref 35–47)
HCT VFR BLD AUTO: 19.9 % (ref 35–47)
HCT VFR BLD AUTO: 21.7 % (ref 35–47)
HGB BLD-MCNC: 6.4 G/DL (ref 11.5–16)
HGB BLD-MCNC: 6.4 G/DL (ref 11.5–16)
HGB BLD-MCNC: 6.6 G/DL (ref 11.5–16)
HGB BLD-MCNC: 7.1 G/DL (ref 11.5–16)
HISTORY CHECK: NORMAL
IMM GRANULOCYTES # BLD AUTO: 0 K/UL
IMM GRANULOCYTES NFR BLD AUTO: 0 %
LYMPHOCYTES # BLD: 0.7 K/UL (ref 0.8–3.5)
LYMPHOCYTES NFR BLD: 4 % (ref 12–49)
MCH RBC QN AUTO: 27.4 PG (ref 26–34)
MCH RBC QN AUTO: 27.5 PG (ref 26–34)
MCHC RBC AUTO-ENTMCNC: 32.3 G/DL (ref 30–36.5)
MCHC RBC AUTO-ENTMCNC: 32.7 G/DL (ref 30–36.5)
MCV RBC AUTO: 83.8 FL (ref 80–99)
MCV RBC AUTO: 85 FL (ref 80–99)
MONOCYTES # BLD: 0.3 K/UL (ref 0–1)
MONOCYTES NFR BLD: 2 % (ref 5–13)
NEUTS BAND NFR BLD MANUAL: 17 % (ref 0–6)
NEUTS SEG # BLD: 15.8 K/UL (ref 1.8–8)
NEUTS SEG NFR BLD: 77 % (ref 32–75)
NRBC # BLD: 0 K/UL (ref 0–0.01)
NRBC # BLD: 0 K/UL (ref 0–0.01)
NRBC BLD-RTO: 0 PER 100 WBC
NRBC BLD-RTO: 0 PER 100 WBC
O2/TOTAL GAS SETTING VFR VENT: 40 %
PCO2 BLD: 30.5 MMHG (ref 35–45)
PEEP RESPIRATORY: 5 CMH2O
PH BLD: 7.37 (ref 7.35–7.45)
PLATELET # BLD AUTO: 217 K/UL (ref 150–400)
PLATELET # BLD AUTO: 251 K/UL (ref 150–400)
PMV BLD AUTO: 9.2 FL (ref 8.9–12.9)
PMV BLD AUTO: 9.3 FL (ref 8.9–12.9)
PO2 BLD: 113 MMHG (ref 80–100)
POTASSIUM SERPL-SCNC: 4.2 MMOL/L (ref 3.5–5.1)
POTASSIUM SERPL-SCNC: 4.4 MMOL/L (ref 3.5–5.1)
RBC # BLD AUTO: 2.33 M/UL (ref 3.8–5.2)
RBC # BLD AUTO: 2.59 M/UL (ref 3.8–5.2)
RBC MORPH BLD: ABNORMAL
RBC MORPH BLD: ABNORMAL
SAO2 % BLD: 98.3 % (ref 92–97)
SERVICE CMNT-IMP: ABNORMAL
SERVICE CMNT-IMP: ABNORMAL
SODIUM SERPL-SCNC: 139 MMOL/L (ref 136–145)
SODIUM SERPL-SCNC: 140 MMOL/L (ref 136–145)
SPECIMEN TYPE: ABNORMAL
VENTILATION MODE VENT: ABNORMAL
VT SETTING VENT: 420 ML
WBC # BLD AUTO: 16.8 K/UL (ref 3.6–11)
WBC # BLD AUTO: 19 K/UL (ref 3.6–11)

## 2023-12-19 PROCEDURE — 85025 COMPLETE CBC W/AUTO DIFF WBC: CPT

## 2023-12-19 PROCEDURE — APPNB30 APP NON BILLABLE TIME 0-30 MINS: Performed by: NURSE PRACTITIONER

## 2023-12-19 PROCEDURE — 6360000002 HC RX W HCPCS: Performed by: SURGERY

## 2023-12-19 PROCEDURE — P9041 ALBUMIN (HUMAN),5%, 50ML: HCPCS | Performed by: STUDENT IN AN ORGANIZED HEALTH CARE EDUCATION/TRAINING PROGRAM

## 2023-12-19 PROCEDURE — 2500000003 HC RX 250 WO HCPCS: Performed by: NURSE PRACTITIONER

## 2023-12-19 PROCEDURE — 85027 COMPLETE CBC AUTOMATED: CPT

## 2023-12-19 PROCEDURE — 94003 VENT MGMT INPAT SUBQ DAY: CPT

## 2023-12-19 PROCEDURE — 82803 BLOOD GASES ANY COMBINATION: CPT

## 2023-12-19 PROCEDURE — 2580000003 HC RX 258: Performed by: SURGERY

## 2023-12-19 PROCEDURE — 36430 TRANSFUSION BLD/BLD COMPNT: CPT

## 2023-12-19 PROCEDURE — P9016 RBC LEUKOCYTES REDUCED: HCPCS

## 2023-12-19 PROCEDURE — 6370000000 HC RX 637 (ALT 250 FOR IP): Performed by: STUDENT IN AN ORGANIZED HEALTH CARE EDUCATION/TRAINING PROGRAM

## 2023-12-19 PROCEDURE — 36415 COLL VENOUS BLD VENIPUNCTURE: CPT

## 2023-12-19 PROCEDURE — 2000000000 HC ICU R&B

## 2023-12-19 PROCEDURE — 85018 HEMOGLOBIN: CPT

## 2023-12-19 PROCEDURE — 80048 BASIC METABOLIC PNL TOTAL CA: CPT

## 2023-12-19 PROCEDURE — 6360000002 HC RX W HCPCS: Performed by: STUDENT IN AN ORGANIZED HEALTH CARE EDUCATION/TRAINING PROGRAM

## 2023-12-19 PROCEDURE — 85014 HEMATOCRIT: CPT

## 2023-12-19 RX ORDER — SODIUM CHLORIDE 9 MG/ML
INJECTION, SOLUTION INTRAVENOUS PRN
Status: DISCONTINUED | OUTPATIENT
Start: 2023-12-19 | End: 2023-12-19

## 2023-12-19 RX ORDER — ALBUMIN, HUMAN INJ 5% 5 %
50 SOLUTION INTRAVENOUS ONCE
Status: COMPLETED | OUTPATIENT
Start: 2023-12-19 | End: 2023-12-19

## 2023-12-19 RX ORDER — ACETAMINOPHEN 650 MG/1
650 SUPPOSITORY RECTAL EVERY 6 HOURS
Status: DISCONTINUED | OUTPATIENT
Start: 2023-12-19 | End: 2023-12-27

## 2023-12-19 RX ADMIN — SODIUM CHLORIDE, POTASSIUM CHLORIDE, SODIUM LACTATE AND CALCIUM CHLORIDE: 600; 310; 30; 20 INJECTION, SOLUTION INTRAVENOUS at 04:15

## 2023-12-19 RX ADMIN — ACETAMINOPHEN 650 MG: 650 SUPPOSITORY RECTAL at 11:25

## 2023-12-19 RX ADMIN — VANCOMYCIN HYDROCHLORIDE 1500 MG: 1.5 INJECTION, POWDER, LYOPHILIZED, FOR SOLUTION INTRAVENOUS at 13:51

## 2023-12-19 RX ADMIN — VANCOMYCIN HYDROCHLORIDE 1500 MG: 1.5 INJECTION, POWDER, LYOPHILIZED, FOR SOLUTION INTRAVENOUS at 22:06

## 2023-12-19 RX ADMIN — PROPOFOL 40 MCG/KG/MIN: 10 INJECTION, EMULSION INTRAVENOUS at 06:25

## 2023-12-19 RX ADMIN — HYDROCORTISONE SODIUM SUCCINATE 100 MG: 100 INJECTION, POWDER, FOR SOLUTION INTRAMUSCULAR; INTRAVENOUS at 05:32

## 2023-12-19 RX ADMIN — FLUCONAZOLE 400 MG: 2 INJECTION, SOLUTION INTRAVENOUS at 22:19

## 2023-12-19 RX ADMIN — ALBUMIN (HUMAN) 50 G: 12.5 INJECTION, SOLUTION INTRAVENOUS at 04:22

## 2023-12-19 RX ADMIN — METRONIDAZOLE 500 MG: 500 INJECTION, SOLUTION INTRAVENOUS at 18:51

## 2023-12-19 RX ADMIN — ACETAMINOPHEN 650 MG: 650 SUPPOSITORY RECTAL at 17:25

## 2023-12-19 RX ADMIN — METRONIDAZOLE 500 MG: 500 INJECTION, SOLUTION INTRAVENOUS at 02:24

## 2023-12-19 RX ADMIN — METRONIDAZOLE 500 MG: 500 INJECTION, SOLUTION INTRAVENOUS at 10:57

## 2023-12-19 RX ADMIN — CEFEPIME 2000 MG: 2 INJECTION, POWDER, FOR SOLUTION INTRAVENOUS at 21:55

## 2023-12-19 RX ADMIN — CEFEPIME 2000 MG: 2 INJECTION, POWDER, FOR SOLUTION INTRAVENOUS at 05:32

## 2023-12-19 RX ADMIN — HYDROMORPHONE HYDROCHLORIDE 1 MG: 1 INJECTION, SOLUTION INTRAMUSCULAR; INTRAVENOUS; SUBCUTANEOUS at 10:53

## 2023-12-19 RX ADMIN — VANCOMYCIN HYDROCHLORIDE 1250 MG: 1.25 INJECTION, POWDER, LYOPHILIZED, FOR SOLUTION INTRAVENOUS at 08:16

## 2023-12-19 RX ADMIN — ACETAMINOPHEN 650 MG: 650 SUPPOSITORY RECTAL at 04:46

## 2023-12-19 RX ADMIN — ACETAMINOPHEN 650 MG: 650 SUPPOSITORY RECTAL at 22:55

## 2023-12-19 RX ADMIN — HYDROMORPHONE HYDROCHLORIDE 1 MG: 1 INJECTION, SOLUTION INTRAMUSCULAR; INTRAVENOUS; SUBCUTANEOUS at 19:00

## 2023-12-19 RX ADMIN — HYDROMORPHONE HYDROCHLORIDE 1 MG: 1 INJECTION, SOLUTION INTRAMUSCULAR; INTRAVENOUS; SUBCUTANEOUS at 23:05

## 2023-12-19 RX ADMIN — CEFEPIME 2000 MG: 2 INJECTION, POWDER, FOR SOLUTION INTRAVENOUS at 13:56

## 2023-12-19 RX ADMIN — PROPOFOL 45 MCG/KG/MIN: 10 INJECTION, EMULSION INTRAVENOUS at 02:38

## 2023-12-19 RX ADMIN — SODIUM CHLORIDE, POTASSIUM CHLORIDE, SODIUM LACTATE AND CALCIUM CHLORIDE: 600; 310; 30; 20 INJECTION, SOLUTION INTRAVENOUS at 17:25

## 2023-12-19 RX ADMIN — HYDROMORPHONE HYDROCHLORIDE 1 MG: 1 INJECTION, SOLUTION INTRAMUSCULAR; INTRAVENOUS; SUBCUTANEOUS at 15:01

## 2023-12-19 ASSESSMENT — PAIN DESCRIPTION - DESCRIPTORS
DESCRIPTORS: ACHING
DESCRIPTORS: SHOOTING;ACHING;CRAMPING
DESCRIPTORS: ACHING
DESCRIPTORS: ACHING

## 2023-12-19 ASSESSMENT — PAIN SCALES - GENERAL
PAINLEVEL_OUTOF10: 0
PAINLEVEL_OUTOF10: 7
PAINLEVEL_OUTOF10: 0
PAINLEVEL_OUTOF10: 7
PAINLEVEL_OUTOF10: 0
PAINLEVEL_OUTOF10: 8
PAINLEVEL_OUTOF10: 7
PAINLEVEL_OUTOF10: 0

## 2023-12-19 ASSESSMENT — PULMONARY FUNCTION TESTS
PIF_VALUE: 28
PIF_VALUE: 20
PIF_VALUE: 27

## 2023-12-19 ASSESSMENT — PAIN DESCRIPTION - LOCATION
LOCATION: ABDOMEN

## 2023-12-19 ASSESSMENT — PAIN DESCRIPTION - ORIENTATION
ORIENTATION: RIGHT;LEFT;MID
ORIENTATION: RIGHT;LEFT
ORIENTATION: ANTERIOR

## 2023-12-19 NOTE — PROGRESS NOTES
attended Interdisciplinary rounds (IDT) in ICU with medical/care team members where this patient's ongoing care was discussed. Please contact [facility] for any referrals. Thank you.      Chaplain Elizabeth Snell M.Div., [facility].   [phone]

## 2023-12-19 NOTE — CONSULTS
S[employer] Tele  CRITICAL CARE    Tele ICU TEAM Progress Note    Name: Pily Howell   : 1970   MRN: [id number]   Date: 2023           ICU Assessment   Acute problems  Acute resp failure  Septic shock 2/2 feculant peritonitis  Enterococcal bacteremia  anemia            ICU Comprehensive Plan of Care:    NEURO: vent/sedation bundle  CV: Keep MAP >65; cont current vasopressors, albumin bolus  PULM: Continue mechanical ventilation, low TV, high peep strategy, serial ABG  GI: npo  RENAL/: Monitor Cr and UOP; trend bmp  ENDO: BG Goal 140-180; glycemic control, stress dose steroids  HEME/ONC: Tx Hgb < 7, Plt<10k; transfuse as needed  MICRO:  FU on Cx; cont broad abx coverage including antifungal  Code Status: Full Code  LOS: 3  Prophylaxis: GI: Pepcid  DVT: scd     Discussed Care Plan with Bedside RN       Subjective:   Progress Note: 2023      Reason for ICU Admission: acute resp failue, sepic hsock    HPI:     53 marcel old woman transferred to ICU post op after anastomotic leak and feculant peritonitis. Transferred form OR to ICU intubated and on levoiphed      Active Problem List:     Hospital Problems             Last Modified POA    * (Principal) Abscess 2023 Yes        Past Medical History:      has a past medical history of Contact dermatitis and eczema due to cause and Muscle pain.    Past Surgical History:      has a past surgical history that includes laparotomy (N/A, 12/10/2023).    Home Medications:     Prior to Admission medications    Medication Sig Start Date End Date Taking? Authorizing Provider   metroNIDAZOLE (FLAGYL) 500 MG tablet Take 1 tablet by mouth every 8 hours for 7 days  Patient not taking: Reported on 2023  Sheron Robert, APRN - CNP   levoFLOXacin (LEVAQUIN) 750 MG tablet Take 1 tablet by mouth daily for 7 days  Patient not taking: Reported on 2023  Sheron Robert APRN - CNP   ondansetron (ZOFRAN-ODT) 4 MG  HEENT: PERRL  NC, AT  Neck: no JVD, midline trach  Mouth: oral ETT   CV: tachycardic  Pulm: symmetric chest rise  Abd: dressing in place  LABS AND  DATA: Personally reviewed  Recent Labs     12/18/23  1817 12/19/23  0123   WBC 30.0* 19.0*   HGB 8.0* 7.1*   HCT 25.2* 21.7*   * 251     Recent Labs     12/18/23  0343 12/19/23  0123    140   K 3.7 4.4    110*   CO2 29 18*   BUN 9 11     Recent Labs     12/16/23  1501   GLOB 4.7*     No results for input(s): \"INR\", \"APTT\" in the last 72 hours. Invalid input(s): \"PTP\"   Invalid input(s): \"PHI\", \"PCO2I\", \"PO2I\", \"FIO2I\"  No results for input(s): \"CPK\", \"CKMB\" in the last 72 hours. Invalid input(s): \"TROIQ\", \"BNPP\"    Hemodynamics:   PAP:   CO:     [place]: Discharge Planning  Living Arrangements: Spouse/Significant Other;Children (13 and 25year old sons) (12/18/23 1432)  Support Systems: Spouse/Significant Other (12/18/23 1432)  Type of Residence: House (12/18/23 1432)  Potential Assistance Needed: N/A (12/18/23 1432)  Patient expects to be discharged to[de-identified] House (12/18/23 1432) CI:     CVP:    SVR:       PVR:       Ventilator Settings:  Mode Rate Tidal Volume Pressure FiO2 PEEP                    Peak airway pressure:      Minute ventilation:          MEDS: Reviewed       I performed all aspects of the physical examination via Telemedicine associated with two way audio and video communication and with the on-site assistance of Nurse. Patient is critically ill in the ICU. I  personally  reviewed the pertinent medical records, laboratory/ pathology data and radiographic images. Due to  critical illness impairing one or more vital organs of this patient resulting in life threatening clinical situation  I have provided direct, frequent personal  assessment and manipulation in management plan and spent 35  minutes  of  critical care time excluding the time spent on procedures and teaching.   Greater than 50% of this time  in patient's care was

## 2023-12-19 NOTE — PROGRESS NOTES
Physician Progress Note      PATIENT:               Joe Silva  CSN #:                  [id number]  :                       1970  ADMIT DATE:       2023 2:48 PM  [address] [facility] DATE:  RESPONDING  PROVIDER #:        Renita Hong NP          QUERY TEXT:    Good afternoon. Pt admitted with feculent peritonitis and underwent open sigmoidectomy on   12/10/23, noted documentation of septic shock in  Critical Care consult. If possible, please document in progress notes and discharge summary the   relationship if any between the feculent peritonitis and sepsis with septic   shock and the surgery: The medical record reflects the following:    Risk Factors: 47 yr old female, s/p sigmoidectomy on 10/09/23, contact   dermatitis, eczema    Clinical Indicators: from  Critical care consult: \"Acute problems 1. Acute resp failure 2. Septic shock 2/2 feculant peritonitis 3. Enterococcal   bacteremia 4. anemia\" and \"54 marcel old woman transferred to ICU post op after   anastomotic leak and feculant peritonitis.  Transferred form OR to ICU   intubated and on levoiphed\"; from  Brief Op Note: \"anastomotic leak with   fecal peritonitis\"; WBC: 17.9, 20.7, 17.0, 30.0, 19.0, 16.8; poc Lactic Acid:   2.45, 1.36;  Blood c: Enterococcus faecium;  VS: 07:01--;    VS 22:31-- temp 103, , /87 RR 32;  VS: 23:46--,   BP 88/56;  VS: 00:00--, BP 88/56, RR 25;    Treatment: OR on  for exploratory laparotomy, partial colectomy,   omenectomy, abdominal washout, creation of ostomy, labs, blood cx, ICU level   care, IV Cefepime, Levophed gtt, IV Flagyl, IV Vancomycin, IV Diflucan, IV   Albumin, IV Fluids      Thank you,  Fred Lubin RN, CDI  Options provided:  -- Feculent peritonitis and sepsis with septic shock is due to the procedure  -- Feculent peritonitis and sepsis with septic shock is not due to the   procedure, but is due to other incidental risk

## 2023-12-19 NOTE — PROGRESS NOTES
Physical Therapy orders acknowledged, chart reviewed and discussed with nurse recently extubated few hours ago and asked to check later. We will continue to follow up with the patient for therapy.

## 2023-12-19 NOTE — PLAN OF CARE
Problem: Safety - Medical Restraint  Goal: Remains free of injury from restraints (Restraint for Interference with Medical Device)  Description: INTERVENTIONS:  1. Determine that other, less restrictive measures have been tried or would not be effective before applying the restraint  2. Evaluate the patient's condition at the time of restraint application  3. Inform patient/family regarding the reason for restraint  4.  Q2H: Monitor safety, psychosocial status, comfort, nutrition and hydration  Outcome: Progressing  Flowsheets (Taken 12/18/2023 2241)  Remains free of injury from restraints (restraint for interference with medical device): Every 2 hours: Monitor safety, psychosocial status, comfort, nutrition and hydration

## 2023-12-19 NOTE — ED NOTES
Verbal report given to SHEREEN Hernandez(name) on [facility] being transferred to floor(unit) for routine progression of patient care    Report consisted of patient's Situation, Background, Assessment and Recommendations (SBAR)    Information from the following report(s)  Nurse Handoff Report, ED SBAR, MAR, and Recent Results was reviewed with the receiving nurse. Opportunity for questions and clarification was provided.     Patient transported with:  Monitor    Last Filed Values:  Vitals:    12/19/23 0200   BP:    Pulse: (!) 102   Resp: 20   Temp:    SpO2: 99%          WBC   Date Value Ref Range Status   12/19/2023 19.0 (H) 3.6 - 11.0 K/uL Final   12/18/2023 30.0 (H) 3.6 - 11.0 K/uL Final   12/18/2023 17.0 (H) 3.6 - 11.0 K/uL Final        Blood Cultures Drawn:  Yes    Initial Lactic Acid (LA):  Time 2219,  Result 2.45    Repeat LA:  Time Due 0450, Done & Result 1.36    Fluid Restriction:  Total needed 1000 ml, Status completed, amount 1000 ml    All Antibiotics Started:  Yes, Dose Due 1634    VS x 2 post-fluid resuscitation:   Yes    Vasopressor Infusion:  No     Provider Reassessment needed and notified:  Yes, Due N/A    Additional Interventions/Comments:  N/A      Maria Guadalupe Mcbride RN  12/19/23 1822

## 2023-12-19 NOTE — CONSENT
Informed Consent for Blood Component Transfusion Note    I have discussed with the patient the rationale for blood component transfusion; its benefits in treating or preventing fatigue, organ damage, or death; and its risk which includes mild transfusion reactions, rare risk of blood borne infection, or more serious but rare reactions. I have discussed the alternatives to transfusion, including the risk and consequences of not receiving transfusion. The patient had an opportunity to ask questions and had agreed to proceed with transfusion of blood components.     Electronically signed by General Serina MD on 12/18/23 at 9:46 PM EST

## 2023-12-19 NOTE — CARE COORDINATION
12/19/23 1611   Service Assessment   Patient Orientation Other (see comment)  (extubated after IDR)   Cognition Other (see comment)   History Provided By Medical Record   Primary Caregiver Self   Support Systems Spouse/Significant Other   Patient's Healthcare Decision Maker is: Legal Next of Kin  (=Richar Howell @ [phone])   PCP Verified by CM Yes  (Dr Marivel Sky)   Last Visit to PCP Within last 6 months   Prior Functional Level Independent in ADLs/IADLs   Current Functional Level Assistance with the following:;Mobility;Bathing;Dressing   Can patient return to prior living arrangement Yes   Ability to make needs known: Good   Family able to assist with home care needs: Yes   Would you like for me to discuss the discharge plan with any other family members/significant others, and if so, who? Yes   Community Resources None   CM/SW Referral Disease Management Education   Social/Functional History   Lives With Spouse;Son   Type of Home House   Home Layout Two level;Able to Live on Main level with bedroom/bathroom   Home Access Stairs to enter with rails   Entrance Stairs - Number of Steps 13   Entrance Stairs - Rails Both   Bathroom Shower/Tub Walk-in shower   Bathroom Toilet Standard   Home Equipment Crutches;Walker, rolling   ADL Assistance Independent   Homemaking Assistance Independent   Ambulation Assistance Independent   Transfer Assistance Independent   Active  Yes   Type of Occupation works for [employer]   Discharge Planning   Type of Residence House   Living Arrangements Spouse/Significant Other;Children   Current Services Prior To Admission None   Potential Assistance Needed N/A   DME Ordered? No   Potential Assistance Purchasing Medications No   Type of Home Care Services None   Patient expects to be discharged to: House     Pt is a readmission.  Pt was recently hospitalized on 12/18/23.  Pt was initially hospitalized for perforated viscus,pneumoperitoneum.acute  intestinal ischemia and acute diverticuliis,    Pt was readmitted due to acute abdominal pain and no BM x1 week,  Pt had an abdominal washout,omnectomy and partial colectomy with ostomy creation & drain placement. Goal today was accomplished:extubation.     Ronit Dilling

## 2023-12-19 NOTE — WOUND CARE
Wound consult: new patient consult for new ostomy. Post op day # 1 EXPLORATORY LAPAROTOMY, PARTIAL COLECTOMY, OMENECTOMY, ABDOMINAL WASHOUT, CREATION OF OSTOMY. Met with patient and her  Timbo Hanley at bedside to introduce self, role of ostomy nurse in colostomy education and care. Patient medicated with IV pain med, alert, oriented x 4. Timbo Hanley has been looking up and reading about colostomy care since surgery and wishes to be active participant in learning care with [facility]. Patient's nurse Franci Dougherty hands off no skin issues with pressure areas. Assessment:  Has bulky abdominal dressing, JOHN drain. Serosanguinous drainage from JOHN. LUQ colostomy - stoma pink to dusky pink, small amount of serosanguinous output; no leaking, appears to be in crease. Plan: Will begin working with patient and family; deferring written information packet due being in ICU.   Yousuf Sims RN, [employer]

## 2023-12-19 NOTE — PLAN OF CARE
Problem: Safety - Medical Restraint  Goal: Remains free of injury from restraints (Restraint for Interference with Medical Device)  Description: INTERVENTIONS:  1. Determine that other, less restrictive measures have been tried or would not be effective before applying the restraint  2. Evaluate the patient's condition at the time of restraint application  3. Inform patient/family regarding the reason for restraint  4.  Q2H: Monitor safety, psychosocial status, comfort, nutrition and hydration  12/19/2023 1211 by Duy Gomez RN  Outcome: Completed  Flowsheets  Taken 12/19/2023 1024 by Lissette Perez RN  Remains free of injury from restraints (restraint for interference with medical device):   Every 2 hours: Monitor safety, psychosocial status, comfort, nutrition and hydration   Determine that other, less restrictive measures have been tried or would not be effective before applying the restraint  Taken 12/19/2023 1000 by Lissette Perez RN  Remains free of injury from restraints (restraint for interference with medical device):   Every 2 hours: Monitor safety, psychosocial status, comfort, nutrition and hydration   Evaluate the patient's condition at the time of restraint application  Taken 31/95/4680 0800 by Lissette Perez RN  Remains free of injury from restraints (restraint for interference with medical device):   Evaluate the patient's condition at the time of restraint application   Every 2 hours: Monitor safety, psychosocial status, comfort, nutrition and hydration  12/19/2023 0014 by Gisel Bob RN  Outcome: Progressing  Flowsheets (Taken 12/18/2023 2242)  Remains free of injury from restraints (restraint for interference with medical device): Every 2 hours: Monitor safety, psychosocial status, comfort, nutrition and hydration

## 2023-12-19 NOTE — PROGRESS NOTES
Occupational Therapy Note:  Chart reviewed and spoke with nursing. Patient extubated this AM and receiving PRBCs. Will hold OT evaluation at this time.     Ritesh Roth, OTR/L

## 2023-12-19 NOTE — PROGRESS NOTES
Patient extubated to 4lpm NC. Patient tolerated extubation well, left resting with family.      12/19/23 1024   B: Both Spontaneous Awakening and Breathing Trials   Did Patient Receive Sedative and/or Opioid IV Medications in the Last 24 Hours No   Safety Screening Spontaneous Awakening Trial (SAT) Proceed with SAT - no exclusion criteria met   Spontaneous Awakening Trial (SAT) Outcome SAT passed   RT Notified Ready for SBT Yes   Was Patient Receiving Mechanical Ventilation Yes   Safety Screening Spontaneous Breathing Trial (SBT) Proceed with SBT - no exclusion criteria met   Spontaneous  mL   Spontaneous Breathing Trial (SBT) Outcome SBT Passed   Patient Meet Extubation Criteria Yes   Provider Ordered Extubation Yes   Weaning Parameters   Spontaneous Breathing Trial Complete Yes   Respiratory Rate Observed 20   Ve 7.97      RSBI 37

## 2023-12-19 NOTE — BRIEF OP NOTE
Brief Postoperative Note      Patient: Wilma Ding  YOB: 1970  MRN: [id number]    Date of Procedure: 12/18/2023    Pre-Op Diagnosis Codes:     * Intra-abdominal infection [B99.9]    Post-Op Diagnosis:  anastomotic leak with fecal peritonitis       Procedure(s):  EXPLORATORY LAPAROTOMY, PARTIAL COLECTOMY, OMENECTOMY, ABDOMINAL WASHOUT, CREATION OF OSTOMY    Surgeon(s):  MD Milan James MD    Assistant:  Surgical Assistant: Radu Wood [address] [place] [address]     Anesthesia: General    Estimated Blood Loss (mL): 539    Complications: None    Specimens:   ID Type Source Tests Collected by Time Destination   1 : LEFT FALLOPIAN TUBE AND OVARY Tissue Fallopian Tube SURGICAL PATHOLOGY Dameon Lopez MD 12/18/2023 1819    2 : colon Tissue Colon SURGICAL PATHOLOGY Dameon Lopez MD 12/18/2023 1845        Implants:  * No implants in log *      Drains:   Closed/Suction Drain Right Abdomen Bulb (Active)       Closed/Suction Drain Left Abdomen Bulb (Active)       NG/OG/NJ/NE Tube Nasogastric Left nostril (Active)       Colostomy LLQ (Active)       Urinary Catheter 12/18/23 2 Way (Active)   $ Urethral catheter insertion Inserted for procedure 12/18/23 1656   Catheter Indications Perioperative use for selected surgical procedures 12/18/23 1656   Site Assessment Pink 12/18/23 1656   Collection Container Standard 12/18/23 1656   Catheter Care  Perineal wipes 12/18/23 1656   Catheter Best Practices  Drainage tube clipped to bed;Catheter secured to thigh; Tamper seal intact; Bag below bladder;Bag not on floor; Lack of dependent loop in tubing;Drainage bag less than half full 12/18/23 1656   Status Draining;Patent 12/18/23 1656   Output (mL) 120 mL 12/18/23 1656   Discontinuation Reason Per provider order 12/18/23 1656       [REMOVED] Closed/Suction Drain Midline Bulb (Removed)   Site Description Clean, dry & intact 12/14/23 1202   Dressing Status Clean, dry & intact

## 2023-12-20 ENCOUNTER — APPOINTMENT (OUTPATIENT)
Facility: HOSPITAL | Age: 53
DRG: 856 | End: 2023-12-20
Attending: INTERNAL MEDICINE
Payer: COMMERCIAL

## 2023-12-20 PROBLEM — R78.81 ENTEROCOCCAL BACTEREMIA: Status: ACTIVE | Noted: 2023-12-20

## 2023-12-20 PROBLEM — Z88.0 PENICILLIN ALLERGY: Status: ACTIVE | Noted: 2023-12-20

## 2023-12-20 PROBLEM — T81.43XA POSTOPERATIVE INTRA-ABDOMINAL ABSCESS: Status: ACTIVE | Noted: 2023-12-20

## 2023-12-20 PROBLEM — R78.81 BACTEREMIA: Status: ACTIVE | Noted: 2023-12-20

## 2023-12-20 PROBLEM — K65.1 POSTOPERATIVE INTRA-ABDOMINAL ABSCESS (HCC): Status: ACTIVE | Noted: 2023-12-20

## 2023-12-20 PROBLEM — G40.909 SEIZURE DISORDER (HCC): Status: ACTIVE | Noted: 2023-12-20

## 2023-12-20 PROBLEM — B95.2 ENTEROCOCCAL BACTEREMIA: Status: ACTIVE | Noted: 2023-12-20

## 2023-12-20 LAB
ANION GAP SERPL CALC-SCNC: 5 MMOL/L (ref 5–15)
BASOPHILS # BLD: 0 K/UL (ref 0–0.1)
BASOPHILS NFR BLD: 0 % (ref 0–1)
BUN SERPL-MCNC: 10 MG/DL (ref 6–20)
BUN/CREAT SERPL: 37 (ref 12–20)
CALCIUM SERPL-MCNC: 7.3 MG/DL (ref 8.5–10.1)
CHLORIDE SERPL-SCNC: 111 MMOL/L (ref 97–108)
CO2 SERPL-SCNC: 28 MMOL/L (ref 21–32)
CREAT SERPL-MCNC: 0.27 MG/DL (ref 0.55–1.02)
DIFFERENTIAL METHOD BLD: ABNORMAL
ECHO AO ASC DIAM: 3.4 CM
ECHO AO ASCENDING AORTA INDEX: 1.55 CM/M2
ECHO AV AREA PEAK VELOCITY: 3.2 CM2
ECHO AV AREA VTI: 3.2 CM2
ECHO AV AREA/BSA PEAK VELOCITY: 1.5 CM2/M2
ECHO AV AREA/BSA VTI: 1.5 CM2/M2
ECHO AV MEAN GRADIENT: 5 MMHG
ECHO AV MEAN VELOCITY: 1.1 M/S
ECHO AV PEAK GRADIENT: 11 MMHG
ECHO AV PEAK VELOCITY: 1.6 M/S
ECHO AV VELOCITY RATIO: 0.75
ECHO AV VTI: 31.3 CM
ECHO BSA: 2.31 M2
ECHO LA DIAMETER INDEX: 1.91 CM/M2
ECHO LA DIAMETER: 4.2 CM
ECHO LA VOL A-L A2C: 52 ML (ref 22–52)
ECHO LA VOL A-L A4C: 52 ML (ref 22–52)
ECHO LA VOL BP: 53 ML (ref 22–52)
ECHO LA VOL MOD A2C: 52 ML (ref 22–52)
ECHO LA VOL MOD A4C: 51 ML (ref 22–52)
ECHO LA VOL/BSA BIPLANE: 24 ML/M2 (ref 16–34)
ECHO LA VOLUME AREA LENGTH: 54 ML
ECHO LA VOLUME INDEX A-L A2C: 24 ML/M2 (ref 16–34)
ECHO LA VOLUME INDEX A-L A4C: 24 ML/M2 (ref 16–34)
ECHO LA VOLUME INDEX AREA LENGTH: 25 ML/M2 (ref 16–34)
ECHO LA VOLUME INDEX MOD A2C: 24 ML/M2 (ref 16–34)
ECHO LA VOLUME INDEX MOD A4C: 23 ML/M2 (ref 16–34)
ECHO LV E' LATERAL VELOCITY: 16 CM/S
ECHO LV E' SEPTAL VELOCITY: 14 CM/S
ECHO LV EDV A2C: 95 ML
ECHO LV EDV A4C: 85 ML
ECHO LV EDV BP: 92 ML (ref 56–104)
ECHO LV EDV INDEX A4C: 39 ML/M2
ECHO LV EDV INDEX BP: 42 ML/M2
ECHO LV EDV NDEX A2C: 43 ML/M2
ECHO LV EJECTION FRACTION A2C: 59 %
ECHO LV EJECTION FRACTION A4C: 55 %
ECHO LV EJECTION FRACTION BIPLANE: 58 % (ref 55–100)
ECHO LV ESV A2C: 39 ML
ECHO LV ESV A4C: 38 ML
ECHO LV ESV BP: 39 ML (ref 19–49)
ECHO LV ESV INDEX A2C: 18 ML/M2
ECHO LV ESV INDEX A4C: 17 ML/M2
ECHO LV ESV INDEX BP: 18 ML/M2
ECHO LV FRACTIONAL SHORTENING: 29 % (ref 28–44)
ECHO LV INTERNAL DIMENSION DIASTOLE INDEX: 2.23 CM/M2
ECHO LV INTERNAL DIMENSION DIASTOLIC: 4.9 CM (ref 3.9–5.3)
ECHO LV INTERNAL DIMENSION SYSTOLIC INDEX: 1.59 CM/M2
ECHO LV INTERNAL DIMENSION SYSTOLIC: 3.5 CM
ECHO LV IVSD: 1.2 CM (ref 0.6–0.9)
ECHO LV MASS 2D: 213.3 G (ref 67–162)
ECHO LV MASS INDEX 2D: 96.9 G/M2 (ref 43–95)
ECHO LV POSTERIOR WALL DIASTOLIC: 1.1 CM (ref 0.6–0.9)
ECHO LV RELATIVE WALL THICKNESS RATIO: 0.45
ECHO LVOT AREA: 4.2 CM2
ECHO LVOT AV VTI INDEX: 0.75
ECHO LVOT DIAM: 2.3 CM
ECHO LVOT MEAN GRADIENT: 3 MMHG
ECHO LVOT PEAK GRADIENT: 6 MMHG
ECHO LVOT PEAK VELOCITY: 1.2 M/S
ECHO LVOT STROKE VOLUME INDEX: 44.5 ML/M2
ECHO LVOT SV: 98 ML
ECHO LVOT VTI: 23.6 CM
ECHO MV A VELOCITY: 0.8 M/S
ECHO MV E DECELERATION TIME (DT): 142.5 MS
ECHO MV E VELOCITY: 0.94 M/S
ECHO MV E/A RATIO: 1.18
ECHO MV E/E' LATERAL: 5.88
ECHO MV E/E' RATIO (AVERAGED): 6.29
ECHO PV MAX VELOCITY: 1.2 M/S
ECHO PV PEAK GRADIENT: 6 MMHG
ECHO RV INTERNAL DIMENSION: 4.1 CM
ECHO RV TAPSE: 2.2 CM (ref 1.7–?)
ECHO RVOT PEAK GRADIENT: 3 MMHG
ECHO RVOT PEAK VELOCITY: 0.9 M/S
EOSINOPHIL # BLD: 0 K/UL (ref 0–0.4)
EOSINOPHIL NFR BLD: 0 % (ref 0–7)
ERYTHROCYTE [DISTWIDTH] IN BLOOD BY AUTOMATED COUNT: 15.6 % (ref 11.5–14.5)
GLUCOSE SERPL-MCNC: 105 MG/DL (ref 65–100)
HCT VFR BLD AUTO: 19.1 % (ref 35–47)
HCT VFR BLD AUTO: 24 % (ref 35–47)
HGB BLD-MCNC: 6.3 G/DL (ref 11.5–16)
HGB BLD-MCNC: 8 G/DL (ref 11.5–16)
HISTORY CHECK: NORMAL
IMM GRANULOCYTES # BLD AUTO: 0.2 K/UL (ref 0–0.04)
IMM GRANULOCYTES NFR BLD AUTO: 1 % (ref 0–0.5)
LYMPHOCYTES # BLD: 1 K/UL (ref 0.8–3.5)
LYMPHOCYTES NFR BLD: 7 % (ref 12–49)
MAGNESIUM SERPL-MCNC: 2.3 MG/DL (ref 1.6–2.4)
MCH RBC QN AUTO: 27.4 PG (ref 26–34)
MCHC RBC AUTO-ENTMCNC: 33 G/DL (ref 30–36.5)
MCV RBC AUTO: 83 FL (ref 80–99)
MONOCYTES # BLD: 0.8 K/UL (ref 0–1)
MONOCYTES NFR BLD: 5 % (ref 5–13)
NEUTS SEG # BLD: 12.8 K/UL (ref 1.8–8)
NEUTS SEG NFR BLD: 87 % (ref 32–75)
NRBC # BLD: 0 K/UL (ref 0–0.01)
NRBC BLD-RTO: 0 PER 100 WBC
PHOSPHATE SERPL-MCNC: 1.2 MG/DL (ref 2.6–4.7)
PLATELET # BLD AUTO: 248 K/UL (ref 150–400)
PMV BLD AUTO: 9.6 FL (ref 8.9–12.9)
POTASSIUM SERPL-SCNC: 3.3 MMOL/L (ref 3.5–5.1)
RBC # BLD AUTO: 2.3 M/UL (ref 3.8–5.2)
SODIUM SERPL-SCNC: 144 MMOL/L (ref 136–145)
VANCOMYCIN SERPL-MCNC: 14.7 UG/ML
WBC # BLD AUTO: 14.7 K/UL (ref 3.6–11)

## 2023-12-20 PROCEDURE — P9016 RBC LEUKOCYTES REDUCED: HCPCS

## 2023-12-20 PROCEDURE — 6360000002 HC RX W HCPCS: Performed by: NURSE PRACTITIONER

## 2023-12-20 PROCEDURE — 2580000003 HC RX 258: Performed by: SURGERY

## 2023-12-20 PROCEDURE — 6360000002 HC RX W HCPCS: Performed by: SURGERY

## 2023-12-20 PROCEDURE — 85014 HEMATOCRIT: CPT

## 2023-12-20 PROCEDURE — APPNB30 APP NON BILLABLE TIME 0-30 MINS: Performed by: NURSE PRACTITIONER

## 2023-12-20 PROCEDURE — 2500000003 HC RX 250 WO HCPCS: Performed by: NURSE PRACTITIONER

## 2023-12-20 PROCEDURE — 80048 BASIC METABOLIC PNL TOTAL CA: CPT

## 2023-12-20 PROCEDURE — 99223 1ST HOSP IP/OBS HIGH 75: CPT | Performed by: INTERNAL MEDICINE

## 2023-12-20 PROCEDURE — 85025 COMPLETE CBC W/AUTO DIFF WBC: CPT

## 2023-12-20 PROCEDURE — 2500000003 HC RX 250 WO HCPCS: Performed by: SURGERY

## 2023-12-20 PROCEDURE — 97530 THERAPEUTIC ACTIVITIES: CPT

## 2023-12-20 PROCEDURE — 83735 ASSAY OF MAGNESIUM: CPT

## 2023-12-20 PROCEDURE — 2580000003 HC RX 258: Performed by: NURSE PRACTITIONER

## 2023-12-20 PROCEDURE — 6370000000 HC RX 637 (ALT 250 FOR IP): Performed by: STUDENT IN AN ORGANIZED HEALTH CARE EDUCATION/TRAINING PROGRAM

## 2023-12-20 PROCEDURE — 84100 ASSAY OF PHOSPHORUS: CPT

## 2023-12-20 PROCEDURE — 93306 TTE W/DOPPLER COMPLETE: CPT

## 2023-12-20 PROCEDURE — 85018 HEMOGLOBIN: CPT

## 2023-12-20 PROCEDURE — C9113 INJ PANTOPRAZOLE SODIUM, VIA: HCPCS | Performed by: NURSE PRACTITIONER

## 2023-12-20 PROCEDURE — 80202 ASSAY OF VANCOMYCIN: CPT

## 2023-12-20 PROCEDURE — A4216 STERILE WATER/SALINE, 10 ML: HCPCS | Performed by: NURSE PRACTITIONER

## 2023-12-20 PROCEDURE — 97116 GAIT TRAINING THERAPY: CPT

## 2023-12-20 PROCEDURE — 97161 PT EVAL LOW COMPLEX 20 MIN: CPT

## 2023-12-20 PROCEDURE — 36430 TRANSFUSION BLD/BLD COMPNT: CPT

## 2023-12-20 PROCEDURE — 97165 OT EVAL LOW COMPLEX 30 MIN: CPT

## 2023-12-20 PROCEDURE — 1100000000 HC RM PRIVATE

## 2023-12-20 PROCEDURE — 97535 SELF CARE MNGMENT TRAINING: CPT

## 2023-12-20 PROCEDURE — 93306 TTE W/DOPPLER COMPLETE: CPT | Performed by: STUDENT IN AN ORGANIZED HEALTH CARE EDUCATION/TRAINING PROGRAM

## 2023-12-20 PROCEDURE — 36415 COLL VENOUS BLD VENIPUNCTURE: CPT

## 2023-12-20 RX ORDER — ENOXAPARIN SODIUM 100 MG/ML
40 INJECTION SUBCUTANEOUS DAILY
Status: DISCONTINUED | OUTPATIENT
Start: 2023-12-20 | End: 2023-12-28 | Stop reason: DRUGHIGH

## 2023-12-20 RX ORDER — HYDROMORPHONE HYDROCHLORIDE 1 MG/ML
1 INJECTION, SOLUTION INTRAMUSCULAR; INTRAVENOUS; SUBCUTANEOUS
Status: DISCONTINUED | OUTPATIENT
Start: 2023-12-20 | End: 2023-12-29 | Stop reason: HOSPADM

## 2023-12-20 RX ORDER — SODIUM CHLORIDE 9 MG/ML
INJECTION, SOLUTION INTRAVENOUS PRN
Status: DISCONTINUED | OUTPATIENT
Start: 2023-12-20 | End: 2023-12-21

## 2023-12-20 RX ADMIN — CEFEPIME 2000 MG: 2 INJECTION, POWDER, FOR SOLUTION INTRAVENOUS at 13:53

## 2023-12-20 RX ADMIN — SODIUM CHLORIDE, POTASSIUM CHLORIDE, SODIUM LACTATE AND CALCIUM CHLORIDE: 600; 310; 30; 20 INJECTION, SOLUTION INTRAVENOUS at 23:23

## 2023-12-20 RX ADMIN — CEFEPIME 2000 MG: 2 INJECTION, POWDER, FOR SOLUTION INTRAVENOUS at 23:25

## 2023-12-20 RX ADMIN — PANTOPRAZOLE SODIUM 40 MG: 40 INJECTION, POWDER, FOR SOLUTION INTRAVENOUS at 11:55

## 2023-12-20 RX ADMIN — PROCHLORPERAZINE EDISYLATE 10 MG: 5 INJECTION INTRAMUSCULAR; INTRAVENOUS at 20:16

## 2023-12-20 RX ADMIN — ACETAMINOPHEN 650 MG: 650 SUPPOSITORY RECTAL at 11:33

## 2023-12-20 RX ADMIN — SODIUM CHLORIDE, POTASSIUM CHLORIDE, SODIUM LACTATE AND CALCIUM CHLORIDE: 600; 310; 30; 20 INJECTION, SOLUTION INTRAVENOUS at 12:06

## 2023-12-20 RX ADMIN — CEFEPIME 2000 MG: 2 INJECTION, POWDER, FOR SOLUTION INTRAVENOUS at 05:33

## 2023-12-20 RX ADMIN — HYDROMORPHONE HYDROCHLORIDE 1 MG: 1 INJECTION, SOLUTION INTRAMUSCULAR; INTRAVENOUS; SUBCUTANEOUS at 23:16

## 2023-12-20 RX ADMIN — HYDROMORPHONE HYDROCHLORIDE 1 MG: 1 INJECTION, SOLUTION INTRAMUSCULAR; INTRAVENOUS; SUBCUTANEOUS at 03:04

## 2023-12-20 RX ADMIN — HYDROMORPHONE HYDROCHLORIDE 1 MG: 1 INJECTION, SOLUTION INTRAMUSCULAR; INTRAVENOUS; SUBCUTANEOUS at 20:16

## 2023-12-20 RX ADMIN — ACETAMINOPHEN 650 MG: 650 SUPPOSITORY RECTAL at 04:21

## 2023-12-20 RX ADMIN — HYDROMORPHONE HYDROCHLORIDE 1 MG: 1 INJECTION, SOLUTION INTRAMUSCULAR; INTRAVENOUS; SUBCUTANEOUS at 16:09

## 2023-12-20 RX ADMIN — ENOXAPARIN SODIUM 40 MG: 100 INJECTION SUBCUTANEOUS at 11:58

## 2023-12-20 RX ADMIN — VANCOMYCIN HYDROCHLORIDE 1500 MG: 1.5 INJECTION, POWDER, LYOPHILIZED, FOR SOLUTION INTRAVENOUS at 05:35

## 2023-12-20 RX ADMIN — ACETAMINOPHEN 650 MG: 650 SUPPOSITORY RECTAL at 17:01

## 2023-12-20 RX ADMIN — HYDROMORPHONE HYDROCHLORIDE 1 MG: 1 INJECTION, SOLUTION INTRAMUSCULAR; INTRAVENOUS; SUBCUTANEOUS at 07:50

## 2023-12-20 RX ADMIN — METRONIDAZOLE 500 MG: 500 INJECTION, SOLUTION INTRAVENOUS at 11:25

## 2023-12-20 RX ADMIN — FLUCONAZOLE 400 MG: 2 INJECTION, SOLUTION INTRAVENOUS at 23:25

## 2023-12-20 RX ADMIN — METRONIDAZOLE 500 MG: 500 INJECTION, SOLUTION INTRAVENOUS at 20:55

## 2023-12-20 RX ADMIN — METRONIDAZOLE 500 MG: 500 INJECTION, SOLUTION INTRAVENOUS at 02:30

## 2023-12-20 RX ADMIN — HYDROMORPHONE HYDROCHLORIDE 1 MG: 1 INJECTION, SOLUTION INTRAMUSCULAR; INTRAVENOUS; SUBCUTANEOUS at 11:53

## 2023-12-20 RX ADMIN — POTASSIUM PHOSPHATE, MONOBASIC POTASSIUM PHOSPHATE, DIBASIC 30 MMOL: 224; 236 INJECTION, SOLUTION, CONCENTRATE INTRAVENOUS at 16:56

## 2023-12-20 RX ADMIN — SODIUM CHLORIDE, POTASSIUM CHLORIDE, SODIUM LACTATE AND CALCIUM CHLORIDE: 600; 310; 30; 20 INJECTION, SOLUTION INTRAVENOUS at 02:26

## 2023-12-20 RX ADMIN — VANCOMYCIN HYDROCHLORIDE 1500 MG: 1.5 INJECTION, POWDER, LYOPHILIZED, FOR SOLUTION INTRAVENOUS at 23:08

## 2023-12-20 RX ADMIN — VANCOMYCIN HYDROCHLORIDE 1500 MG: 1.5 INJECTION, POWDER, LYOPHILIZED, FOR SOLUTION INTRAVENOUS at 13:50

## 2023-12-20 ASSESSMENT — PAIN SCALES - GENERAL
PAINLEVEL_OUTOF10: 0
PAINLEVEL_OUTOF10: 5
PAINLEVEL_OUTOF10: 0
PAINLEVEL_OUTOF10: 8
PAINLEVEL_OUTOF10: 0
PAINLEVEL_OUTOF10: 8
PAINLEVEL_OUTOF10: 7
PAINLEVEL_OUTOF10: 5
PAINLEVEL_OUTOF10: 0
PAINLEVEL_OUTOF10: 7
PAINLEVEL_OUTOF10: 7

## 2023-12-20 ASSESSMENT — PAIN DESCRIPTION - ORIENTATION
ORIENTATION: ANTERIOR
ORIENTATION: RIGHT;LEFT
ORIENTATION: RIGHT;LEFT
ORIENTATION: LEFT;RIGHT
ORIENTATION: ANTERIOR
ORIENTATION: ANTERIOR

## 2023-12-20 ASSESSMENT — PAIN DESCRIPTION - LOCATION
LOCATION: ABDOMEN

## 2023-12-20 ASSESSMENT — PAIN DESCRIPTION - DESCRIPTORS
DESCRIPTORS: ACHING
DESCRIPTORS: SORE;TIGHTNESS
DESCRIPTORS: ACHING;TIGHTNESS;SORE
DESCRIPTORS: ACHING;SORE;TIGHTNESS
DESCRIPTORS: SORE;PRESSURE
DESCRIPTORS: SORE;PRESSURE

## 2023-12-20 ASSESSMENT — PAIN SCALES - WONG BAKER: WONGBAKER_NUMERICALRESPONSE: 0

## 2023-12-20 NOTE — OP NOTE
[address] [place] [address]  OPERATIVE REPORT    Name:  Corey Ling  MR#:  [id number]  :  1970  ACCOUNT #:  [de-identified]  DATE OF SERVICE:  2023    CLINICAL SERVICE:  General Surgery. PREOPERATIVE DIAGNOSIS:  Intra-abdominal abscess. POSTOPERATIVE DIAGNOSIS:  Anastomotic leak. PROCEDURES PERFORMED:  1. Diagnostic laparoscopy. 2.  Exploratory laparotomy. 3.  Creation of end ostomy. 4.  Partial omentectomy. SURGEON:  Flako Arita MD    ASSISTANT:  see operative record    ANESTHESIA:  General endotracheal anesthesia. COMPLICATIONS:  None. SPECIMENS REMOVED:  colon. IMPLANTS:  none. ESTIMATED BLOOD LOSS:  500 ml. DRAINS:  Two John drains. INDICATIONS:  The patient is a 60-year-old woman who presents to the emergency department postop after a sigmoid colectomy. She was having increased abdominal pain and had not yet had a bowel movement. CT imaging on admission noted some fluid collections within the pelvis. She was scheduled for exploration in the OR due to the inaccessibility of these collections via drainage. PROCEDURE:  After obtaining informed consent, the patient was brought to the operating room where she was laid supine on the exam room table. After induction of general endotracheal anesthesia, the patient was prepped and draped in standard fashion. Surgical time-out was conducted and she was receiving ongoing antibiotics and did not receive any additional doses. With this complete, 0.25% Marcaine was infiltrated in the left upper quadrant. A 11-blade scalpel was used to make an incision and a Veress needle was placed to allow for insufflation. Once there was adequate insufflation, a 5-mm port was placed. With this port in place, two additional 5-mm ports were placed, one along the previous midline incision and one in the left lower quadrant.   With these ports in place, the patient was positioned head down and the abdomen was

## 2023-12-20 NOTE — PROGRESS NOTES
Patient evaluated in ICU this morning. Doing well post operatively. Anemia noted without evidence of blood loss. Plan for transfusion per primary team. No further ICU needs at this point. Please reach out with questions. You must check your BS 2-3x/day on glimepiride otherwise we will have to find an alternative medication for you     Take glimepiride before eating and not after     Notify me for any questions regarding your medication use and mechanism of action, or your treatment plan   Notify MD for any potential side effects of prescribed medications,   Continue medication adherence   Make sure to keep an accurate and updated med list with all healthcare professionals

## 2023-12-20 NOTE — PLAN OF CARE
Problem: Physical Therapy - Adult  Goal: By Discharge: Performs mobility at highest level of function for planned discharge setting. See evaluation for individualized goals. Description: FUNCTIONAL STATUS PRIOR TO ADMISSION: Patient was modified independent using a rolling walker for functional mobility. HOME SUPPORT PRIOR TO ADMISSION: The patient lived with family but did not require assistance. Physical Therapy Goals   Initiated 12/20/2023  1. Patient will move from supine to sit and sit to supine, scoot up and down, and roll side to side in bed with modified independence within 7 day(s). 2.  Patient will perform sit to stand with modified independence within 7 day(s). 3.  Patient will transfer from bed to chair and chair to bed with modified independence using the least restrictive device within 7 day(s). 4.  Patient will ambulate with modified independence for 200 feet with the least restrictive device within 7 day(s). Outcome: Progressing   PHYSICAL THERAPY EVALUATION    Patient: Muna Sidhu (80 y.o. female)  Date: 12/20/2023  Primary Diagnosis: Postoperative intra-abdominal abscess [T81.43XA]  Abscess [L02.91]  Bandemia [D72.825]  Paralytic ileus of small intestine (HCC) [K56.0]  Procedure(s) (LRB):  EXPLORATORY LAPAROTOMY, PARTIAL COLECTOMY, OMENECTOMY, ABDOMINAL WASHOUT, CREATION OF OSTOMY (N/A) 2 Days Post-Op   Precautions:                      ASSESSMENT :   DEFICITS/IMPAIRMENTS:   The patient is limited by decreased functional mobility, independence in ADLs, ROM, strength, body mechanics, activity tolerance, endurance, safety awareness, coordination, balance, increased pain levels     Based on the impairments listed above patient presents with difficulty with ambulation and generalized weakness. Communicated with nurse cleared for therapy. Patient supine on bed when received.  Rolled on the edge of bed min assist, supine to sit min assist, sit to stand min assist, ambulate with [phone]; DOI: [id number]  2. Lemuel FONTAINE, Eduarda KHAN, Clarence KHAN, Endy J. Association of AM-PAC \"6-Clicks\" Basic Mobility and Daily Activity Scores With Discharge Destination. Phys Ther. 2021;101(4):yubp684. doi: 10.1093/ptj/abld222. PMID: 26230442.  3. Mike KHAN, Cindy TRIPP, Latosha S, Krista DUGAN, Jennifer S. Activity Measure for Post-Acute Care \"6-Clicks\" Basic Mobility Scores Predict Discharge Destination After Acute Care Hospitalization in Select Patient Groups: A Retrospective, Observational Study. Arch Rehabil Res Clin Transl. 2022;4(3):162021. doi: 10.1016/j.arrct..216997. PMID: 46153024; PMCID: [id number].  4. Bennie JOSÉ, Luciana S, Jd W, Fátima P. AM-PAC Short Forms Manual 4.0. Revised 2020.                                                                                                                                                                                                                              Pain Ratin/10   Pain Intervention(s):   nursing notified and addressing    Activity Tolerance:   Good    After treatment:   Patient left in no apparent distress sitting up in chair, Call bell within reach, Bed/ chair alarm activated, and Heels elevated for pressure relief    COMMUNICATION/EDUCATION:   The patient's plan of care was discussed with: occupational therapist, registered nurse, physician, and     Patient Education  Education Given To: Patient  Education Provided: Role of Therapy;Transfer Training;Plan of Care;Energy Conservation;Home Exercise Program;IADL Safety;Fall Prevention Strategies;Precautions;Orientation;ADL Adaptive Strategies  Education Method: Verbal  Barriers to Learning: None  Education Outcome: Verbalized understanding;Continued education needed    Thank you for this referral.  ANDREY AMBRIZ, PT,[facility].  Minutes: 43      Physical Therapy Evaluation Charge Determination   History Examination Presentation Decision-Making   LOW Complexity :

## 2023-12-20 NOTE — WOUND CARE
Ostomy Progress Note:  Second postoperative visit for ostomy placement and teaching  Location:[facility]  Date of Surgery:12/18  Surgeon: Rober Joaquin  Patient sitting in chair, awake, alert, oriented X4, denies pain at this time, abdomen distended, appliance intact with no drainage or flatus. Teaching including anatomy/physiology of ostomy, cleaning kristian-wound,when to change bag,how to order supplies, will supply with literature tomorrow after transfer to floor. patient manipulated bag closure. All questions answered , patient would like  to involved in teaching,  will be in tomorrow  Recommendations:  Empty appliance when 1/3 full and PRN. Encourage patient/family to notify nurse and assist w/ pouch emptying to promote self care. Change appliance twice weekly and PRN for leaking ASAP. DO NOT REINFORCE LEAKS to avoid skin breakdown.   Transition of Care:  Ostomy nurse to return and continue teaching tomorrow  Lei Ortiz RN

## 2023-12-20 NOTE — PROGRESS NOTES
Spiritual Care Assessment/Progress Note  [facility]    Name: Marisel Jenkins MRN: [id number]    Age: 47 y.o. Sex: female   Language: English     Date: 12/20/2023            Total Time Calculated: 55 min              Spiritual Assessment begun in [facility]  Service Provided For[de-identified] Patient     Encounter Overview/Reason : Initial Encounter    Spiritual beliefs:      [x] Involved in a stacy tradition/spiritual practice:  Sarah Pereira      [] Supported by a stacy community: none      [] Claims no spiritual orientation:      [] Seeking spiritual identity:           [] Adheres to an individual form of spirituality:      [] Not able to assess:                Identified resources for coping and support system:   Support System: Spouse, Children, Family members       [x] Prayer                  [] Devotional reading               [] Music                  [] Guided Imagery     [] Pet visits                                        [] Other: (COMMENT)     Specific area/focus of visit   Encounter:    Crisis:    Spiritual/Emotional needs: Type: Spiritual Support  Ritual, Rites and Sacraments:    Grief, Loss, and Adjustments:    Ethics/Mediation:    Behavioral Health:    Palliative Care: Advance Care Planning:      Plan/Referrals: Continue Support (comment) (as able)    Narrative:   Initial spiritual care assessment with Mrs. Reva Blackwood in ICU. Reviewed chart prior to visit. Pt is sitting in chair, connected to IV's and drain tubes, awake, alert, and welcoming. She is eating ice chips and shared with  much review of her medical incident. She is trying to keep relaxed to let healing happen, and takes her time talking to make sure she doesn't get out of breath. She is  with a son and daughter (25 & 12 y.o.). She says she holds a Sarah Kelli belief, and was comforting to know that her son has found support for his Sarah Pereira stacy and likes to attend Buddhism with him.  She shared that she works hard and long, and realizes that she needs to set boundaries for herself; to take time to care for herself. She expressed a careful demeanor, and much appreciation for the supportive care and welcomes prayers.     Chaplain Chapito Jeffery M.Div., [facility].  [facility] [phone])

## 2023-12-20 NOTE — CARE COORDINATION
Transition of Care Plan:    RUR: 18%  Prior Level of Functioning: home with family   Disposition: home health per discussion     I met with pt with surgeon,Dr Omari Adhikari. The plan is for pt to transfer to the floor when bed is available. ID physician is being consulted. If pt needs home iv antibiotic therapy,case management will set up home iv antibiotic therapy for pt in addition to specific home health services   Case  management will follow pt closely and see how pt progresses before requesting specific orders from surgeon. Per surgeon,the hope is pt will progress and will be able to discharge with home health services. Ostomy education to be provided in the hospital by nursing and wound care. Case Management; Following for potential home iv antibiotic therapy  Following for home health needs:skilled nursing/PT/OT  Following pt.'s progression with back-up plan for inpatient rehab .     Alina Andrews

## 2023-12-20 NOTE — CARE COORDINATION
12/20/23 0952   Readmission Assessment   Number of Days since last admission? 1-7 days   Previous Disposition Home with Family   Who is being Interviewed Patient   What was the patient's/caregiver's perception as to why they think they needed to return back to the hospital?   (reexacerbation of symptoms)   Did you visit your Primary Care Physician after you left the hospital, before you returned this time? No   Why weren't you able to visit your PCP? Other (Comment)   Did you see a specialist, such as Cardiac, Pulmonary, Orthopedic Physician, etc. after you left the hospital? No   Who advised the patient to return to the hospital? Self-referral   Does the patient report anything that got in the way of taking their medications? Yes   What reasons did they give?  Other (Comment)     Joselyn Villarreal

## 2023-12-20 NOTE — PLAN OF CARE
Problem: Occupational Therapy - Adult  Goal: By Discharge: Performs self-care activities at highest level of function for planned discharge setting.  See evaluation for individualized goals.  Description: FUNCTIONAL STATUS PRIOR TO ADMISSION:  Pt normally independent, active, and working. Had initial abdominal surgery 1 week ago and had discharged home on 12/14/23, then re-admitted 12/16/23. Since initial surgery had been using RW for mobility and requiring supervision/set-up for ADLs.   , ADL Assistance: Independent,  ,  ,  ,  ,  , Homemaking Assistance: Independent, Ambulation Assistance: Independent, Transfer Assistance: Independent, Active : Yes     HOME SUPPORT: Patient lived with her  and 2 teenage sons.    Occupational Therapy Goals:  Initiated 12/20/2023  1.  Patient will perform grooming, standing at sink, with Modified [place] within 7 day(s).  2.  Patient will perform lower body dressing with Modified [place] using AE PRN within 7 day(s).  3.  Patient will perform bathing, sitting and standing PRN, with Supervision within 7 day(s).  4.  Patient will perform toilet transfers with Modified [place]  within 7 day(s).  5.  Patient will perform all aspects of toileting with Modified [place] using AE PRN within 7 day(s).  6.  Patient will participate in upper extremity therapeutic exercise/activities with [place] for 10 minutes within 7 day(s).   Outcome: Progressing     OCCUPATIONAL THERAPY EVALUATION    Patient: Pily Howell (53 y.o. female)  Date: 12/20/2023  Primary Diagnosis: Postoperative intra-abdominal abscess [T81.43XA]  Abscess [L02.91]  Bandemia [D72.825]  Paralytic ileus of small intestine (HCC) [K56.0]  Procedure(s) (LRB):  EXPLORATORY LAPAROTOMY, PARTIAL COLECTOMY, OMENECTOMY, ABDOMINAL WASHOUT, CREATION OF OSTOMY (N/A) 2 Days Post-Op     Precautions:                    ASSESSMENT :  The patient is limited by decreased functional mobility, independence in  ADLs, ROM, strength, activity tolerance, endurance, balance, increased pain levels following re-admission for postoperative intra-abdominal abscess. Pt familiar to this OT from previous admission last week after initial surgery (ex lap with sigmoid colectomy) on 12/7. Pt d/c'd home 12/14 and returned on 12/16 with pain and inability to have bowel movement. Pt now POD 2 s/p ex lap, partial colectomy, abdominal washout, and creation of ostomy, cleared for therapy and seen in ICU.    Based on the impairments listed above patient is presenting below her baseline and will benefit from continued skilled therapy intervention. Pt today is pleasant, conversant, and agreeable to participate. She offers good efforts and is able to recall education from previous visit, demonstrating fair carry-over of education on log roll for bed mobility and use of AD/AE. Pt today able to mobilize up to bathroom for toileting and benefits from increased time for all tasks. Pt with decreased reach for kristian area and distal LB and will require re-training with AE for dressing and toileting. She is seated up in chair with surgeon/MD and RN present. HR up to 110s with activity and all other vitals stable. Anticipate she will progress as she improves medically and is able to have less lines (NGT, bilateral JOHN drains, central line, etc.), however at this time would recommend rehab.     Functional Outcome Measure:  The patient scored 16/24 on the AM-PAC outcome measure which is indicative of increased likelihood of need for follow up skilled OT services at discharge.         PLAN :  Recommendations and Planned Interventions:   self care training, therapeutic activities, functional mobility training, balance training, therapeutic exercise, endurance activities, patient education, home safety training, and family training/education    Frequency/Duration: OT Plan of Care: 5 times/week    Recommendation for discharge: (in order for the patient to meet  Mobility and Transfers for ADLs:    Bed Mobility:     Bed Mobility Training  Bed Mobility Training: Yes  Overall Level of Assistance: Minimum assistance; Additional time  Interventions: Safety awareness training;Verbal cues  Rolling: Minimum assistance; Additional time  Supine to Sit: Minimum assistance; Additional time  Sit to Supine: Additional time; Moderate assistance;Assist X2  Scooting: Minimum assistance; Additional time    Transfers:      Transfer Training  Transfer Training: Yes  Overall Level of Assistance: Minimum assistance; Additional time  Interventions: Safety awareness training;Verbal cues  Sit to Stand: Minimum assistance; Additional time  Stand to Sit: Minimum assistance; Additional time  Stand Pivot Transfers: Minimum assistance; Additional time  Bed to Chair: Minimum assistance; Additional time  Toilet Transfer: Minimum assistance; Adaptive equipment     Balance:   Standing: Impaired  Balance  Sitting: High guard  Standing: Impaired  Standing - Static: Fair  Standing - Dynamic: Fair    ADL Assessment:   Feeding: Independent     Grooming: Setup  Grooming Skilled Clinical Factors: seated    UE Bathing: Minimal assistance  UE Bathing Skilled Clinical Factors: infer based on observations    Skin Care: Chlorhexidine wipes    LE Bathing: Moderate assistance  LE Bathing Skilled Clinical Factors: decreased reach for kristian area hygiene    UE Dressing: Minimal assistance       LE Dressing: Maximum assistance  LE Dressing Skilled Clinical Factors: total A for distal portions/socks and distal threading    Toileting:  Moderate assistance;Maximum assistance  Toileting Skilled Clinical Factors: min A for thoroughness of bladder hygiene; total A for new ostomy mgmt    Skin Care: Chlorhexidine wipes    ADL Intervention and task modifications:    [employer] AM-PACTM \"6 Clicks\"                                                       Daily Activity Inpatient Short Form  How much help from another person does the patient

## 2023-12-21 ENCOUNTER — APPOINTMENT (OUTPATIENT)
Dept: VASCULAR SURGERY | Facility: HOSPITAL | Age: 53
DRG: 856 | End: 2023-12-21
Payer: COMMERCIAL

## 2023-12-21 PROBLEM — D72.829 LEUKOCYTOSIS: Status: ACTIVE | Noted: 2023-12-21

## 2023-12-21 LAB
ANION GAP SERPL CALC-SCNC: 4 MMOL/L (ref 5–15)
BASOPHILS # BLD: 0 K/UL (ref 0–0.1)
BASOPHILS NFR BLD: 0 % (ref 0–1)
BUN SERPL-MCNC: 11 MG/DL (ref 6–20)
BUN/CREAT SERPL: 39 (ref 12–20)
CALCIUM SERPL-MCNC: 7.4 MG/DL (ref 8.5–10.1)
CHLORIDE SERPL-SCNC: 113 MMOL/L (ref 97–108)
CO2 SERPL-SCNC: 26 MMOL/L (ref 21–32)
CREAT SERPL-MCNC: 0.28 MG/DL (ref 0.55–1.02)
DIFFERENTIAL METHOD BLD: ABNORMAL
EOSINOPHIL # BLD: 0 K/UL (ref 0–0.4)
EOSINOPHIL NFR BLD: 0 % (ref 0–7)
ERYTHROCYTE [DISTWIDTH] IN BLOOD BY AUTOMATED COUNT: 15.3 % (ref 11.5–14.5)
GLUCOSE SERPL-MCNC: 82 MG/DL (ref 65–100)
HCT VFR BLD AUTO: 26.2 % (ref 35–47)
HGB BLD-MCNC: 8.6 G/DL (ref 11.5–16)
IMM GRANULOCYTES # BLD AUTO: 0 K/UL
IMM GRANULOCYTES NFR BLD AUTO: 0 %
LYMPHOCYTES # BLD: 2.1 K/UL (ref 0.8–3.5)
LYMPHOCYTES NFR BLD: 13 % (ref 12–49)
MCH RBC QN AUTO: 28.3 PG (ref 26–34)
MCHC RBC AUTO-ENTMCNC: 32.8 G/DL (ref 30–36.5)
MCV RBC AUTO: 86.2 FL (ref 80–99)
METAMYELOCYTES NFR BLD MANUAL: 1 %
MONOCYTES # BLD: 0.5 K/UL (ref 0–1)
MONOCYTES NFR BLD: 3 % (ref 5–13)
NEUTS BAND NFR BLD MANUAL: 4 % (ref 0–6)
NEUTS SEG # BLD: 13.2 K/UL (ref 1.8–8)
NEUTS SEG NFR BLD: 79 % (ref 32–75)
NRBC # BLD: 0.02 K/UL (ref 0–0.01)
NRBC BLD-RTO: 0.1 PER 100 WBC
PHOSPHATE SERPL-MCNC: 1.9 MG/DL (ref 2.6–4.7)
PLATELET # BLD AUTO: 346 K/UL (ref 150–400)
PMV BLD AUTO: 9.2 FL (ref 8.9–12.9)
POTASSIUM SERPL-SCNC: 3.2 MMOL/L (ref 3.5–5.1)
RBC # BLD AUTO: 3.04 M/UL (ref 3.8–5.2)
RBC MORPH BLD: ABNORMAL
SODIUM SERPL-SCNC: 143 MMOL/L (ref 136–145)
WBC # BLD AUTO: 15.9 K/UL (ref 3.6–11)
WBC MORPH BLD: ABNORMAL

## 2023-12-21 PROCEDURE — 85025 COMPLETE CBC W/AUTO DIFF WBC: CPT

## 2023-12-21 PROCEDURE — 2500000003 HC RX 250 WO HCPCS: Performed by: NURSE PRACTITIONER

## 2023-12-21 PROCEDURE — A4216 STERILE WATER/SALINE, 10 ML: HCPCS | Performed by: NURSE PRACTITIONER

## 2023-12-21 PROCEDURE — 2580000003 HC RX 258: Performed by: SURGERY

## 2023-12-21 PROCEDURE — 6370000000 HC RX 637 (ALT 250 FOR IP): Performed by: STUDENT IN AN ORGANIZED HEALTH CARE EDUCATION/TRAINING PROGRAM

## 2023-12-21 PROCEDURE — 6360000002 HC RX W HCPCS: Performed by: NURSE PRACTITIONER

## 2023-12-21 PROCEDURE — 6360000002 HC RX W HCPCS: Performed by: SURGERY

## 2023-12-21 PROCEDURE — C9113 INJ PANTOPRAZOLE SODIUM, VIA: HCPCS | Performed by: NURSE PRACTITIONER

## 2023-12-21 PROCEDURE — 1100000000 HC RM PRIVATE

## 2023-12-21 PROCEDURE — 36415 COLL VENOUS BLD VENIPUNCTURE: CPT

## 2023-12-21 PROCEDURE — 84100 ASSAY OF PHOSPHORUS: CPT

## 2023-12-21 PROCEDURE — 80048 BASIC METABOLIC PNL TOTAL CA: CPT

## 2023-12-21 PROCEDURE — 99232 SBSQ HOSP IP/OBS MODERATE 35: CPT | Performed by: INTERNAL MEDICINE

## 2023-12-21 PROCEDURE — 2500000003 HC RX 250 WO HCPCS: Performed by: SURGERY

## 2023-12-21 PROCEDURE — 2580000003 HC RX 258: Performed by: NURSE PRACTITIONER

## 2023-12-21 PROCEDURE — 87040 BLOOD CULTURE FOR BACTERIA: CPT

## 2023-12-21 RX ORDER — POTASSIUM CHLORIDE 7.45 MG/ML
10 INJECTION INTRAVENOUS
Status: DISCONTINUED | OUTPATIENT
Start: 2023-12-21 | End: 2023-12-21

## 2023-12-21 RX ORDER — SODIUM CHLORIDE AND POTASSIUM CHLORIDE 150; 900 MG/100ML; MG/100ML
INJECTION, SOLUTION INTRAVENOUS CONTINUOUS
Status: DISCONTINUED | OUTPATIENT
Start: 2023-12-21 | End: 2023-12-22

## 2023-12-21 RX ADMIN — ACETAMINOPHEN 650 MG: 650 SUPPOSITORY RECTAL at 04:55

## 2023-12-21 RX ADMIN — FLUCONAZOLE 400 MG: 2 INJECTION, SOLUTION INTRAVENOUS at 21:15

## 2023-12-21 RX ADMIN — ENOXAPARIN SODIUM 40 MG: 100 INJECTION SUBCUTANEOUS at 09:09

## 2023-12-21 RX ADMIN — HYDROMORPHONE HYDROCHLORIDE 1 MG: 1 INJECTION, SOLUTION INTRAMUSCULAR; INTRAVENOUS; SUBCUTANEOUS at 18:11

## 2023-12-21 RX ADMIN — PROCHLORPERAZINE EDISYLATE 10 MG: 5 INJECTION INTRAMUSCULAR; INTRAVENOUS at 09:05

## 2023-12-21 RX ADMIN — HYDROMORPHONE HYDROCHLORIDE 1 MG: 1 INJECTION, SOLUTION INTRAMUSCULAR; INTRAVENOUS; SUBCUTANEOUS at 11:52

## 2023-12-21 RX ADMIN — METRONIDAZOLE 500 MG: 500 INJECTION, SOLUTION INTRAVENOUS at 21:30

## 2023-12-21 RX ADMIN — HYDROMORPHONE HYDROCHLORIDE 1 MG: 1 INJECTION, SOLUTION INTRAMUSCULAR; INTRAVENOUS; SUBCUTANEOUS at 14:55

## 2023-12-21 RX ADMIN — POTASSIUM PHOSPHATE, MONOBASIC POTASSIUM PHOSPHATE, DIBASIC 30 MMOL: 224; 236 INJECTION, SOLUTION, CONCENTRATE INTRAVENOUS at 21:34

## 2023-12-21 RX ADMIN — POTASSIUM CHLORIDE AND SODIUM CHLORIDE: 900; 150 INJECTION, SOLUTION INTRAVENOUS at 14:55

## 2023-12-21 RX ADMIN — METRONIDAZOLE 500 MG: 500 INJECTION, SOLUTION INTRAVENOUS at 02:28

## 2023-12-21 RX ADMIN — HYDROMORPHONE HYDROCHLORIDE 1 MG: 1 INJECTION, SOLUTION INTRAMUSCULAR; INTRAVENOUS; SUBCUTANEOUS at 09:04

## 2023-12-21 RX ADMIN — CEFEPIME 2000 MG: 2 INJECTION, POWDER, FOR SOLUTION INTRAVENOUS at 05:35

## 2023-12-21 RX ADMIN — CEFEPIME 2000 MG: 2 INJECTION, POWDER, FOR SOLUTION INTRAVENOUS at 14:47

## 2023-12-21 RX ADMIN — PANTOPRAZOLE SODIUM 40 MG: 40 INJECTION, POWDER, FOR SOLUTION INTRAVENOUS at 09:10

## 2023-12-21 RX ADMIN — VANCOMYCIN HYDROCHLORIDE 1500 MG: 1.5 INJECTION, POWDER, LYOPHILIZED, FOR SOLUTION INTRAVENOUS at 18:13

## 2023-12-21 RX ADMIN — VANCOMYCIN HYDROCHLORIDE 1500 MG: 1.5 INJECTION, POWDER, LYOPHILIZED, FOR SOLUTION INTRAVENOUS at 06:37

## 2023-12-21 RX ADMIN — ACETAMINOPHEN 650 MG: 650 SUPPOSITORY RECTAL at 23:35

## 2023-12-21 RX ADMIN — PROCHLORPERAZINE EDISYLATE 10 MG: 5 INJECTION INTRAMUSCULAR; INTRAVENOUS at 02:33

## 2023-12-21 RX ADMIN — POTASSIUM CHLORIDE AND SODIUM CHLORIDE: 900; 150 INJECTION, SOLUTION INTRAVENOUS at 23:55

## 2023-12-21 RX ADMIN — CEFEPIME 2000 MG: 2 INJECTION, POWDER, FOR SOLUTION INTRAVENOUS at 23:54

## 2023-12-21 RX ADMIN — HYDROMORPHONE HYDROCHLORIDE 1 MG: 1 INJECTION, SOLUTION INTRAMUSCULAR; INTRAVENOUS; SUBCUTANEOUS at 05:25

## 2023-12-21 RX ADMIN — HYDROMORPHONE HYDROCHLORIDE 1 MG: 1 INJECTION, SOLUTION INTRAMUSCULAR; INTRAVENOUS; SUBCUTANEOUS at 21:26

## 2023-12-21 RX ADMIN — HYDROMORPHONE HYDROCHLORIDE 1 MG: 1 INJECTION, SOLUTION INTRAMUSCULAR; INTRAVENOUS; SUBCUTANEOUS at 02:23

## 2023-12-21 ASSESSMENT — PAIN DESCRIPTION - LOCATION
LOCATION: ABDOMEN

## 2023-12-21 ASSESSMENT — PAIN - FUNCTIONAL ASSESSMENT: PAIN_FUNCTIONAL_ASSESSMENT: PREVENTS OR INTERFERES SOME ACTIVE ACTIVITIES AND ADLS

## 2023-12-21 ASSESSMENT — PAIN DESCRIPTION - DESCRIPTORS
DESCRIPTORS: TIGHTNESS;PRESSURE;SORE
DESCRIPTORS: TIGHTNESS;DISCOMFORT
DESCRIPTORS: SORE;PRESSURE;TIGHTNESS
DESCRIPTORS: ACHING;CRAMPING
DESCRIPTORS: ACHING

## 2023-12-21 ASSESSMENT — PAIN SCALES - GENERAL
PAINLEVEL_OUTOF10: 7
PAINLEVEL_OUTOF10: 9
PAINLEVEL_OUTOF10: 5

## 2023-12-21 ASSESSMENT — PAIN DESCRIPTION - ORIENTATION
ORIENTATION: ANTERIOR
ORIENTATION: ANTERIOR

## 2023-12-21 NOTE — CARE COORDINATION
Care Management Progress Note      ICD-10-CM    1. Postoperative intra-abdominal abscess  T81.43XA       2. Paralytic ileus of small intestine (HCC)  K56.0       3. Bandemia  D72.825       4. Bacteremia  R78.81 Echo (TTE) complete (PRN contrast/bubble/strain/3D)     Echo (TTE) complete (PRN contrast/bubble/strain/3D)      5. Left arm swelling  M79.89 Vascular duplex upper extremity arteries left     Vascular duplex upper extremity arteries left          RUR:  17%  Risk Level: []Low [x]Moderate []High    Transition of care plan:  Per IDR, patient is POD3 ex lap partial colectomy, omenectomy, abdominal washout, and creation of an ostomy. Patient is being followed by PT-OT/wound care/ID. Patient continues to receive IV abx and states she will be here through [facility]. Dispo: patient plans to dc home and is agreeable to receiving [address] [place] [address],[address] if needed. Pt does not have a choice in [address] [place] [address],[address] providers. Will send referrals in [employer]. 3.   Pt states  is willing to learn ostomy care to help at home. 4.   Outpatient follow-up.     5.   Pt's family to transport.        ___________________________________________   Cain Cervantes RN Case Manager  12/21/2023   3:21 PM

## 2023-12-21 NOTE — PROGRESS NOTES
Occupational Therapy:  12/21/23    Chart reviewed in prep for OT tx. Pt currently with increased pain being managed by RN at bedside. Will defer to allow pain medication to take effect and re-attempt for OT intervention.      Thank you,  Jared Cuevas, OTR/L

## 2023-12-21 NOTE — WOUND CARE
Ostomy visit: post op day # 3 EXPLORATORY LAPAROTOMY, PARTIAL COLECTOMY, OMENECTOMY, ABDOMINAL WASHOUT, CREATION OF OSTOMY. In with patient to begin ostomy change/teaching. Her spouse Fredy Turk not feeling well today and at doctors. We used hand held mirror for patient to view stoma. She is resting on [facility] bed; up to bathroom with walker, mobility impressive in post op period. Assessment:  LUQ colostomy - moist pink/red stoma, just above skin level, tiny area of slough laterally, surrounding skin without redness or injury. Stoma sits within fold. Small amount of serosanguinous output only. Has NG tube held in with bridal.  Teaching/Care:  Explained to patient use of one piece appliance to accommodate skin fold - we  used cohesive seal around back of opening to help fill area and caulk. Explained measuring and demonstrated and cutting, placing, and opening and closing appliance. Patient used hand held mirror to watch and asked appropriate questions. Recommendations/Plan:  Have patient assist when emptying appliance to learn needed skill. Change if leaking occurs. Will continue to follow.   Serena Boas, RN, [employer]

## 2023-12-21 NOTE — PROGRESS NOTES
Ultrasound IV by Kennedy Morrow RN :  Procedure Note    Ultrasound IV education provided to patient. Opportunities for questions given. Ultrasound used for PIV placement:  20gauge 8 cm BD [employer]  R upper arm location. 1 X Attempt(s). Flushed with ease; vigorous blood return. Procedure tolerated well. Primary RN aware of IV placement and added to LDA.       Kennedy Morrow RN

## 2023-12-21 NOTE — PROGRESS NOTES
2000  MD called back regarding pt's NG tube. MD ordered NG tube to be intermittent low suction. MD ordered Dilaudid 1mg Q3 PRN and to hold off PO medications.

## 2023-12-21 NOTE — PLAN OF CARE
Problem: Discharge Planning  Goal: Discharge to home or other facility with appropriate resources  Outcome: Progressing  Flowsheets (Taken 12/20/2023 1600 by Kurtis Crowder RN)  Discharge to home or other facility with appropriate resources: Identify barriers to discharge with patient and caregiver     Problem: Pain  Goal: Verbalizes/displays adequate comfort level or baseline comfort level  Outcome: Progressing  Flowsheets (Taken 12/20/2023 1600 by Kurtis Crowder RN)  Verbalizes/displays adequate comfort level or baseline comfort level:   Encourage patient to monitor pain and request assistance   Assess pain using appropriate pain scale   Administer analgesics based on type and severity of pain and evaluate response     Problem: Safety - Adult  Goal: Free from fall injury  Outcome: Progressing     Problem: ABCDS Injury Assessment  Goal: Absence of physical injury  Outcome: Progressing     Problem: Physical Therapy - Adult  Goal: By Discharge: Performs mobility at highest level of function for planned discharge setting. See evaluation for individualized goals. Description: FUNCTIONAL STATUS PRIOR TO ADMISSION: Patient was modified independent using a rolling walker for functional mobility. HOME SUPPORT PRIOR TO ADMISSION: The patient lived with family but did not require assistance. Physical Therapy Goals   Initiated 12/20/2023  1. Patient will move from supine to sit and sit to supine, scoot up and down, and roll side to side in bed with modified independence within 7 day(s). 2.  Patient will perform sit to stand with modified independence within 7 day(s). 3.  Patient will transfer from bed to chair and chair to bed with modified independence using the least restrictive device within 7 day(s). 4.  Patient will ambulate with modified independence for 200 feet with the least restrictive device within 7 day(s).      12/20/2023 1305 by Alfredo Dietrich PT  Outcome: Progressing     Problem:

## 2023-12-21 NOTE — PROGRESS NOTES
Attempted to work with the patient for Physical Therapy, chart reviewed and discussed with nurse patient having a lot of pain and giving her some pain meds now. We will continue to follow up with the patient for therapy.

## 2023-12-22 ENCOUNTER — APPOINTMENT (OUTPATIENT)
Dept: VASCULAR SURGERY | Facility: HOSPITAL | Age: 53
DRG: 856 | End: 2023-12-22
Attending: SURGERY
Payer: COMMERCIAL

## 2023-12-22 LAB
ABO + RH BLD: NORMAL
ANION GAP SERPL CALC-SCNC: 6 MMOL/L (ref 5–15)
BASOPHILS # BLD: 0 K/UL (ref 0–0.1)
BASOPHILS NFR BLD: 0 % (ref 0–1)
BLD PROD TYP BPU: NORMAL
BLOOD BANK DISPENSE STATUS: NORMAL
BLOOD GROUP ANTIBODIES SERPL: NORMAL
BPU ID: NORMAL
BUN SERPL-MCNC: 7 MG/DL (ref 6–20)
BUN/CREAT SERPL: 35 (ref 12–20)
CALCIUM SERPL-MCNC: 7.3 MG/DL (ref 8.5–10.1)
CHLORIDE SERPL-SCNC: 112 MMOL/L (ref 97–108)
CO2 SERPL-SCNC: 26 MMOL/L (ref 21–32)
CREAT SERPL-MCNC: 0.2 MG/DL (ref 0.55–1.02)
CROSSMATCH RESULT: NORMAL
DIFFERENTIAL METHOD BLD: ABNORMAL
EOSINOPHIL # BLD: 0.1 K/UL (ref 0–0.4)
EOSINOPHIL NFR BLD: 1 % (ref 0–7)
ERYTHROCYTE [DISTWIDTH] IN BLOOD BY AUTOMATED COUNT: 15.7 % (ref 11.5–14.5)
GLUCOSE SERPL-MCNC: 71 MG/DL (ref 65–100)
HCT VFR BLD AUTO: 23.8 % (ref 35–47)
HGB BLD-MCNC: 7.5 G/DL (ref 11.5–16)
IMM GRANULOCYTES # BLD AUTO: 0 K/UL
IMM GRANULOCYTES NFR BLD AUTO: 0 %
LYMPHOCYTES # BLD: 2 K/UL (ref 0.8–3.5)
LYMPHOCYTES NFR BLD: 16 % (ref 12–49)
MAGNESIUM SERPL-MCNC: 2 MG/DL (ref 1.6–2.4)
MCH RBC QN AUTO: 27.9 PG (ref 26–34)
MCHC RBC AUTO-ENTMCNC: 31.5 G/DL (ref 30–36.5)
MCV RBC AUTO: 88.5 FL (ref 80–99)
METAMYELOCYTES NFR BLD MANUAL: 1 %
MONOCYTES # BLD: 0.5 K/UL (ref 0–1)
MONOCYTES NFR BLD: 4 % (ref 5–13)
NEUTS BAND NFR BLD MANUAL: 3 % (ref 0–6)
NEUTS SEG # BLD: 9.7 K/UL (ref 1.8–8)
NEUTS SEG NFR BLD: 75 % (ref 32–75)
NRBC # BLD: 0 K/UL (ref 0–0.01)
NRBC BLD-RTO: 0 PER 100 WBC
PHOSPHATE SERPL-MCNC: 2.8 MG/DL (ref 2.6–4.7)
PLATELET # BLD AUTO: 364 K/UL (ref 150–400)
PMV BLD AUTO: 9.4 FL (ref 8.9–12.9)
POTASSIUM SERPL-SCNC: 3.3 MMOL/L (ref 3.5–5.1)
RBC # BLD AUTO: 2.69 M/UL (ref 3.8–5.2)
RBC MORPH BLD: ABNORMAL
SODIUM SERPL-SCNC: 144 MMOL/L (ref 136–145)
SPECIMEN EXP DATE BLD: NORMAL
UNIT DIVISION: 0
WBC # BLD AUTO: 12.4 K/UL (ref 3.6–11)
WBC MORPH BLD: ABNORMAL

## 2023-12-22 PROCEDURE — 2580000003 HC RX 258: Performed by: SURGERY

## 2023-12-22 PROCEDURE — 6360000002 HC RX W HCPCS: Performed by: SURGERY

## 2023-12-22 PROCEDURE — C9113 INJ PANTOPRAZOLE SODIUM, VIA: HCPCS | Performed by: NURSE PRACTITIONER

## 2023-12-22 PROCEDURE — 93971 EXTREMITY STUDY: CPT

## 2023-12-22 PROCEDURE — 6370000000 HC RX 637 (ALT 250 FOR IP): Performed by: SURGERY

## 2023-12-22 PROCEDURE — 84100 ASSAY OF PHOSPHORUS: CPT

## 2023-12-22 PROCEDURE — 6360000002 HC RX W HCPCS: Performed by: NURSE PRACTITIONER

## 2023-12-22 PROCEDURE — 2500000003 HC RX 250 WO HCPCS: Performed by: SURGERY

## 2023-12-22 PROCEDURE — 1100000000 HC RM PRIVATE

## 2023-12-22 PROCEDURE — 36415 COLL VENOUS BLD VENIPUNCTURE: CPT

## 2023-12-22 PROCEDURE — 83735 ASSAY OF MAGNESIUM: CPT

## 2023-12-22 PROCEDURE — 97116 GAIT TRAINING THERAPY: CPT

## 2023-12-22 PROCEDURE — 97535 SELF CARE MNGMENT TRAINING: CPT

## 2023-12-22 PROCEDURE — 97530 THERAPEUTIC ACTIVITIES: CPT

## 2023-12-22 PROCEDURE — 99232 SBSQ HOSP IP/OBS MODERATE 35: CPT | Performed by: INTERNAL MEDICINE

## 2023-12-22 PROCEDURE — A4216 STERILE WATER/SALINE, 10 ML: HCPCS | Performed by: NURSE PRACTITIONER

## 2023-12-22 PROCEDURE — 2580000003 HC RX 258: Performed by: NURSE PRACTITIONER

## 2023-12-22 PROCEDURE — 6370000000 HC RX 637 (ALT 250 FOR IP): Performed by: STUDENT IN AN ORGANIZED HEALTH CARE EDUCATION/TRAINING PROGRAM

## 2023-12-22 PROCEDURE — 85025 COMPLETE CBC W/AUTO DIFF WBC: CPT

## 2023-12-22 PROCEDURE — 80048 BASIC METABOLIC PNL TOTAL CA: CPT

## 2023-12-22 RX ORDER — POTASSIUM CHLORIDE 7.45 MG/ML
10 INJECTION INTRAVENOUS
Status: COMPLETED | OUTPATIENT
Start: 2023-12-22 | End: 2023-12-22

## 2023-12-22 RX ORDER — POTASSIUM CHLORIDE 7.45 MG/ML
10 INJECTION INTRAVENOUS
Status: DISPENSED | OUTPATIENT
Start: 2023-12-22 | End: 2023-12-22

## 2023-12-22 RX ADMIN — HYDROMORPHONE HYDROCHLORIDE 1 MG: 1 INJECTION, SOLUTION INTRAMUSCULAR; INTRAVENOUS; SUBCUTANEOUS at 12:26

## 2023-12-22 RX ADMIN — POTASSIUM CHLORIDE 10 MEQ: 7.46 INJECTION, SOLUTION INTRAVENOUS at 19:22

## 2023-12-22 RX ADMIN — CEFEPIME 2000 MG: 2 INJECTION, POWDER, FOR SOLUTION INTRAVENOUS at 15:57

## 2023-12-22 RX ADMIN — ACETAMINOPHEN 650 MG: 650 SUPPOSITORY RECTAL at 05:32

## 2023-12-22 RX ADMIN — METRONIDAZOLE 500 MG: 500 INJECTION, SOLUTION INTRAVENOUS at 05:30

## 2023-12-22 RX ADMIN — OXYCODONE HYDROCHLORIDE 10 MG: 5 TABLET ORAL at 15:49

## 2023-12-22 RX ADMIN — METRONIDAZOLE 500 MG: 500 INJECTION, SOLUTION INTRAVENOUS at 12:26

## 2023-12-22 RX ADMIN — OXYCODONE HYDROCHLORIDE 10 MG: 5 TABLET ORAL at 20:57

## 2023-12-22 RX ADMIN — POTASSIUM CHLORIDE 10 MEQ: 7.46 INJECTION, SOLUTION INTRAVENOUS at 15:52

## 2023-12-22 RX ADMIN — ENOXAPARIN SODIUM 40 MG: 100 INJECTION SUBCUTANEOUS at 09:58

## 2023-12-22 RX ADMIN — POTASSIUM CHLORIDE 10 MEQ: 7.46 INJECTION, SOLUTION INTRAVENOUS at 14:17

## 2023-12-22 RX ADMIN — VANCOMYCIN HYDROCHLORIDE 1500 MG: 1.5 INJECTION, POWDER, LYOPHILIZED, FOR SOLUTION INTRAVENOUS at 09:57

## 2023-12-22 RX ADMIN — HYDROMORPHONE HYDROCHLORIDE 1 MG: 1 INJECTION, SOLUTION INTRAMUSCULAR; INTRAVENOUS; SUBCUTANEOUS at 05:32

## 2023-12-22 RX ADMIN — CEFEPIME 2000 MG: 2 INJECTION, POWDER, FOR SOLUTION INTRAVENOUS at 09:52

## 2023-12-22 RX ADMIN — HYDROMORPHONE HYDROCHLORIDE 1 MG: 1 INJECTION, SOLUTION INTRAMUSCULAR; INTRAVENOUS; SUBCUTANEOUS at 09:28

## 2023-12-22 RX ADMIN — PANTOPRAZOLE SODIUM 40 MG: 40 INJECTION, POWDER, FOR SOLUTION INTRAVENOUS at 09:43

## 2023-12-22 RX ADMIN — PROCHLORPERAZINE EDISYLATE 10 MG: 5 INJECTION INTRAMUSCULAR; INTRAVENOUS at 16:21

## 2023-12-22 RX ADMIN — METRONIDAZOLE 500 MG: 500 INJECTION, SOLUTION INTRAVENOUS at 21:03

## 2023-12-22 RX ADMIN — POTASSIUM CHLORIDE 10 MEQ: 7.46 INJECTION, SOLUTION INTRAVENOUS at 17:47

## 2023-12-22 RX ADMIN — VANCOMYCIN HYDROCHLORIDE 1500 MG: 1.5 INJECTION, POWDER, LYOPHILIZED, FOR SOLUTION INTRAVENOUS at 19:38

## 2023-12-22 RX ADMIN — HYDROMORPHONE HYDROCHLORIDE 1 MG: 1 INJECTION, SOLUTION INTRAMUSCULAR; INTRAVENOUS; SUBCUTANEOUS at 01:45

## 2023-12-22 RX ADMIN — FLUCONAZOLE 400 MG: 2 INJECTION, SOLUTION INTRAVENOUS at 20:59

## 2023-12-22 RX ADMIN — VANCOMYCIN HYDROCHLORIDE 1500 MG: 1.5 INJECTION, POWDER, LYOPHILIZED, FOR SOLUTION INTRAVENOUS at 01:46

## 2023-12-22 ASSESSMENT — PAIN DESCRIPTION - DESCRIPTORS
DESCRIPTORS: DULL
DESCRIPTORS: DULL;THROBBING
DESCRIPTORS: DULL

## 2023-12-22 ASSESSMENT — PAIN DESCRIPTION - ORIENTATION
ORIENTATION: ANTERIOR;RIGHT;LEFT
ORIENTATION: RIGHT;LEFT;ANTERIOR
ORIENTATION: RIGHT;LEFT;ANTERIOR

## 2023-12-22 ASSESSMENT — PAIN SCALES - GENERAL
PAINLEVEL_OUTOF10: 6
PAINLEVEL_OUTOF10: 9
PAINLEVEL_OUTOF10: 8
PAINLEVEL_OUTOF10: 7
PAINLEVEL_OUTOF10: 9
PAINLEVEL_OUTOF10: 9

## 2023-12-22 ASSESSMENT — PAIN DESCRIPTION - LOCATION
LOCATION: ABDOMEN

## 2023-12-22 NOTE — PLAN OF CARE
Problem: Physical Therapy - Adult  Goal: By Discharge: Performs mobility at highest level of function for planned discharge setting. See evaluation for individualized goals. Description: FUNCTIONAL STATUS PRIOR TO ADMISSION: Patient was modified independent using a rolling walker for functional mobility. HOME SUPPORT PRIOR TO ADMISSION: The patient lived with family but did not require assistance. Physical Therapy Goals   Initiated 12/20/2023  1. Patient will move from supine to sit and sit to supine, scoot up and down, and roll side to side in bed with modified independence within 7 day(s). 2.  Patient will perform sit to stand with modified independence within 7 day(s). 3.  Patient will transfer from bed to chair and chair to bed with modified independence using the least restrictive device within 7 day(s). 4.  Patient will ambulate with modified independence for 200 feet with the least restrictive device within 7 day(s). Outcome: Progressing   PHYSICAL THERAPY TREATMENT    Patient: Raquel Sanchez (85 y.o. female)  Date: 12/22/2023  Diagnosis: Postoperative intra-abdominal abscess [T81.43XA]  Abscess [L02.91]  Bandemia [D72.825]  Paralytic ileus of small intestine (HCC) [K56.0] Abscess  Procedure(s) (LRB):  EXPLORATORY LAPAROTOMY, PARTIAL COLECTOMY, OMENECTOMY, ABDOMINAL WASHOUT, CREATION OF OSTOMY (N/A) 4 Days Post-Op  Precautions:                      ASSESSMENT:  Patient continues to benefit from skilled PT services and is progressing towards goals. Communicated with nurse cleared for therapy and was medicated for pain 30 minutes prior. Patient supine on bed when received, rolled on the edge of bed , supine to sit, sit to stand min assist. Ambulate with rolling walker in the room and around the bed multiple times CGA. Assisted to and from the bathroom, steady sitting and standing no loss of balance, slow pace gait.  Offered to ambulate out on the 4th floor hallway, patient declined it's too

## 2023-12-22 NOTE — CARE COORDINATION
Case management Follow-up    RUR 16 (Score %) moderate   Is This a Readmission YES    Current status  Patient discussed during interdisciplinary rounds. Patient continues to require medical management including ongoing assessment and monitoring. CM referral for home health services including nursing, PT,OT. Several ref[facility] have already [facility] and declined including [facility], [facility], [facility], [facility], [facility]. [facility] pending. Additional referral sent to [address] [place] [address] via [facility]. Discussed with patient and she is agreeable with plan. Transition of Care Plan  Monitor patient status and response to treatment. Patient continues to require medical management. CM needs: home health services  Referral sent to [id number]  Await improvement of medical status. CM to monitor progress and recommendations.     Jeanne Allen, RN, MSN/Care manager

## 2023-12-22 NOTE — PLAN OF CARE
Problem: Physical Therapy - Adult  Goal: By Discharge: Performs mobility at highest level of function for planned discharge setting. See evaluation for individualized goals. Description: FUNCTIONAL STATUS PRIOR TO ADMISSION: Patient was modified independent using a rolling walker for functional mobility. HOME SUPPORT PRIOR TO ADMISSION: The patient lived with family but did not require assistance. Physical Therapy Goals   Initiated 12/20/2023  1. Patient will move from supine to sit and sit to supine, scoot up and down, and roll side to side in bed with modified independence within 7 day(s). 2.  Patient will perform sit to stand with modified independence within 7 day(s). 3.  Patient will transfer from bed to chair and chair to bed with modified independence using the least restrictive device within 7 day(s). 4.  Patient will ambulate with modified independence for 200 feet with the least restrictive device within 7 day(s). 12/22/2023 1221 by Quang Dawn PT  Outcome: Progressing     Problem: Discharge Planning  Goal: Discharge to home or other facility with appropriate resources  Outcome: [address] [place] [address] Progressing     Problem: Pain  Goal: Verbalizes/displays adequate comfort level or baseline comfort level  Outcome: [address] [address] Progressing     Problem: Safety - Adult  Goal: Free from fall injury  Outcome: [address] [place] [address] Progressing     Problem: ABCDS Injury Assessment  Goal: Absence of physical injury  Outcome: [address] [place] [address] Progressing     Problem: Skin/Tissue Integrity  Goal: Absence of new skin breakdown  Description: 1. Monitor for areas of redness and/or skin breakdown  2. Assess vascular access sites hourly  3. Every 4-6 hours minimum:  Change oxygen saturation probe site  4. Every 4-6 hours:  If on nasal continuous positive airway pressure, respiratory therapy assess nares and determine need for appliance change or resting period.   Out[facility]: [facility] Progress[facility]     Problem: Nutrition Deficit:  Goal: Optimize nutritional status  Outcome: HH/[facility] Progressing

## 2023-12-22 NOTE — PLAN OF CARE
Problem: Occupational Therapy - Adult  Goal: By Discharge: Performs self-care activities at highest level of function for planned discharge setting. See evaluation for individualized goals. Description: FUNCTIONAL STATUS PRIOR TO ADMISSION:  Pt normally independent, active, and working. Had initial abdominal surgery 1 week ago and had discharged home on 12/14/23, then re-admitted 12/16/23. Since initial surgery had been using RW for mobility and requiring supervision/set-up for ADLs. , ADL Assistance: Independent,  ,  ,  ,  ,  , Homemaking Assistance: Independent, Ambulation Assistance: Independent, Transfer Assistance: Independent, Active : Yes     HOME SUPPORT: Patient lived with her  and 2 teenage sons. Occupational Therapy Goals:  Initiated 12/20/2023  1. Patient will perform grooming, standing at sink, with Modified [place] within 7 day(s). 2.  Patient will perform lower body dressing with Modified [place] using AE PRN within 7 day(s). 3.  Patient will perform bathing, sitting and standing PRN, with Supervision within 7 day(s). 4.  Patient will perform toilet transfers with Modified [place]  within 7 day(s). 5.  Patient will perform all aspects of toileting with Modified [place] using AE PRN within 7 day(s). 6.  Patient will participate in upper extremity therapeutic exercise/activities with Greenleaf for 10 minutes within 7 day(s).    Outcome: Progressing   OCCUPATIONAL THERAPY TREATMENT  Patient: Rachell Hair (20 y.o. female)  Date: 12/22/2023  Primary Diagnosis: Postoperative intra-abdominal abscess [T81.43XA]  Abscess [L02.91]  Bandemia [D72.825]  Paralytic ileus of small intestine (HCC) [K56.0]  Procedure(s) (LRB):  EXPLORATORY LAPAROTOMY, PARTIAL COLECTOMY, OMENECTOMY, ABDOMINAL WASHOUT, CREATION OF OSTOMY (N/A) 4 Days Post-Op   Precautions:                  Chart, occupational therapy assessment, plan of care, and goals were

## 2023-12-22 NOTE — PROGRESS NOTES
NGT to suction, still with dark brown/red output during RD encounter. Reports abd pain and nausea. No chewing/swallowing problems baseline. NKFA. Reports # - patient in chair at time of RD visit, unable to obtain measured weight. Ordered - expect to be elevated from baseline 2/2 generalized edema. Patient denies recent weight changes. Discussed parenteral nutrition and patient voiced acceptance if NGT unable to be removed/diet advanced today. Discussed PN with PharmD.     PPN at goal rate 83 mL/hour with lipids 3x/week  provides 1373 kcal (67% needs); 84 g protein (91% needs)          Wt Readings from Last 10 Encounters:   12/20/23 114.8 kg (253 lb)   12/09/23 114.8 kg (253 lb)         Nutrition Related Findings:            Last BM: 12/08/23  Edema: Generalized   Edema Generalized: +2     LUE Edema: Non-pitting  RLE Edema: Non-pitting  LLE Edema: Non-pitting    Nutr. Labs:    Lab Results   Component Value Date    CREATININE 0.20 (L) 12/22/2023    BUN 7 12/22/2023     12/22/2023    K 3.3 (L) 12/22/2023     (H) 12/22/2023    CO2 26 12/22/2023       Lab Results   Component Value Date/Time    POCGLU 139 12/16/2023 10:19 PM        No results found for: \"LABA1C\", \"[id number]\"    Lab Results   Component Value Date/Time    MG 2.0 12/22/2023 02:13 AM       Lab Results   Component Value Date    CALCIUM 7.3 (L) 12/22/2023    PHOS 2.8 12/22/2023        No results found for: \"TRIG\"    Nutr. Meds:  Scheduled Meds:   pantoprazole (PROTONIX) 40 mg in sodium chloride (PF) 0.9 % 10 mL injection  40 mg IntraVENous Daily    enoxaparin  40 mg SubCUTAneous Daily    acetaminophen  650 mg Rectal Q6H    vancomycin  1,500 mg IntraVENous Q8H    fluconazole  400 mg IntraVENous Q24H    cefepime  2,000 mg IntraVENous Q8H    metroNIDAZOLE  500 mg IntraVENous Q8H     Continuous Infusions:   0.9% NaCl with KCl 20 mEq 100 mL/hr at 12/21/23 2355    sodium chloride Stopped (12/19/23 2155)     PRN Meds: HYDROmorphone, HYDROmorphone,  sodium chloride, prochlorperazine, oxyCODONE, oxyCODONE, acetaminophen        Current Nutrition Intake & Therapies:    Average Meal Intake: NPO  Average Supplements Intake: NPO  Diet NPO    Anthropometric Measures:  Height: 167 cm (5' 5.75\")  Ideal Body Weight (IBW): 129 lbs (59 kg)       Current Body Weight: 114.8 kg (253 lb 1.4 oz), 196.2 % IBW. Weight Source: Stated  Current BMI (kg/m2): 41.2        Weight Adjustment For: No Adjustment                 BMI Categories: Obese Class 3 (BMI 40.0 or greater)    Estimated Daily Nutrient Needs:  Energy Requirements Based On: Formula  Weight Used for Energy Requirements: Current  Energy (kcal/day): 2054 (MSJ x 1.3 - 250)  Weight Used for Protein Requirements: Current  Protein (g/day):  (0.8-1.0 g/kg)  Method Used for Fluid Requirements: 1 ml/kcal  Fluid (ml/day): 2054    Nutrition Diagnosis:   Moderate malnutrition related to acute injury/trauma, impaired nutrient utilization, altered GI structure as evidenced by wounds, Criteria as identified in malnutrition assessment, NPO or clear liquid status due to medical condition, localized or generalized fluid accumulation (s/p abd surgery)    Nutrition Interventions:   Food and/or Nutrient Delivery: Continue NPO (while NGT to suction, consider PN)  Nutrition Education/Counseling: Survival skills/brief education completed  Coordination of Nutrition Care: Continue to monitor while inpatient, Interdisciplinary Rounds  Plan of Care discussed with: PharmD, nursing, patient    Goals:     Goals: Meet at least 75% of estimated needs, by next RD assessment       Nutrition Monitoring and Evaluation:   Behavioral-Environmental Outcomes: None Identified  Food/Nutrient Intake Outcomes: Diet Advancement/Tolerance (vs PN tolerance)  Physical Signs/Symptoms Outcomes: Biochemical Data, Meal Time Behavior, GI Status, Weight, Fluid Status or Edema, Nausea or Vomiting    Discharge Planning:    Too soon to determine     Alba Wynn RD  MS  Contact: Ext: [phone], or via [employer]

## 2023-12-22 NOTE — WOUND CARE
Ostomy visit:  post op day # 4 EXPLORATORY LAPAROTOMY, PARTIAL COLECTOMY, OMENECTOMY, ABDOMINAL WASHOUT, CREATION OF OSTOMY. In with Dr. Sera Benson who pulled NG without discomfort. Patient started on clear liquids. Patient noting [place] / gas in abdomen and ostomy has started to put out liquid brown output. Good seal with pouch, one piece fitting well into skin fold. Discussed plans: next ostomy visit planned for Tuesday, 12/26 for continued teaching/appliance change with patient and her spouse Tammy Thompson. Written info given today. Reinforced points of teaching including emptying; how often to change.   Gordy Main RN, [employer]

## 2023-12-23 LAB — ECHO BSA: 2.31 M2

## 2023-12-23 PROCEDURE — 2580000003 HC RX 258: Performed by: NURSE PRACTITIONER

## 2023-12-23 PROCEDURE — 1100000000 HC RM PRIVATE

## 2023-12-23 PROCEDURE — 2580000003 HC RX 258: Performed by: SURGERY

## 2023-12-23 PROCEDURE — 6370000000 HC RX 637 (ALT 250 FOR IP): Performed by: SURGERY

## 2023-12-23 PROCEDURE — 6360000002 HC RX W HCPCS: Performed by: NURSE PRACTITIONER

## 2023-12-23 PROCEDURE — 6360000002 HC RX W HCPCS: Performed by: SURGERY

## 2023-12-23 PROCEDURE — A4216 STERILE WATER/SALINE, 10 ML: HCPCS | Performed by: NURSE PRACTITIONER

## 2023-12-23 PROCEDURE — C9113 INJ PANTOPRAZOLE SODIUM, VIA: HCPCS | Performed by: NURSE PRACTITIONER

## 2023-12-23 RX ADMIN — OXYCODONE HYDROCHLORIDE 10 MG: 5 TABLET ORAL at 18:04

## 2023-12-23 RX ADMIN — ENOXAPARIN SODIUM 40 MG: 100 INJECTION SUBCUTANEOUS at 09:10

## 2023-12-23 RX ADMIN — OXYCODONE HYDROCHLORIDE 10 MG: 5 TABLET ORAL at 01:22

## 2023-12-23 RX ADMIN — OXYCODONE HYDROCHLORIDE 10 MG: 5 TABLET ORAL at 22:31

## 2023-12-23 RX ADMIN — PANTOPRAZOLE SODIUM 40 MG: 40 INJECTION, POWDER, FOR SOLUTION INTRAVENOUS at 09:07

## 2023-12-23 RX ADMIN — OXYCODONE HYDROCHLORIDE 10 MG: 5 TABLET ORAL at 05:01

## 2023-12-23 RX ADMIN — PROCHLORPERAZINE EDISYLATE 10 MG: 5 INJECTION INTRAMUSCULAR; INTRAVENOUS at 15:45

## 2023-12-23 RX ADMIN — METRONIDAZOLE 500 MG: 500 INJECTION, SOLUTION INTRAVENOUS at 12:16

## 2023-12-23 RX ADMIN — VANCOMYCIN HYDROCHLORIDE 1500 MG: 1.5 INJECTION, POWDER, LYOPHILIZED, FOR SOLUTION INTRAVENOUS at 01:21

## 2023-12-23 RX ADMIN — SODIUM CHLORIDE: 9 INJECTION, SOLUTION INTRAVENOUS at 17:05

## 2023-12-23 RX ADMIN — SODIUM CHLORIDE: 9 INJECTION, SOLUTION INTRAVENOUS at 09:14

## 2023-12-23 RX ADMIN — OXYCODONE HYDROCHLORIDE 10 MG: 5 TABLET ORAL at 14:09

## 2023-12-23 RX ADMIN — FLUCONAZOLE 400 MG: 2 INJECTION, SOLUTION INTRAVENOUS at 22:42

## 2023-12-23 RX ADMIN — CEFEPIME 2000 MG: 2 INJECTION, POWDER, FOR SOLUTION INTRAVENOUS at 17:04

## 2023-12-23 RX ADMIN — CEFEPIME 2000 MG: 2 INJECTION, POWDER, FOR SOLUTION INTRAVENOUS at 09:07

## 2023-12-23 RX ADMIN — METRONIDAZOLE 500 MG: 500 INJECTION, SOLUTION INTRAVENOUS at 21:41

## 2023-12-23 RX ADMIN — CEFEPIME 2000 MG: 2 INJECTION, POWDER, FOR SOLUTION INTRAVENOUS at 01:20

## 2023-12-23 RX ADMIN — VANCOMYCIN HYDROCHLORIDE 1500 MG: 1.5 INJECTION, POWDER, LYOPHILIZED, FOR SOLUTION INTRAVENOUS at 09:21

## 2023-12-23 RX ADMIN — OXYCODONE HYDROCHLORIDE 10 MG: 5 TABLET ORAL at 09:23

## 2023-12-23 RX ADMIN — METRONIDAZOLE 500 MG: 500 INJECTION, SOLUTION INTRAVENOUS at 04:56

## 2023-12-23 RX ADMIN — VANCOMYCIN HYDROCHLORIDE 1500 MG: 1.5 INJECTION, POWDER, LYOPHILIZED, FOR SOLUTION INTRAVENOUS at 17:09

## 2023-12-23 ASSESSMENT — PAIN DESCRIPTION - ORIENTATION
ORIENTATION: ANTERIOR

## 2023-12-23 ASSESSMENT — PAIN SCALES - GENERAL
PAINLEVEL_OUTOF10: 5
PAINLEVEL_OUTOF10: 8
PAINLEVEL_OUTOF10: 7
PAINLEVEL_OUTOF10: 5
PAINLEVEL_OUTOF10: 7
PAINLEVEL_OUTOF10: 0
PAINLEVEL_OUTOF10: 3
PAINLEVEL_OUTOF10: 7
PAINLEVEL_OUTOF10: 7

## 2023-12-23 ASSESSMENT — PAIN DESCRIPTION - DESCRIPTORS
DESCRIPTORS: ACHING;SORE
DESCRIPTORS: ACHING
DESCRIPTORS: ACHING

## 2023-12-23 ASSESSMENT — PAIN DESCRIPTION - LOCATION
LOCATION: ABDOMEN

## 2023-12-23 NOTE — CARE COORDINATION
12/23/2023 9:08 AM   Transition of Care Plan  Monitor patient status and response to treatment. Patient continues to require medical management. CM needs: home health services  Home health referrals pending acceptance  Await improvement of medical status. CM to monitor progress and recommendations. Home health referrals have been sent to:   [facility]- denied due to staffing  [address]- denied due to insurance  [facility]- denied due to [place]- denied due to insurance  [facility]- denied due to insurance  [facility]- pending  [facility]- denied due to service area  [facility]- denied due to insurance

## 2023-12-24 LAB
CREAT SERPL-MCNC: 0.31 MG/DL (ref 0.55–1.02)
VANCOMYCIN SERPL-MCNC: 14.1 UG/ML

## 2023-12-24 PROCEDURE — 2580000003 HC RX 258: Performed by: SURGERY

## 2023-12-24 PROCEDURE — 6360000002 HC RX W HCPCS: Performed by: NURSE PRACTITIONER

## 2023-12-24 PROCEDURE — 6360000002 HC RX W HCPCS: Performed by: SURGERY

## 2023-12-24 PROCEDURE — 97530 THERAPEUTIC ACTIVITIES: CPT

## 2023-12-24 PROCEDURE — 6370000000 HC RX 637 (ALT 250 FOR IP): Performed by: SURGERY

## 2023-12-24 PROCEDURE — 36415 COLL VENOUS BLD VENIPUNCTURE: CPT

## 2023-12-24 PROCEDURE — 1100000000 HC RM PRIVATE

## 2023-12-24 PROCEDURE — 80202 ASSAY OF VANCOMYCIN: CPT

## 2023-12-24 PROCEDURE — A4216 STERILE WATER/SALINE, 10 ML: HCPCS | Performed by: NURSE PRACTITIONER

## 2023-12-24 PROCEDURE — 82565 ASSAY OF CREATININE: CPT

## 2023-12-24 PROCEDURE — 2580000003 HC RX 258: Performed by: NURSE PRACTITIONER

## 2023-12-24 PROCEDURE — C9113 INJ PANTOPRAZOLE SODIUM, VIA: HCPCS | Performed by: NURSE PRACTITIONER

## 2023-12-24 PROCEDURE — 97116 GAIT TRAINING THERAPY: CPT

## 2023-12-24 RX ADMIN — CEFEPIME 2000 MG: 2 INJECTION, POWDER, FOR SOLUTION INTRAVENOUS at 01:17

## 2023-12-24 RX ADMIN — VANCOMYCIN HYDROCHLORIDE 1500 MG: 1.5 INJECTION, POWDER, LYOPHILIZED, FOR SOLUTION INTRAVENOUS at 20:55

## 2023-12-24 RX ADMIN — VANCOMYCIN HYDROCHLORIDE 1500 MG: 1.5 INJECTION, POWDER, LYOPHILIZED, FOR SOLUTION INTRAVENOUS at 03:03

## 2023-12-24 RX ADMIN — PROCHLORPERAZINE EDISYLATE 10 MG: 5 INJECTION INTRAMUSCULAR; INTRAVENOUS at 12:21

## 2023-12-24 RX ADMIN — OXYCODONE HYDROCHLORIDE 10 MG: 5 TABLET ORAL at 08:12

## 2023-12-24 RX ADMIN — CEFEPIME 2000 MG: 2 INJECTION, POWDER, FOR SOLUTION INTRAVENOUS at 18:02

## 2023-12-24 RX ADMIN — PANTOPRAZOLE SODIUM 40 MG: 40 INJECTION, POWDER, FOR SOLUTION INTRAVENOUS at 08:04

## 2023-12-24 RX ADMIN — OXYCODONE HYDROCHLORIDE 10 MG: 5 TABLET ORAL at 22:35

## 2023-12-24 RX ADMIN — CEFEPIME 2000 MG: 2 INJECTION, POWDER, FOR SOLUTION INTRAVENOUS at 08:04

## 2023-12-24 RX ADMIN — METRONIDAZOLE 500 MG: 500 INJECTION, SOLUTION INTRAVENOUS at 05:22

## 2023-12-24 RX ADMIN — VANCOMYCIN HYDROCHLORIDE 1500 MG: 1.5 INJECTION, POWDER, LYOPHILIZED, FOR SOLUTION INTRAVENOUS at 11:42

## 2023-12-24 RX ADMIN — ENOXAPARIN SODIUM 40 MG: 100 INJECTION SUBCUTANEOUS at 08:05

## 2023-12-24 RX ADMIN — OXYCODONE HYDROCHLORIDE 10 MG: 5 TABLET ORAL at 02:54

## 2023-12-24 RX ADMIN — METRONIDAZOLE 500 MG: 500 INJECTION, SOLUTION INTRAVENOUS at 15:58

## 2023-12-24 RX ADMIN — OXYCODONE HYDROCHLORIDE 10 MG: 5 TABLET ORAL at 12:20

## 2023-12-24 RX ADMIN — OXYCODONE HYDROCHLORIDE 10 MG: 5 TABLET ORAL at 17:57

## 2023-12-24 ASSESSMENT — PAIN DESCRIPTION - DESCRIPTORS
DESCRIPTORS: SORE
DESCRIPTORS: ACHING
DESCRIPTORS: ACHING;DULL

## 2023-12-24 ASSESSMENT — PAIN SCALES - GENERAL
PAINLEVEL_OUTOF10: 3
PAINLEVEL_OUTOF10: 0
PAINLEVEL_OUTOF10: 2
PAINLEVEL_OUTOF10: 6
PAINLEVEL_OUTOF10: 7
PAINLEVEL_OUTOF10: 2
PAINLEVEL_OUTOF10: 7
PAINLEVEL_OUTOF10: 2
PAINLEVEL_OUTOF10: 6
PAINLEVEL_OUTOF10: 2
PAINLEVEL_OUTOF10: 7

## 2023-12-24 ASSESSMENT — PAIN DESCRIPTION - LOCATION
LOCATION: ABDOMEN

## 2023-12-24 ASSESSMENT — PAIN DESCRIPTION - ORIENTATION: ORIENTATION: LOWER

## 2023-12-24 NOTE — PLAN OF CARE
Problem: Physical Therapy - Adult  Goal: By Discharge: Performs mobility at highest level of function for planned discharge setting. See evaluation for individualized goals. Description: FUNCTIONAL STATUS PRIOR TO ADMISSION: Patient was modified independent using a rolling walker for functional mobility. HOME SUPPORT PRIOR TO ADMISSION: The patient lived with family but did not require assistance. Physical Therapy Goals   Initiated 12/20/2023  1. Patient will move from supine to sit and sit to supine, scoot up and down, and roll side to side in bed with modified independence within 7 day(s). 2.  Patient will perform sit to stand with modified independence within 7 day(s). 3.  Patient will transfer from bed to chair and chair to bed with modified independence using the least restrictive device within 7 day(s). 4.  Patient will ambulate with modified independence for 200 feet with the least restrictive device within 7 day(s). Outcome: Progressing   PHYSICAL THERAPY TREATMENT    Patient: [facility] (83 y.o. female)  Date: 12/24/2023  Diagnosis: Postoperative intra-abdominal abscess [T81.43XA]  Abscess [L02.91]  Bandemia [D72.825]  Paralytic ileus of small intestine (HCC) [K56.0] Abscess  Procedure(s) (LRB):  EXPLORATORY LAPAROTOMY, PARTIAL COLECTOMY, OMENECTOMY, ABDOMINAL WASHOUT, CREATION OF OSTOMY (N/A) 6 Days Post-Op  Precautions:                      ASSESSMENT:  Patient continues to benefit from skilled PT services. Pt supine to sit to stand with SBA. Pt ambulated 45ft in room with RW SBA. Pt assisted in restroom before back to bed with min assist.Pt has a binder that could not be located limiting increased ambulation today. Pt progressing well. Continue goals. PLAN:  Patient continues to benefit from skilled intervention to address the above impairments. Continue treatment per established plan of care.     Recommendation for discharge: (in order for the patient to meet his/her long term goals): Therapy 3 hours/day 5-7 days/week    Other factors to consider for discharge:       IF patient discharges home will need the following DME: rolling walker       SUBJECTIVE:       OBJECTIVE DATA SUMMARY:   Critical Behavior:     Cognition  Overall Cognitive Status: WNL    Functional Mobility Training:  Bed Mobility:  Bed Mobility Training  Supine to Sit: Stand-by assistance  Sit to Supine: Minimum assistance  Transfers:  Transfer Training  Sit to Stand: Stand-by assistance  Stand to Sit: Stand-by assistance  Balance:  Balance  Sitting: Intact  Standing: With support   Ambulation/Gait Training:     Gait  Overall Level of Assistance: Contact-guard assistance;Stand-by assistance  Distance (ft): 45 Feet  Assistive Device: Walker, rolling;Gait belt  Base of Support: Widened  Speed/Mirna: Pace decreased (< 100 feet/min)  Step Length: Right shortened;Left shortened      Activity Tolerance:   Good    After treatment:   Patient left in no apparent distress in bed      COMMUNICATION/EDUCATION:   The patient's plan of care was discussed with: physical therapist           Gilbert Singh PTA  Minutes: 38

## 2023-12-25 LAB
ANION GAP SERPL CALC-SCNC: 4 MMOL/L (ref 5–15)
BASOPHILS # BLD: 0 K/UL (ref 0–0.1)
BASOPHILS NFR BLD: 0 % (ref 0–1)
BUN SERPL-MCNC: 2 MG/DL (ref 6–20)
BUN/CREAT SERPL: 8 (ref 12–20)
CALCIUM SERPL-MCNC: 7.4 MG/DL (ref 8.5–10.1)
CHLORIDE SERPL-SCNC: 103 MMOL/L (ref 97–108)
CO2 SERPL-SCNC: 33 MMOL/L (ref 21–32)
CREAT SERPL-MCNC: 0.25 MG/DL (ref 0.55–1.02)
DIFFERENTIAL METHOD BLD: ABNORMAL
EOSINOPHIL # BLD: 0.2 K/UL (ref 0–0.4)
EOSINOPHIL NFR BLD: 2 % (ref 0–7)
ERYTHROCYTE [DISTWIDTH] IN BLOOD BY AUTOMATED COUNT: 15.8 % (ref 11.5–14.5)
GLUCOSE SERPL-MCNC: 123 MG/DL (ref 65–100)
HCT VFR BLD AUTO: 22.5 % (ref 35–47)
HGB BLD-MCNC: 7.1 G/DL (ref 11.5–16)
IMM GRANULOCYTES # BLD AUTO: 0.2 K/UL (ref 0–0.04)
IMM GRANULOCYTES NFR BLD AUTO: 2 % (ref 0–0.5)
LYMPHOCYTES # BLD: 1.2 K/UL (ref 0.8–3.5)
LYMPHOCYTES NFR BLD: 15 % (ref 12–49)
MCH RBC QN AUTO: 27.7 PG (ref 26–34)
MCHC RBC AUTO-ENTMCNC: 31.6 G/DL (ref 30–36.5)
MCV RBC AUTO: 87.9 FL (ref 80–99)
MONOCYTES # BLD: 0.6 K/UL (ref 0–1)
MONOCYTES NFR BLD: 8 % (ref 5–13)
NEUTS SEG # BLD: 6.3 K/UL (ref 1.8–8)
NEUTS SEG NFR BLD: 73 % (ref 32–75)
NRBC # BLD: 0 K/UL (ref 0–0.01)
NRBC BLD-RTO: 0 PER 100 WBC
PLATELET # BLD AUTO: 444 K/UL (ref 150–400)
PMV BLD AUTO: 9.1 FL (ref 8.9–12.9)
POTASSIUM SERPL-SCNC: 2.5 MMOL/L (ref 3.5–5.1)
RBC # BLD AUTO: 2.56 M/UL (ref 3.8–5.2)
SODIUM SERPL-SCNC: 140 MMOL/L (ref 136–145)
WBC # BLD AUTO: 8.5 K/UL (ref 3.6–11)

## 2023-12-25 PROCEDURE — 6360000002 HC RX W HCPCS: Performed by: SURGERY

## 2023-12-25 PROCEDURE — 2580000003 HC RX 258: Performed by: SURGERY

## 2023-12-25 PROCEDURE — 2580000003 HC RX 258: Performed by: NURSE PRACTITIONER

## 2023-12-25 PROCEDURE — 85025 COMPLETE CBC W/AUTO DIFF WBC: CPT

## 2023-12-25 PROCEDURE — 6370000000 HC RX 637 (ALT 250 FOR IP): Performed by: SURGERY

## 2023-12-25 PROCEDURE — 36415 COLL VENOUS BLD VENIPUNCTURE: CPT

## 2023-12-25 PROCEDURE — A4216 STERILE WATER/SALINE, 10 ML: HCPCS | Performed by: NURSE PRACTITIONER

## 2023-12-25 PROCEDURE — 80048 BASIC METABOLIC PNL TOTAL CA: CPT

## 2023-12-25 PROCEDURE — C9113 INJ PANTOPRAZOLE SODIUM, VIA: HCPCS | Performed by: NURSE PRACTITIONER

## 2023-12-25 PROCEDURE — 6360000002 HC RX W HCPCS: Performed by: NURSE PRACTITIONER

## 2023-12-25 PROCEDURE — 1100000000 HC RM PRIVATE

## 2023-12-25 RX ORDER — POTASSIUM CHLORIDE 7.45 MG/ML
10 INJECTION INTRAVENOUS
Status: COMPLETED | OUTPATIENT
Start: 2023-12-25 | End: 2023-12-25

## 2023-12-25 RX ADMIN — POTASSIUM CHLORIDE 10 MEQ: 7.46 INJECTION, SOLUTION INTRAVENOUS at 07:48

## 2023-12-25 RX ADMIN — OXYCODONE HYDROCHLORIDE 10 MG: 5 TABLET ORAL at 09:51

## 2023-12-25 RX ADMIN — OXYCODONE HYDROCHLORIDE 10 MG: 5 TABLET ORAL at 20:15

## 2023-12-25 RX ADMIN — POTASSIUM CHLORIDE 10 MEQ: 7.46 INJECTION, SOLUTION INTRAVENOUS at 09:32

## 2023-12-25 RX ADMIN — CEFEPIME 2000 MG: 2 INJECTION, POWDER, FOR SOLUTION INTRAVENOUS at 02:21

## 2023-12-25 RX ADMIN — POTASSIUM CHLORIDE 10 MEQ: 7.46 INJECTION, SOLUTION INTRAVENOUS at 08:35

## 2023-12-25 RX ADMIN — VANCOMYCIN HYDROCHLORIDE 1500 MG: 1.5 INJECTION, POWDER, LYOPHILIZED, FOR SOLUTION INTRAVENOUS at 11:36

## 2023-12-25 RX ADMIN — METRONIDAZOLE 500 MG: 500 INJECTION, SOLUTION INTRAVENOUS at 08:44

## 2023-12-25 RX ADMIN — CEFEPIME 2000 MG: 2 INJECTION, POWDER, FOR SOLUTION INTRAVENOUS at 10:01

## 2023-12-25 RX ADMIN — PANTOPRAZOLE SODIUM 40 MG: 40 INJECTION, POWDER, FOR SOLUTION INTRAVENOUS at 08:37

## 2023-12-25 RX ADMIN — ENOXAPARIN SODIUM 40 MG: 100 INJECTION SUBCUTANEOUS at 08:37

## 2023-12-25 RX ADMIN — METRONIDAZOLE 500 MG: 500 INJECTION, SOLUTION INTRAVENOUS at 16:37

## 2023-12-25 RX ADMIN — PROCHLORPERAZINE EDISYLATE 10 MG: 5 INJECTION INTRAMUSCULAR; INTRAVENOUS at 04:19

## 2023-12-25 RX ADMIN — CEFEPIME 2000 MG: 2 INJECTION, POWDER, FOR SOLUTION INTRAVENOUS at 16:34

## 2023-12-25 RX ADMIN — OXYCODONE HYDROCHLORIDE 10 MG: 5 TABLET ORAL at 15:01

## 2023-12-25 RX ADMIN — OXYCODONE HYDROCHLORIDE 10 MG: 5 TABLET ORAL at 03:10

## 2023-12-25 RX ADMIN — VANCOMYCIN HYDROCHLORIDE 1500 MG: 1.5 INJECTION, POWDER, LYOPHILIZED, FOR SOLUTION INTRAVENOUS at 04:14

## 2023-12-25 RX ADMIN — VANCOMYCIN HYDROCHLORIDE 1500 MG: 1.5 INJECTION, POWDER, LYOPHILIZED, FOR SOLUTION INTRAVENOUS at 20:14

## 2023-12-25 RX ADMIN — METRONIDAZOLE 500 MG: 500 INJECTION, SOLUTION INTRAVENOUS at 00:53

## 2023-12-25 RX ADMIN — FLUCONAZOLE 400 MG: 2 INJECTION, SOLUTION INTRAVENOUS at 00:56

## 2023-12-25 ASSESSMENT — PAIN SCALES - GENERAL
PAINLEVEL_OUTOF10: 7
PAINLEVEL_OUTOF10: 0
PAINLEVEL_OUTOF10: 8
PAINLEVEL_OUTOF10: 7
PAINLEVEL_OUTOF10: 7

## 2023-12-25 ASSESSMENT — PAIN DESCRIPTION - ORIENTATION
ORIENTATION: ANTERIOR
ORIENTATION: ANTERIOR

## 2023-12-25 ASSESSMENT — PAIN DESCRIPTION - DESCRIPTORS
DESCRIPTORS: ACHING;DULL
DESCRIPTORS: ACHING

## 2023-12-25 ASSESSMENT — PAIN DESCRIPTION - LOCATION
LOCATION: ABDOMEN

## 2023-12-25 NOTE — CARE COORDINATION
12/25/2023 8:35 AM   Care Management Progress Note      ICD-10-CM    1. Postoperative intra-abdominal abscess  T81.43XA       2. Paralytic ileus of small intestine (HCC)  K56.0       3. Bandemia  D72.825       4. Bacteremia  R78.81 Echo (TTE) complete (PRN contrast/bubble/strain/3D)     Echo (TTE) complete (PRN contrast/bubble/strain/3D)      5. Left arm swelling  M79.89 Vascular duplex upper extremity venous left     Vascular duplex upper extremity venous left     CANCELED: Vascular duplex upper extremity arteries left     CANCELED: Vascular duplex upper extremity arteries left        Readmission  RUR: 15%   Risk Level: [x]Low []Moderate []High    Transition of care plan:  Ongoing medical management, advancing diet  Pt with new ostomy, wound care teaching ongoing   Home at discharge with [facility]   Home health, [facility] home [facility] has accepted   Outpatient follow-up. Pt's family to transport.

## 2023-12-25 NOTE — PLAN OF CARE
Problem: Discharge Planning  Goal: Discharge to home or other facility with appropriate resources  Outcome: Progressing     Problem: Pain  Goal: Verbalizes/displays adequate comfort level or baseline comfort level  12/25/2023 0801 by Debbie Merlos RN  Outcome: Progressing  Flowsheets (Taken 12/24/2023 2235)  Verbalizes/displays adequate comfort level or baseline comfort level: Encourage patient to monitor pain and request assistance  12/24/2023 2035 by Messi Aguilera RN  Outcome: Progressing     Problem: Safety - Adult  Goal: Free from fall injury  Outcome: Progressing     Problem: ABCDS Injury Assessment  Goal: Absence of physical injury  Outcome: Progressing     Problem: Skin/Tissue Integrity  Goal: Absence of new skin breakdown  Description: 1. Monitor for areas of redness and/or skin breakdown  2. Assess vascular access sites hourly  3. Every 4-6 hours minimum:  Change oxygen saturation probe site  4. Every 4-6 hours:  If on nasal continuous positive airway pressure, respiratory therapy assess nares and determine need for appliance change or resting period.   Outcome: Progressing     Problem: Nutrition Deficit:  Goal: Optimize nutritional status  Outcome: Progressing

## 2023-12-25 NOTE — PLAN OF CARE
Problem: Discharge Planning  Goal: Discharge to home or other facility with appropriate resources  12/25/2023 1033 by Kirstin Cevallos RN  Outcome: /[facility] Progressing  12/25/2023 0801 by Summer Huynh RN  Outcome: Progressing  Flowsheets  Taken 12/25/2023 0341  Discharge to home or other facility with appropriate resources: Identify barriers to discharge with patient and caregiver  Taken 12/24/2023 1917  Discharge to home or other facility with appropriate resources: Identify barriers to discharge with patient and caregiver     Problem: Pain  Goal: Verbalizes/displays adequate comfort level or baseline comfort level  12/25/2023 1033 by Kirstin Cevallos RN  Outcome: /[facility] Progressing  12/25/2023 0801 by Summer Huynh RN  Outcome: Progressing  Flowsheets (Taken 12/24/2023 2235)  Verbalizes/displays adequate comfort level or baseline comfort level: Encourage patient to monitor pain and request assistance  12/24/2023 2035 by Giselle Montoya RN  Outcome: Progressing     Problem: Safety - Adult  Goal: Free from fall injury  12/25/2023 1033 by Kirstin Cevallos RN  Outcome: /[facility] Progressing  12/25/2023 0801 by Summer Huynh RN  Outcome: Progressing     Problem: ABCDS Injury Assessment  Goal: Absence of physical injury  12/25/2023 1033 by Kirstin Cevallos RN  Outcome: /[facility] Progressing  12/25/2023 0801 by Summer Huynh RN  Outcome: Progressing     Problem: Skin/Tissue Integrity  Goal: Absence of new skin breakdown  Description: 1.  Monitor for areas of redness and/or skin breakdown  2.  Assess vascular access sites hourly  3.  Every 4-6 hours minimum:  Change oxygen saturation probe site  4.  Every 4-6 hours:  If on nasal continuous positive airway pressure, respiratory therapy assess nares and determine need for appliance change or resting period.  12/25/2023 1033 by Kirstin Cevallos RN  Outcome: /[facility] Progressing  12/25/2023 0801 by Summer Huynh RN  Outcome: Progressing     Problem:  Nutrition Deficit:  Goal: Optimize nutritional status  12/25/2023 1033 by Ericka Arguelles RN  Outcome: [facility] Progressing  12/25/2023 0801 by Marlin Cote RN  Outcome: Progressing

## 2023-12-25 NOTE — PROGRESS NOTES
Patient has been getting up with standby assist and walker to the bathroom. Continues on IV abx as ordered with no adverse reactions noted. Patient encouraged to perform adls independent of staff. Potassium level this morning noted at 2.5 and started on potassium replacement. Dressing to abd was changed this morning. Bilateral JOHN drains putting out minimal output. Treated for c/o pain and nausea as needed. No acute distress noted.

## 2023-12-26 LAB
ANION GAP SERPL CALC-SCNC: 4 MMOL/L (ref 5–15)
BUN SERPL-MCNC: 3 MG/DL (ref 6–20)
BUN/CREAT SERPL: 9 (ref 12–20)
CALCIUM SERPL-MCNC: 8 MG/DL (ref 8.5–10.1)
CHLORIDE SERPL-SCNC: 105 MMOL/L (ref 97–108)
CO2 SERPL-SCNC: 30 MMOL/L (ref 21–32)
CREAT SERPL-MCNC: 0.34 MG/DL (ref 0.55–1.02)
GLUCOSE SERPL-MCNC: 102 MG/DL (ref 65–100)
POTASSIUM SERPL-SCNC: 3.1 MMOL/L (ref 3.5–5.1)
SODIUM SERPL-SCNC: 139 MMOL/L (ref 136–145)

## 2023-12-26 PROCEDURE — 6370000000 HC RX 637 (ALT 250 FOR IP): Performed by: SURGERY

## 2023-12-26 PROCEDURE — 6360000002 HC RX W HCPCS: Performed by: SURGERY

## 2023-12-26 PROCEDURE — 94761 N-INVAS EAR/PLS OXIMETRY MLT: CPT

## 2023-12-26 PROCEDURE — 99232 SBSQ HOSP IP/OBS MODERATE 35: CPT | Performed by: INTERNAL MEDICINE

## 2023-12-26 PROCEDURE — C9113 INJ PANTOPRAZOLE SODIUM, VIA: HCPCS | Performed by: NURSE PRACTITIONER

## 2023-12-26 PROCEDURE — 2580000003 HC RX 258: Performed by: INTERNAL MEDICINE

## 2023-12-26 PROCEDURE — 36415 COLL VENOUS BLD VENIPUNCTURE: CPT

## 2023-12-26 PROCEDURE — 2580000003 HC RX 258: Performed by: NURSE PRACTITIONER

## 2023-12-26 PROCEDURE — 1100000000 HC RM PRIVATE

## 2023-12-26 PROCEDURE — 2580000003 HC RX 258: Performed by: SURGERY

## 2023-12-26 PROCEDURE — 6360000002 HC RX W HCPCS: Performed by: INTERNAL MEDICINE

## 2023-12-26 PROCEDURE — 80048 BASIC METABOLIC PNL TOTAL CA: CPT

## 2023-12-26 PROCEDURE — A4216 STERILE WATER/SALINE, 10 ML: HCPCS | Performed by: INTERNAL MEDICINE

## 2023-12-26 PROCEDURE — A4216 STERILE WATER/SALINE, 10 ML: HCPCS | Performed by: NURSE PRACTITIONER

## 2023-12-26 PROCEDURE — 6360000002 HC RX W HCPCS: Performed by: NURSE PRACTITIONER

## 2023-12-26 RX ORDER — POTASSIUM CHLORIDE 750 MG/1
40 TABLET, FILM COATED, EXTENDED RELEASE ORAL DAILY
Status: DISCONTINUED | OUTPATIENT
Start: 2023-12-26 | End: 2023-12-29 | Stop reason: HOSPADM

## 2023-12-26 RX ORDER — PANTOPRAZOLE SODIUM 40 MG/1
40 TABLET, DELAYED RELEASE ORAL
Status: DISCONTINUED | OUTPATIENT
Start: 2023-12-27 | End: 2023-12-29 | Stop reason: HOSPADM

## 2023-12-26 RX ORDER — POLYETHYLENE GLYCOL 3350 17 G/17G
17 POWDER, FOR SOLUTION ORAL DAILY
Status: DISCONTINUED | OUTPATIENT
Start: 2023-12-26 | End: 2023-12-28

## 2023-12-26 RX ORDER — DOCUSATE SODIUM 100 MG/1
100 CAPSULE, LIQUID FILLED ORAL 2 TIMES DAILY
Status: DISCONTINUED | OUTPATIENT
Start: 2023-12-26 | End: 2023-12-29 | Stop reason: HOSPADM

## 2023-12-26 RX ADMIN — DOCUSATE SODIUM 100 MG: 100 CAPSULE, LIQUID FILLED ORAL at 21:24

## 2023-12-26 RX ADMIN — OXYCODONE HYDROCHLORIDE 10 MG: 5 TABLET ORAL at 09:44

## 2023-12-26 RX ADMIN — ENOXAPARIN SODIUM 40 MG: 100 INJECTION SUBCUTANEOUS at 09:44

## 2023-12-26 RX ADMIN — POLYETHYLENE GLYCOL 3350 17 G: 17 POWDER, FOR SOLUTION ORAL at 09:44

## 2023-12-26 RX ADMIN — OXYCODONE HYDROCHLORIDE 10 MG: 5 TABLET ORAL at 17:05

## 2023-12-26 RX ADMIN — ERTAPENEM SODIUM 1000 MG: 1 INJECTION INTRAMUSCULAR; INTRAVENOUS at 13:21

## 2023-12-26 RX ADMIN — DAPTOMYCIN 650 MG: 500 INJECTION, POWDER, LYOPHILIZED, FOR SOLUTION INTRAVENOUS at 13:21

## 2023-12-26 RX ADMIN — OXYCODONE HYDROCHLORIDE 10 MG: 5 TABLET ORAL at 21:23

## 2023-12-26 RX ADMIN — POTASSIUM CHLORIDE 40 MEQ: 750 TABLET, FILM COATED, EXTENDED RELEASE ORAL at 09:45

## 2023-12-26 RX ADMIN — FLUCONAZOLE 400 MG: 2 INJECTION, SOLUTION INTRAVENOUS at 04:36

## 2023-12-26 RX ADMIN — OXYCODONE HYDROCHLORIDE 10 MG: 5 TABLET ORAL at 00:57

## 2023-12-26 RX ADMIN — PANTOPRAZOLE SODIUM 40 MG: 40 INJECTION, POWDER, FOR SOLUTION INTRAVENOUS at 09:44

## 2023-12-26 RX ADMIN — METRONIDAZOLE 500 MG: 500 INJECTION, SOLUTION INTRAVENOUS at 04:29

## 2023-12-26 RX ADMIN — DOCUSATE SODIUM 100 MG: 100 CAPSULE, LIQUID FILLED ORAL at 09:44

## 2023-12-26 RX ADMIN — CEFEPIME 2000 MG: 2 INJECTION, POWDER, FOR SOLUTION INTRAVENOUS at 04:27

## 2023-12-26 RX ADMIN — PROCHLORPERAZINE EDISYLATE 10 MG: 5 INJECTION INTRAMUSCULAR; INTRAVENOUS at 13:22

## 2023-12-26 RX ADMIN — VANCOMYCIN HYDROCHLORIDE 1500 MG: 1.5 INJECTION, POWDER, LYOPHILIZED, FOR SOLUTION INTRAVENOUS at 05:48

## 2023-12-26 RX ADMIN — OXYCODONE HYDROCHLORIDE 10 MG: 5 TABLET ORAL at 05:45

## 2023-12-26 ASSESSMENT — PAIN SCALES - GENERAL
PAINLEVEL_OUTOF10: 7
PAINLEVEL_OUTOF10: 3
PAINLEVEL_OUTOF10: 7

## 2023-12-26 ASSESSMENT — PAIN - FUNCTIONAL ASSESSMENT: PAIN_FUNCTIONAL_ASSESSMENT: ACTIVITIES ARE NOT PREVENTED

## 2023-12-26 ASSESSMENT — PAIN DESCRIPTION - LOCATION
LOCATION: ABDOMEN

## 2023-12-26 ASSESSMENT — PAIN DESCRIPTION - DESCRIPTORS: DESCRIPTORS: ACHING

## 2023-12-26 ASSESSMENT — PAIN DESCRIPTION - ORIENTATION: ORIENTATION: LOWER

## 2023-12-26 NOTE — PROGRESS NOTES
Post Discharge PICC and Antibiotic Orders    1. Diagnosis: Enterococcal bacteremia. Fecal peritonitis. 2.  Antibiotic: Daptomycin 650 mg IV daily through 1/3/2024                        Ertapenem 1 g IV daily through 1/3/2024  3. Routine PICC/ Carlee/ Portacath Care including PRN catheter flow management  4. Weekly labs:   [x] CBC/diff/platelets   [x] BUN/Creatinine   [] CPK   [x] AST/TotalBilirubin/AlkalinePhosphatase   [x] CRP   []Trough Vancomycin level goal 15-20  5. Fax lab to [phone]  Call Critical Lab Values to [phone]  6. May send to IR for line evaluation or replacement [phone]  7. Home Health to pull PIC line at end of therapy or send to IR for Carlee removal  8. Allergies:     Allergies   Allergen Reactions    Guaifenesin Itching    Penicillins Rash     Age 71 Kenzie Guillermo DO

## 2023-12-26 NOTE — PROGRESS NOTES
Pharmacy Dosing Services:  12/26/23    The pharmacist has determined that this patient meets P & T approved criteria for conversion from IV to oral therapy for the following medication: Pantoprazole      The pharmacist has written the following order for the patient: 40mg po daily  The pharmacist will continue to monitor the patient's status and advise the physician if conversion back to IV therapy is recommended.     Signed Lin Walker [facility] Contact information: [phone]

## 2023-12-26 NOTE — WOUND CARE
Ostomy Progress Note:   8th day postoperative visit. Type of Ostomy;colostomy  Location:[facility]  Date of Surgery:12/18  Surgeon:     Treatments:  Pouch removed, stoma and peristomal skin cleaned and assessed, new pouch prepared and applied. Findings:  Current appliance:single  Stoma size:35mm oval  Stoma color:red,   Characteristics:moist, flush to skin  Peristomal skin:clean  Abdomen:soft  Output:liquid brown 100cc    Teaching/subjects covered today:  Encouraged patient to review handout given to patient/family and ask questions regarding material on next ostomy nurse visit. General anatomy and expectations of output  Normal and abnormal stoma characteristics & changes over time. Normal abnormal peristomal characteristics. When/how to clean stoma & peristomal skin. When/how to empty pouch  Crusting technique  When/how to change appliance   When to notify nurse/Physician  Where/how to obtain supplies  Manipulated/practiced with clean pouch and pouch closure  reviewed ADL's(bathing,mattress cover,car pillow to protect from seatbelt,etc.    Patient and  demonstrated understanding of above material      Recommendations:  Empty appliance when 1/3 full and PRN. Encourage patient/family to notify nurse and assist w/ pouch emptying to promote self care. Change appliance twice weekly and PRN for leaking ASAP. DO NOT REINFORCE LEAKS to avoid skin breakdown. Transition of Care:  Ostomy nurse to return and continue teaching. Supplies given to patient with ordering information.

## 2023-12-26 NOTE — PROGRESS NOTES
Physical Therapy    Pt reports recently returning to bed after getting up to BR unassisted using IV pole for steadying. Educated pt to call for assistance with OOB transfers. Pt agreeable, tho declining additional OOB activity at this time 2/2 pain and fatigue. PT will continue to follow.

## 2023-12-26 NOTE — PROGRESS NOTES
Physician Progress Note      PATIENT:               RONALD PICKARD  CSN #:                  [id number]  :                       1970  ADMIT DATE:       2023 2:48 PM  DISCH DATE:  RESPONDING  PROVIDER #:        Francheska Buchanan MD          QUERY TEXT:    Good afternoon.    Pt admitted with feculent peritonitis and underwent open sigmoidectomy on   12/10/23, noted documentation of septic shock in  Critical Care consult.    If possible, please document in progress notes and discharge summary the   relationship if any between the feculent peritonitis and sepsis with septic   shock and the surgery:    The medical record reflects the following:    Risk Factors: 53 yr old female, s/p sigmoidectomy on 10/09/23, contact   dermatitis, eczema    Clinical Indicators: from  Critical care consult: \"Acute problems 1.   Acute resp failure 2. Septic shock 2/2 feculant peritonitis 3. Enterococcal   bacteremia 4. anemia\" and \"53 marcel old woman transferred to ICU post op after   anastomotic leak and feculant peritonitis. Transferred form OR to ICU   intubated and on levoiphed\"; from  Brief Op Note: \"anastomotic leak with   fecal peritonitis\"; WBC: 17.9, 20.7, 17.0, 30.0, 19.0, 16.8; poc Lactic Acid:   2.45, 1.36;  Blood c: Enterococcus faecium;  VS: 07:01--;    VS 22:31-- temp 103, , /87 RR 32;  VS: 23:46--,   BP 88/56;  VS: 00:00--, BP 88/56, RR 25;    Treatment: OR on  for exploratory laparotomy, partial colectomy,   omenectomy, abdominal washout, creation of ostomy, labs, blood cx, ICU level   care, IV Cefepime, Levophed gtt, IV Flagyl, IV Vancomycin, IV Diflucan, IV   Albumin, IV Fluids      Thank you,  Marci Nguyễn RN, CDI  Options provided:  -- Feculent peritonitis and sepsis with septic shock is due to the procedure  -- Feculent peritonitis and sepsis with septic shock is not due to the   procedure, but is due to other incidental  risk factor, Please specify other   incidental risk factor.   -- Other - I will add my own diagnosis  -- Disagree - Not applicable / Not valid  -- Disagree - Clinically unable to determine / Unknown  -- Refer to Clinical Documentation Reviewer    PROVIDER RESPONSE TEXT:    Feculent peritonitis and sepsis with septic shock is not due to the procedure   but due to anastomotic leak from initial procedure    Query created by: Hong Owens on 12/19/2023 5:29 PM      Electronically signed by:  Gloria Regalado MD 12/26/2023 7:29 AM

## 2023-12-26 NOTE — PROGRESS NOTES
VAT note- To acknowledge order for PICC placement for long term antibiotics. Will plan to place PICC closer to discharge. Will follow chart. Please call [phone] for any questions or concerns.

## 2023-12-26 NOTE — PROGRESS NOTES
[facility] Progress Note  Pasha Bermudez DO  [phone] Office  [phone] Fax    2023      Assessment & Plan:     Enterococcus faecium bacteremia due to fecal peritonitis.  Repeat blood cultures NGSF.   Echocardiogram negative for vegetation.  Plan discharge on daptomycin through 1/3 to complete 14-day course of therapy.  Pelvic abscesses following exploratory laparotomy and sigmoid colectomy 12/10/2023 for perforated diverticulitis.  Taken back to the OR  for diagnostic laparotomy due to anastomotic leak with fecal peritonitis.  Status post partial colectomy, omentectomy, and abdominal washout with ostomy creation.  Patient to be discharged on daptomycin for bacteremia.  Will add Invanz to cover for peritonitis  Leukocytosis.  Down overall.  Follow  History of penicillin allergy.  Tolerating cefepime  Seizure disorder.  Monitor closely on antibiotics as antibiotics may lower the seizure threshold            Subjective:     No new complaints    Objective:     Vitals: /68   Pulse 88   Temp 98.2 °F (36.8 °C) (Oral)   Resp 17   Ht 1.67 m (5' 5.75\")   Wt 114.8 kg (253 lb)   SpO2 95%   BMI 41.15 kg/m²      Tmax:  Temp (24hrs), Av.8 °F (37.1 °C), Min:98.2 °F (36.8 °C), Max:99.3 °F (37.4 °C)      Exam:   Patient is intubated:  no    Physical Examination:   General:  Alert, cooperative, no distress   Head:  Normocephalic, atraumatic..  NGT   Eyes:  Conjunctivae clear   Neck: Supple       Lungs:   No distress.    Chest wall:     Heart:     Abdomen:   non-distended.  Drains in place   Extremities: Moves all.     Skin: No acute rash on exposed skin   Neurologic: CNII-XII intact. Normal strength     Labs:        Invalid input(s): \"ITNL\"   No results for input(s): \"CPK\", \"CKMB\" in the last 72 hours.    Invalid input(s): \"TROIQ\"  Recent Labs     23  0429 23  0350    139   K 2.5* 3.1*    105   CO2 33* 30   BUN 2* 3*   WBC 8.5  --    HGB 7.1*  --   HCT 22.5*  --    *  --        No results for input(s): \"INR\", \"APTT\" in the last 72 hours.     Invalid input(s): \"PTP\"  Needs: urine analysis, urine sodium, protein and creatinine  No results found for: \"NORA\", \"KU\", \"CREAU\"      Cultures:     No results found for: \"SDES\"  No components found for: \"CULT\"    Radiology:     Medications       @[facility]@        Case discussed with: Surgery      Tessa Renteria DO

## 2023-12-27 ENCOUNTER — APPOINTMENT (OUTPATIENT)
Facility: HOSPITAL | Age: 53
DRG: 856 | End: 2023-12-27
Attending: INTERNAL MEDICINE
Payer: COMMERCIAL

## 2023-12-27 LAB
ANION GAP SERPL CALC-SCNC: 3 MMOL/L (ref 5–15)
BUN SERPL-MCNC: 4 MG/DL (ref 6–20)
BUN/CREAT SERPL: 14 (ref 12–20)
CALCIUM SERPL-MCNC: 8 MG/DL (ref 8.5–10.1)
CHLORIDE SERPL-SCNC: 106 MMOL/L (ref 97–108)
CK SERPL-CCNC: 54 U/L (ref 26–192)
CO2 SERPL-SCNC: 31 MMOL/L (ref 21–32)
COMMENT:: NORMAL
CREAT SERPL-MCNC: 0.29 MG/DL (ref 0.55–1.02)
GLUCOSE SERPL-MCNC: 115 MG/DL (ref 65–100)
POTASSIUM SERPL-SCNC: 3.3 MMOL/L (ref 3.5–5.1)
SODIUM SERPL-SCNC: 140 MMOL/L (ref 136–145)
SPECIMEN HOLD: NORMAL

## 2023-12-27 PROCEDURE — 82550 ASSAY OF CK (CPK): CPT

## 2023-12-27 PROCEDURE — 2580000003 HC RX 258: Performed by: NURSE PRACTITIONER

## 2023-12-27 PROCEDURE — 97530 THERAPEUTIC ACTIVITIES: CPT

## 2023-12-27 PROCEDURE — APPNB30 APP NON BILLABLE TIME 0-30 MINS: Performed by: NURSE PRACTITIONER

## 2023-12-27 PROCEDURE — 97535 SELF CARE MNGMENT TRAINING: CPT

## 2023-12-27 PROCEDURE — 6370000000 HC RX 637 (ALT 250 FOR IP): Performed by: SURGERY

## 2023-12-27 PROCEDURE — 6370000000 HC RX 637 (ALT 250 FOR IP): Performed by: INTERNAL MEDICINE

## 2023-12-27 PROCEDURE — 97116 GAIT TRAINING THERAPY: CPT

## 2023-12-27 PROCEDURE — 6360000002 HC RX W HCPCS: Performed by: NURSE PRACTITIONER

## 2023-12-27 PROCEDURE — 6360000002 HC RX W HCPCS: Performed by: SURGERY

## 2023-12-27 PROCEDURE — 6360000002 HC RX W HCPCS: Performed by: INTERNAL MEDICINE

## 2023-12-27 PROCEDURE — 36415 COLL VENOUS BLD VENIPUNCTURE: CPT

## 2023-12-27 PROCEDURE — 80048 BASIC METABOLIC PNL TOTAL CA: CPT

## 2023-12-27 PROCEDURE — 94761 N-INVAS EAR/PLS OXIMETRY MLT: CPT

## 2023-12-27 PROCEDURE — 36573 INSJ PICC RS&I 5 YR+: CPT

## 2023-12-27 PROCEDURE — 6370000000 HC RX 637 (ALT 250 FOR IP): Performed by: NURSE PRACTITIONER

## 2023-12-27 PROCEDURE — 2580000003 HC RX 258: Performed by: INTERNAL MEDICINE

## 2023-12-27 PROCEDURE — 1100000000 HC RM PRIVATE

## 2023-12-27 PROCEDURE — A4216 STERILE WATER/SALINE, 10 ML: HCPCS | Performed by: INTERNAL MEDICINE

## 2023-12-27 RX ORDER — METRONIDAZOLE 250 MG/1
500 TABLET ORAL EVERY 8 HOURS SCHEDULED
Status: DISCONTINUED | OUTPATIENT
Start: 2023-12-27 | End: 2023-12-29 | Stop reason: HOSPADM

## 2023-12-27 RX ORDER — SENNOSIDES A AND B 8.6 MG/1
1 TABLET, FILM COATED ORAL NIGHTLY
Status: DISCONTINUED | OUTPATIENT
Start: 2023-12-27 | End: 2023-12-27 | Stop reason: SDUPTHER

## 2023-12-27 RX ORDER — SENNOSIDES A AND B 8.6 MG/1
1 TABLET, FILM COATED ORAL NIGHTLY
Status: DISCONTINUED | OUTPATIENT
Start: 2023-12-27 | End: 2023-12-29 | Stop reason: HOSPADM

## 2023-12-27 RX ADMIN — SENNOSIDES 8.6 MG: 8.6 TABLET, FILM COATED ORAL at 21:45

## 2023-12-27 RX ADMIN — POTASSIUM CHLORIDE 40 MEQ: 750 TABLET, FILM COATED, EXTENDED RELEASE ORAL at 11:42

## 2023-12-27 RX ADMIN — POLYETHYLENE GLYCOL 3350 17 G: 17 POWDER, FOR SOLUTION ORAL at 11:42

## 2023-12-27 RX ADMIN — OXYCODONE HYDROCHLORIDE 10 MG: 5 TABLET ORAL at 06:03

## 2023-12-27 RX ADMIN — DOCUSATE SODIUM 100 MG: 100 CAPSULE, LIQUID FILLED ORAL at 19:47

## 2023-12-27 RX ADMIN — ERTAPENEM SODIUM 1000 MG: 1 INJECTION INTRAMUSCULAR; INTRAVENOUS at 15:48

## 2023-12-27 RX ADMIN — ENOXAPARIN SODIUM 40 MG: 100 INJECTION SUBCUTANEOUS at 11:42

## 2023-12-27 RX ADMIN — OXYCODONE HYDROCHLORIDE 10 MG: 5 TABLET ORAL at 15:36

## 2023-12-27 RX ADMIN — PANTOPRAZOLE SODIUM 40 MG: 40 TABLET, DELAYED RELEASE ORAL at 06:04

## 2023-12-27 RX ADMIN — DOCUSATE SODIUM 100 MG: 100 CAPSULE, LIQUID FILLED ORAL at 11:42

## 2023-12-27 RX ADMIN — OXYCODONE HYDROCHLORIDE 10 MG: 5 TABLET ORAL at 11:42

## 2023-12-27 RX ADMIN — WATER 2000 MG: 1 INJECTION INTRAMUSCULAR; INTRAVENOUS; SUBCUTANEOUS at 15:41

## 2023-12-27 RX ADMIN — DAPTOMYCIN 650 MG: 500 INJECTION, POWDER, LYOPHILIZED, FOR SOLUTION INTRAVENOUS at 15:39

## 2023-12-27 RX ADMIN — METRONIDAZOLE 500 MG: 250 TABLET ORAL at 21:45

## 2023-12-27 RX ADMIN — OXYCODONE HYDROCHLORIDE 10 MG: 5 TABLET ORAL at 19:47

## 2023-12-27 RX ADMIN — FLUCONAZOLE 400 MG: 2 INJECTION, SOLUTION INTRAVENOUS at 01:36

## 2023-12-27 RX ADMIN — OXYCODONE HYDROCHLORIDE 10 MG: 5 TABLET ORAL at 01:33

## 2023-12-27 ASSESSMENT — PAIN DESCRIPTION - LOCATION
LOCATION: ABDOMEN

## 2023-12-27 ASSESSMENT — PAIN DESCRIPTION - ORIENTATION: ORIENTATION: MID

## 2023-12-27 ASSESSMENT — PAIN DESCRIPTION - DESCRIPTORS
DESCRIPTORS: DULL
DESCRIPTORS: DULL
DESCRIPTORS: CRAMPING

## 2023-12-27 ASSESSMENT — PAIN SCALES - GENERAL
PAINLEVEL_OUTOF10: 6
PAINLEVEL_OUTOF10: 7

## 2023-12-27 NOTE — CARE COORDINATION
12/27/2023  1:17 PM  Care Management Progress Note      ICD-10-CM    1. Postoperative intra-abdominal abscess  T81.43XA       2. Paralytic ileus of small intestine (HCC)  K56.0       3. Bandemia  D72.825       4. Bacteremia  R78.81 Echo (TTE) complete (PRN contrast/bubble/strain/3D)     Echo (TTE) complete (PRN contrast/bubble/strain/3D)      5. Left arm swelling  M79.89 Vascular duplex upper extremity venous left     Vascular duplex upper extremity venous left     CANCELED: Vascular duplex upper extremity arteries left     CANCELED: Vascular duplex upper extremity arteries left          RUR:  14%  Risk Level: [x]Low []Moderate []High    Transition of care plan:  Awaiting medical clearance and DC order. PT/OT treating. ID following. Awaiting ostomy output. PICC placed today. Home with HH and IV abx -  provided update to [address] and they are able to provide both SN ostomy care and IV abx. [facility] priced pt's IV abx at 100%. Pt notified and indicated [address] as preference. [facility] will come on day of discharge to provide teaching and to deliver medications. Outpatient follow-up. Pt's family to transport.

## 2023-12-27 NOTE — PROGRESS NOTES
Nutrition Education  Recommendations:   Continue to monitor and replace low Potassium   Check Phosphorus and replace PRN  Obtain measured weight & document    Brief:   Educated on Ostomy Diet   Learners: Patient  Readiness: Eager  Method: Explanation, Handout, and Teachback  Response: Verbalizes Understanding  Contact name and number provided. Follow up assessment of patient. She is doing well and diet advanced. She has questions regarding diet parameters. Education completed. RD changed diet to include Low Fiber. Pt would like to continue current ONS ordered, Ensure Clear twice daily. Not much output overnight and this morning. Upon further interview, pt admits she hasn't eaten very much. She is reminded to drink plenty of water and why hydration will be important. Handout added to discharge instructions. ADULT ORAL NUTRITION SUPPLEMENT; Lunch; Protein Modular  DIET ONE TIME MESSAGE;  ADULT DIET;  Regular; Low Fiber; obtain meal orders  ADULT ORAL NUTRITION SUPPLEMENT; Dinner, Breakfast; Clear Liquid Oral Supplement      Mahendra Todd RD, MS  Contact via [employer] or office [phone]

## 2023-12-27 NOTE — WOUND CARE
Ostomy follow up:  Patient stated she walked down the read yesterday and will ambulate more today. Ostomy bag- no output since yesterday appliance change.  unable to arrive until 3:30 pm. Will return for any questions that need to be answered. Jo Ann Pickens RN    Addendum- returned and  unable to come in until later, no current questions at this time. Possible discharge tomorrow. Plan;   Appliance change tomorrow by  without assist.  Jo Ann Pickens RN

## 2023-12-27 NOTE — PROGRESS NOTES
Patient does not have IV access at this time. Her plan is for PICC today and home with IV abx. She will need abx ~ 1300. If possible can she be sooner on the list? IV was attempted multiple times and unsuccessful.       Message sent to PICC team

## 2023-12-27 NOTE — PLAN OF CARE
Problem: Occupational Therapy - Adult  Goal: By Discharge: Performs self-care activities at highest level of function for planned discharge setting. See evaluation for individualized goals. Description: FUNCTIONAL STATUS PRIOR TO ADMISSION:  Pt normally independent, active, and working. Had initial abdominal surgery 1 week ago and had discharged home on 12/14/23, then re-admitted 12/16/23. Since initial surgery had been using RW for mobility and requiring supervision/set-up for ADLs. , ADL Assistance: Independent,  ,  ,  ,  ,  , Homemaking Assistance: Independent, Ambulation Assistance: Independent, Transfer Assistance: Independent, Active : Yes     HOME SUPPORT: Patient lived with her  and 2 teenage sons. Occupational Therapy Goals:  Initiated 12/20/2023; Goals reviewed 12/27 - ALL MET. 1.  Patient will perform grooming, standing at sink, with Modified [place] within 7 day(s). 2.  Patient will perform lower body dressing with Modified [place] using AE PRN within 7 day(s). 3.  Patient will perform bathing, sitting and standing PRN, with Supervision within 7 day(s). 4.  Patient will perform toilet transfers with Modified [place]  within 7 day(s). 5.  Patient will perform all aspects of toileting with Modified [place] using AE PRN within 7 day(s). 6.  Patient will participate in upper extremity therapeutic exercise/activities with [place] for 10 minutes within 7 day(s).    Outcome: Completed      OCCUPATIONAL THERAPY WEEKLY RE-ASSESSMENT with DISCHARGE  Patient: Michele Rico (23 y.o. female)  Date: 12/27/2023  Primary Diagnosis: Postoperative intra-abdominal abscess [T81.43XA]  Abscess [L02.91]  Bandemia [D72.825]  Paralytic ileus of small intestine (HCC) [K56.0]  Procedure(s) (LRB):  EXPLORATORY LAPAROTOMY, PARTIAL COLECTOMY, OMENECTOMY, ABDOMINAL WASHOUT, CREATION OF OSTOMY (N/A) 9 Days Post-Op   Precautions:                    Chart, occupational therapy assessment, plan of care, and goals were reviewed.    ASSESSMENT  Patient continues with skilled OT services and has progressed towards goals.  Patient is performing ADLs at overall set-up/supervision to MOD I level and has been up ad ashwin in room for past few days while in acute setting. She endorses difficulty with posterior hygiene due to reach and today is educated on use of AE for toileting hygiene. Pt performs mobility to bathroom with good technique with RW and completes all bathroom ADLs with mod I, including standing at sink for grooming tasks. Pt participates in AE training with  AE for LB dressing, and demonstrates excellent carry-over and use of reacher and sock aid this session. All questions answered during session and pt receptive to all additional instruction on safety with ADLs upon transition home. She endorses good progress with ongoing ostomy teaching with  and wound care RN and is able to vocalize good use of abdominal binder for prolonged activity. All goals met at this time. No further skilled acute OT services indicated. Recommend home with family support at discharge.         PLAN :  Discharge from acute OT    Recommendation for discharge: (in order for the patient to meet his/her long term goals): No skilled occupational therapy    IF patient discharges home will need the following DME:  AE (pt reports purchasing  AE prior to this readmission)     SUBJECTIVE:   Patient stated “I bought the kit but I hadn't had a chance to use it when I was home.”    OBJECTIVE DATA SUMMARY:     Cognitive/Behavioral Status:  Orientation  Overall Orientation Status: Within Normal Limits  Orientation Level: Oriented X4  Cognition  Overall Cognitive Status: WNL    Functional Mobility and Transfers for ADLs:  Bed Mobility:  Bed Mobility Training  Bed Mobility Training: Yes  Overall Level of Assistance: Modified independent  Rolling: Modified independent (with bed rails)  Supine to Sit: Modified

## 2023-12-27 NOTE — PLAN OF CARE
Problem: Physical Therapy - Adult  Goal: By Discharge: Performs mobility at highest level of function for planned discharge setting.  See evaluation for individualized goals.  Description: FUNCTIONAL STATUS PRIOR TO ADMISSION: Patient was modified independent using a rolling walker for functional mobility.    HOME SUPPORT PRIOR TO ADMISSION: The patient lived with family but did not require assistance.    Physical Therapy Goals   Initiated 12/20/2023  1.  Patient will move from supine to sit and sit to supine, scoot up and down, and roll side to side in bed with modified independence within 7 day(s).    2.  Patient will perform sit to stand with modified independence within 7 day(s).  3.  Patient will transfer from bed to chair and chair to bed with modified independence using the least restrictive device within 7 day(s).  4.  Patient will ambulate with modified independence for 200 feet with the least restrictive device within 7 day(s).     Outcome: Adequate for Discharge   PHYSICAL THERAPY TREATMENT/DISCHARGE    Patient: Pily Howell (53 y.o. female)  Date: 12/27/2023  Diagnosis: Postoperative intra-abdominal abscess [T81.43XA]  Abscess [L02.91]  Bandemia [D72.825]  Paralytic ileus of small intestine (HCC) [K56.0] Abscess  Procedure(s) (LRB):  EXPLORATORY LAPAROTOMY, PARTIAL COLECTOMY, OMENECTOMY, ABDOMINAL WASHOUT, CREATION OF OSTOMY (N/A) 9 Days Post-Op  Precautions: Up Ad Ashwin (abdominal binder for ambulation)                    ASSESSMENT:  Patient has been followed by skilled PT services and has progressed towards goals. Patient this afternoon with good participation and tolerance to physical therapy session. Patient demonstrates modified independent gait both with and without RW and is able to manage her own IV pole without issue during session. She ambulates >100 ft down hallway with RW support and reports being up ad ashwin with spouse ambulating down the hallway as well. Patient has reached all of her  goals at this time and is d/c'd from acute care PT. Recommend HH follow up upon d/c. PLAN:  Maximum therapeutic benefit has been met at current level of care and patient will be discharged from physical therapy at this time. Rationale for discharge:  Goals achieved    Recommendation for discharge: (in order for the patient to meet his/her long term goals): Therapy 2 days/week in the home    Other factors to consider for discharge:  receiving HHRN for antibiotics    IF patient discharges home will need the following DME: patient owns DME required for discharge     SUBJECTIVE:   Patient stated, \"It's because of my vertigo. \" Re: Patient preferring use of RW over no AD when offered     OBJECTIVE DATA SUMMARY:   Patient received supine in bed and was agreeable to participate in PT session. Patient was cleared by nursing to participate in PT session. Critical Behavior:  Orientation  Overall Orientation Status: Within Normal Limits  Orientation Level: Oriented X4  Cognition  Overall Cognitive Status: WNL    Functional Mobility Training:  Bed Mobility:  Bed Mobility Training  Bed Mobility Training: Yes  Overall Level of Assistance: Independent  Rolling: Independent  Supine to Sit: Independent  Sit to Supine: Independent  Scooting: Independent  Transfers:  Transfer Training  Transfer Training: Yes  Sit to Stand: Modified independent  Stand to Sit: Modified independent  Bed to Chair: Modified independent (with RW)  Toilet Transfer: Modified independent (with grab bars)  Balance:  Balance  Sitting: Intact  Standing: With support  Standing - Static: Good  Standing - Dynamic: Good   Ambulation/Gait Training:     Gait  Overall Level of Assistance: Modified independent  Distance (ft): 150 Feet  Assistive Device: Walker, rolling;Gait belt                Pain Ratin/10   Pain Intervention(s):        Activity Tolerance:   Good and tolerates ADLS without rest breaks    After treatment:   Patient left in no apparent

## 2023-12-27 NOTE — DISCHARGE INSTR - DIET
Good nutrition is important when healing from an illness, injury, or surgery. Follow any nutrition recommendations given to you during your hospital stay. If you were given an oral nutrition supplement while in the hospital, continue to take this supplement at home. You can take it with meals, in-between meals, and/or before bedtime. These supplements can be purchased at most local grocery stores, pharmacies, and chain [employer]-stores. If you have any questions about your diet or nutrition, call the hospital and ask for the dietitian. Nutrition Recommendations For Discharge:    Continue Oral Nutrition Supplements at discharge:  Ensure Active Clear Protein Drink or similar product  Once daily for 30 days unless otherwise directed by your Primary Care Physician. This product can be purchased at your local grocery store, pharmacy and/or online.     Donnie Paz RD, MS  Office Number: [phone]

## 2023-12-28 LAB
ANION GAP SERPL CALC-SCNC: 2 MMOL/L (ref 5–15)
BACTERIA SPEC CULT: NORMAL
BACTERIA SPEC CULT: NORMAL
BUN SERPL-MCNC: 3 MG/DL (ref 6–20)
BUN/CREAT SERPL: 8 (ref 12–20)
CALCIUM SERPL-MCNC: 8.4 MG/DL (ref 8.5–10.1)
CHLORIDE SERPL-SCNC: 104 MMOL/L (ref 97–108)
CO2 SERPL-SCNC: 31 MMOL/L (ref 21–32)
CREAT SERPL-MCNC: 0.37 MG/DL (ref 0.55–1.02)
GLUCOSE SERPL-MCNC: 118 MG/DL (ref 65–100)
POTASSIUM SERPL-SCNC: 3.6 MMOL/L (ref 3.5–5.1)
SERVICE CMNT-IMP: NORMAL
SERVICE CMNT-IMP: NORMAL
SODIUM SERPL-SCNC: 137 MMOL/L (ref 136–145)

## 2023-12-28 PROCEDURE — 6370000000 HC RX 637 (ALT 250 FOR IP): Performed by: NURSE PRACTITIONER

## 2023-12-28 PROCEDURE — 2580000003 HC RX 258: Performed by: INTERNAL MEDICINE

## 2023-12-28 PROCEDURE — 6370000000 HC RX 637 (ALT 250 FOR IP): Performed by: SURGERY

## 2023-12-28 PROCEDURE — A4216 STERILE WATER/SALINE, 10 ML: HCPCS | Performed by: INTERNAL MEDICINE

## 2023-12-28 PROCEDURE — 1100000000 HC RM PRIVATE

## 2023-12-28 PROCEDURE — 94761 N-INVAS EAR/PLS OXIMETRY MLT: CPT

## 2023-12-28 PROCEDURE — 80048 BASIC METABOLIC PNL TOTAL CA: CPT

## 2023-12-28 PROCEDURE — 36415 COLL VENOUS BLD VENIPUNCTURE: CPT

## 2023-12-28 PROCEDURE — 6370000000 HC RX 637 (ALT 250 FOR IP): Performed by: INTERNAL MEDICINE

## 2023-12-28 PROCEDURE — 2580000003 HC RX 258: Performed by: NURSE PRACTITIONER

## 2023-12-28 PROCEDURE — 6360000002 HC RX W HCPCS: Performed by: INTERNAL MEDICINE

## 2023-12-28 PROCEDURE — 6360000002 HC RX W HCPCS: Performed by: NURSE PRACTITIONER

## 2023-12-28 PROCEDURE — APPNB15 APP NON BILLABLE TIME 0-15 MINS: Performed by: NURSE PRACTITIONER

## 2023-12-28 RX ORDER — POLYETHYLENE GLYCOL 3350 17 G/17G
17 POWDER, FOR SOLUTION ORAL 2 TIMES DAILY
Status: DISCONTINUED | OUTPATIENT
Start: 2023-12-28 | End: 2023-12-29 | Stop reason: HOSPADM

## 2023-12-28 RX ORDER — ENOXAPARIN SODIUM 100 MG/ML
30 INJECTION SUBCUTANEOUS 2 TIMES DAILY
Status: DISCONTINUED | OUTPATIENT
Start: 2023-12-28 | End: 2023-12-29 | Stop reason: HOSPADM

## 2023-12-28 RX ADMIN — OXYCODONE HYDROCHLORIDE 10 MG: 5 TABLET ORAL at 20:23

## 2023-12-28 RX ADMIN — ENOXAPARIN SODIUM 30 MG: 100 INJECTION SUBCUTANEOUS at 20:28

## 2023-12-28 RX ADMIN — SENNOSIDES 8.6 MG: 8.6 TABLET, FILM COATED ORAL at 20:23

## 2023-12-28 RX ADMIN — DAPTOMYCIN 650 MG: 500 INJECTION, POWDER, LYOPHILIZED, FOR SOLUTION INTRAVENOUS at 16:14

## 2023-12-28 RX ADMIN — DOCUSATE SODIUM 100 MG: 100 CAPSULE, LIQUID FILLED ORAL at 20:23

## 2023-12-28 RX ADMIN — OXYCODONE HYDROCHLORIDE 10 MG: 5 TABLET ORAL at 05:38

## 2023-12-28 RX ADMIN — POLYETHYLENE GLYCOL 3350 17 G: 17 POWDER, FOR SOLUTION ORAL at 09:58

## 2023-12-28 RX ADMIN — ERTAPENEM SODIUM 1000 MG: 1 INJECTION INTRAMUSCULAR; INTRAVENOUS at 14:29

## 2023-12-28 RX ADMIN — WATER 2000 MG: 1 INJECTION INTRAMUSCULAR; INTRAVENOUS; SUBCUTANEOUS at 14:20

## 2023-12-28 RX ADMIN — DOCUSATE SODIUM 100 MG: 100 CAPSULE, LIQUID FILLED ORAL at 09:58

## 2023-12-28 RX ADMIN — OXYCODONE HYDROCHLORIDE 10 MG: 5 TABLET ORAL at 11:16

## 2023-12-28 RX ADMIN — METRONIDAZOLE 500 MG: 250 TABLET ORAL at 05:38

## 2023-12-28 RX ADMIN — POLYETHYLENE GLYCOL 3350 17 G: 17 POWDER, FOR SOLUTION ORAL at 20:24

## 2023-12-28 RX ADMIN — METRONIDAZOLE 500 MG: 250 TABLET ORAL at 14:20

## 2023-12-28 RX ADMIN — ENOXAPARIN SODIUM 40 MG: 100 INJECTION SUBCUTANEOUS at 09:58

## 2023-12-28 RX ADMIN — OXYCODONE HYDROCHLORIDE 10 MG: 5 TABLET ORAL at 01:18

## 2023-12-28 RX ADMIN — METRONIDAZOLE 500 MG: 250 TABLET ORAL at 20:23

## 2023-12-28 RX ADMIN — OXYCODONE HYDROCHLORIDE 10 MG: 5 TABLET ORAL at 16:13

## 2023-12-28 RX ADMIN — POTASSIUM CHLORIDE 40 MEQ: 750 TABLET, FILM COATED, EXTENDED RELEASE ORAL at 09:57

## 2023-12-28 RX ADMIN — PANTOPRAZOLE SODIUM 40 MG: 40 TABLET, DELAYED RELEASE ORAL at 05:38

## 2023-12-28 ASSESSMENT — PAIN DESCRIPTION - DESCRIPTORS
DESCRIPTORS: THROBBING
DESCRIPTORS: THROBBING
DESCRIPTORS: CRAMPING
DESCRIPTORS: ACHING
DESCRIPTORS: DULL

## 2023-12-28 ASSESSMENT — PAIN SCALES - GENERAL
PAINLEVEL_OUTOF10: 7
PAINLEVEL_OUTOF10: 3
PAINLEVEL_OUTOF10: 2
PAINLEVEL_OUTOF10: 8
PAINLEVEL_OUTOF10: 8
PAINLEVEL_OUTOF10: 7
PAINLEVEL_OUTOF10: 7

## 2023-12-28 ASSESSMENT — PAIN DESCRIPTION - ORIENTATION
ORIENTATION: ANTERIOR
ORIENTATION: MID
ORIENTATION: MID
ORIENTATION: ANTERIOR;MID

## 2023-12-28 ASSESSMENT — PAIN DESCRIPTION - LOCATION
LOCATION: ABDOMEN;INCISION
LOCATION: ABDOMEN
LOCATION: ABDOMEN;INCISION
LOCATION: ABDOMEN
LOCATION: ABDOMEN

## 2023-12-28 ASSESSMENT — PAIN SCALES - WONG BAKER
WONGBAKER_NUMERICALRESPONSE: 2
WONGBAKER_NUMERICALRESPONSE: 2

## 2023-12-28 NOTE — PLAN OF CARE
Problem: Discharge Planning  Goal: Discharge to home or other facility with appropriate resources  Outcome: [address] [facility] [address] Progressing     Problem: Pain  Goal: Verbalizes/displays adequate comfort level or baseline comfort level  12/28/2023 1159 by Leidy Martel RN  Outcome: [address] [place] [address] Progressing  12/27/2023 2321 by Demetrio Cranker, RN  Outcome: Progressing     Problem: Safety - Adult  Goal: Free from fall injury  12/28/2023 1159 by Leidy Martel RN  Outcome: [address] [address] Progressing  12/27/2023 2321 by Demetrio Cranker, RN  Outcome: Progressing     Problem: ABCDS Injury Assessment  Goal: Absence of physical injury  Outcome: [address] [address] Progressing     Problem: Skin/Tissue Integrity  Goal: Absence of new skin breakdown  Description: 1. Monitor for areas of redness and/or skin breakdown  2. Assess vascular access sites hourly  3. Every 4-6 hours minimum:  Change oxygen saturation probe site  4. Every 4-6 hours:  If on nasal continuous positive airway pressure, respiratory therapy assess nares and determine need for appliance change or resting period.   12/28/2023 1159 by Leidy Martel RN  Outcome: [address] [address] Progressing  12/27/2023 2321 by Demetrio Cranker, RN  Outcome: Progressing     Problem: Nutrition Deficit:  Goal: Optimize nutritional status  Outcome: [address] [facility] 114 Progressing

## 2023-12-28 NOTE — PROGRESS NOTES
GENERAL SURGERY PROGRESS NOTE     Admit Date: 2023  Post-Operative Day: 10 Days Post-Op from Procedure(s):  EXPLORATORY LAPAROTOMY, PARTIAL COLECTOMY, OMENECTOMY, ABDOMINAL WASHOUT, CREATION OF OSTOMY     Subjective:     Patient reports she is feeling better everyday. No  nausea, no vomiting, no chest pain, no shortness of breath. Improvement with abdominal pain. Voiding independently, up out of bed. Tolerating diet and use of nutrition supplementation.. Ostomy without any output. Patient reports she does feeling rumbles of gas.       Objective:     Blood pressure 123/61, pulse 90, temperature 98.6 °F (37 °C), temperature source Oral, resp. rate 18, height 1.67 m (5' 5.75\"), weight 114.8 kg (253 lb), SpO2 96 %.  Temp (24hrs), Av.4 °F (36.9 °C), Min:97.7 °F (36.5 °C), Max:98.8 °F (37.1 °C)      _____________________  Physical Exam:     Alert and Oriented, no acute distress.  Abdomen: soft, generalized tenderness, non-distended, midline staples with dressing dry and intact, left and right lower abdomen JOHN drain with minimal output with light brown drainage, bowel sounds present, left abdomen ostomy with no output.      Assessment:   Principal Problem:    Abscess  Active Problems:    Bacteremia    Postoperative intra-abdominal abscess    Penicillin allergy    Seizure disorder (HCC)    Enterococcal bacteremia    Leukocytosis  Resolved Problems:    * No resolved hospital problems. *      Plan:   -Awaiting for ostomy to have more output before discharging patient to home. Continue regime of Miralax, Colace, Senna. Increase Miralax to twice daily.  -Encourage PO intake and ambulation. Patient to be up out of bed more frequently walking hallways   -Continue IV antibiotics per ID. ID following for plans of IV antibiotics post discharge  -Case management following   -Continue to monitor output of JOHN drains   -Further plans per Dr. Buchanan       Data Review:    No results for input(s): \"WBC\", \"HGB\", \"HCT\", \"PLT\" in  the last 72 hours. Recent Labs     12/26/23  0350 12/27/23  0355    140   K 3.1* 3.3*    106   CO2 30 31   BUN 3* 4*     No results for input(s): \"AML\" in the last 72 hours.     Invalid input(s): \"LPSE\"        ______________________  Medications:    Current Facility-Administered Medications   Medication Dose Route Frequency    polyethylene glycol (GLYCOLAX) packet 17 g  17 g Oral BID    alteplase (CATHFLO) 1 mg in sterile water 1 mL injection  1 mg IntraCATHeter PRN    ertapenem (INVanz) 1,000 mg in sodium chloride 0.9 % 50 mL IVPB (mini-bag)  1,000 mg IntraVENous Q24H    cefTRIAXone (ROCEPHIN) 2,000 mg in sterile water 20 mL IV syringe  2,000 mg IntraVENous Q24H    metroNIDAZOLE (FLAGYL) tablet 500 mg  500 mg Oral 3 times per day    senna (SENOKOT) tablet 8.6 mg  1 tablet Oral Nightly    potassium chloride (KLOR-CON) extended release tablet 40 mEq  40 mEq Oral Daily    docusate sodium (COLACE) capsule 100 mg  100 mg Oral BID    DAPTOmycin (CUBICIN) 650 mg in sodium chloride (PF) 0.9 % 13 mL IV syringe  8 mg/kg (Adjusted) IntraVENous Q24H    pantoprazole (PROTONIX) tablet 40 mg  40 mg Oral QAM AC    enoxaparin (LOVENOX) injection 40 mg  40 mg SubCUTAneous Daily    HYDROmorphone HCl PF (DILAUDID) injection 1 mg  1 mg IntraVENous Q3H PRN    HYDROmorphone (DILAUDID) injection 0.5 mg  0.5 mg IntraVENous Q4H PRN    0.9 % sodium chloride infusion   IntraVENous PRN    prochlorperazine (COMPAZINE) injection 10 mg  10 mg IntraVENous Q6H PRN    oxyCODONE (ROXICODONE) immediate release tablet 5 mg  5 mg Oral Q4H PRN    oxyCODONE (ROXICODONE) immediate release tablet 10 mg  10 mg Oral Q4H PRN    acetaminophen (TYLENOL) tablet 650 mg  650 mg Oral Q4H PRN       ESVIN Wilson - CNP  12/28/2023

## 2023-12-28 NOTE — CARE COORDINATION
12/28/2023  2:45 PM  Care Management Progress Note      ICD-10-CM    1. Postoperative intra-abdominal abscess  T81.43XA       2. Paralytic ileus of small intestine (HCC)  K56.0       3. Bandemia  D72.825       4. Bacteremia  R78.81 Echo (TTE) complete (PRN contrast/bubble/strain/3D)     Echo (TTE) complete (PRN contrast/bubble/strain/3D)      5. Left arm swelling  M79.89 Vascular duplex upper extremity venous left     Vascular duplex upper extremity venous left     CANCELED: Vascular duplex upper extremity arteries left     CANCELED: Vascular duplex upper extremity arteries left          RUR:  14%  Risk Level: [x]Low []Moderate []High    Transition of care plan:  Awaiting medical clearance and DC order. PT/OT treating. ID following. Awaiting ostomy output. PICC placed today. Home with HH and IV abx - [facility] accepted pt for [facility] care. [facility] will come on day of discharge to provide teaching and to deliver IV abx. Outpatient follow-up. Pt's family to transport.

## 2023-12-29 VITALS
DIASTOLIC BLOOD PRESSURE: 62 MMHG | OXYGEN SATURATION: 96 % | SYSTOLIC BLOOD PRESSURE: 116 MMHG | HEART RATE: 102 BPM | RESPIRATION RATE: 16 BRPM | WEIGHT: 253 LBS | TEMPERATURE: 98.2 F | BODY MASS INDEX: 40.66 KG/M2 | HEIGHT: 66 IN

## 2023-12-29 PROCEDURE — 94761 N-INVAS EAR/PLS OXIMETRY MLT: CPT

## 2023-12-29 PROCEDURE — 6370000000 HC RX 637 (ALT 250 FOR IP): Performed by: SURGERY

## 2023-12-29 PROCEDURE — A4216 STERILE WATER/SALINE, 10 ML: HCPCS | Performed by: INTERNAL MEDICINE

## 2023-12-29 PROCEDURE — APPNB30 APP NON BILLABLE TIME 0-30 MINS: Performed by: NURSE PRACTITIONER

## 2023-12-29 PROCEDURE — 6370000000 HC RX 637 (ALT 250 FOR IP): Performed by: INTERNAL MEDICINE

## 2023-12-29 PROCEDURE — 6360000002 HC RX W HCPCS: Performed by: SURGERY

## 2023-12-29 PROCEDURE — 2580000003 HC RX 258: Performed by: NURSE PRACTITIONER

## 2023-12-29 PROCEDURE — 6370000000 HC RX 637 (ALT 250 FOR IP): Performed by: NURSE PRACTITIONER

## 2023-12-29 PROCEDURE — 6360000002 HC RX W HCPCS: Performed by: NURSE PRACTITIONER

## 2023-12-29 PROCEDURE — 6360000002 HC RX W HCPCS: Performed by: INTERNAL MEDICINE

## 2023-12-29 PROCEDURE — 2580000003 HC RX 258: Performed by: INTERNAL MEDICINE

## 2023-12-29 RX ORDER — OXYCODONE HYDROCHLORIDE 5 MG/1
5 TABLET ORAL
Qty: 12 TABLET | Refills: 0 | Status: SHIPPED
Start: 2023-12-29 | End: 2024-01-01

## 2023-12-29 RX ORDER — OXYCODONE HYDROCHLORIDE 5 MG/1
5 TABLET ORAL EVERY 6 HOURS PRN
Qty: 15 TABLET | Refills: 0 | Status: SHIPPED | OUTPATIENT
Start: 2023-12-29 | End: 2024-01-05

## 2023-12-29 RX ADMIN — PANTOPRAZOLE SODIUM 40 MG: 40 TABLET, DELAYED RELEASE ORAL at 05:20

## 2023-12-29 RX ADMIN — OXYCODONE 5 MG: 5 TABLET ORAL at 09:52

## 2023-12-29 RX ADMIN — PROCHLORPERAZINE EDISYLATE 10 MG: 5 INJECTION INTRAMUSCULAR; INTRAVENOUS at 12:51

## 2023-12-29 RX ADMIN — ERTAPENEM SODIUM 1000 MG: 1 INJECTION INTRAMUSCULAR; INTRAVENOUS at 15:44

## 2023-12-29 RX ADMIN — DAPTOMYCIN 650 MG: 500 INJECTION, POWDER, LYOPHILIZED, FOR SOLUTION INTRAVENOUS at 16:46

## 2023-12-29 RX ADMIN — OXYCODONE HYDROCHLORIDE 10 MG: 5 TABLET ORAL at 05:19

## 2023-12-29 RX ADMIN — OXYCODONE HYDROCHLORIDE 10 MG: 5 TABLET ORAL at 00:30

## 2023-12-29 RX ADMIN — ACETAMINOPHEN 650 MG: 325 TABLET ORAL at 12:52

## 2023-12-29 RX ADMIN — OXYCODONE HYDROCHLORIDE 10 MG: 5 TABLET ORAL at 15:48

## 2023-12-29 RX ADMIN — WATER 2000 MG: 1 INJECTION INTRAMUSCULAR; INTRAVENOUS; SUBCUTANEOUS at 12:52

## 2023-12-29 RX ADMIN — METRONIDAZOLE 500 MG: 250 TABLET ORAL at 05:19

## 2023-12-29 RX ADMIN — ENOXAPARIN SODIUM 30 MG: 100 INJECTION SUBCUTANEOUS at 09:52

## 2023-12-29 RX ADMIN — METRONIDAZOLE 500 MG: 250 TABLET ORAL at 12:53

## 2023-12-29 RX ADMIN — POTASSIUM CHLORIDE 40 MEQ: 750 TABLET, FILM COATED, EXTENDED RELEASE ORAL at 09:52

## 2023-12-29 RX ADMIN — DOCUSATE SODIUM 100 MG: 100 CAPSULE, LIQUID FILLED ORAL at 09:55

## 2023-12-29 ASSESSMENT — PAIN DESCRIPTION - ORIENTATION
ORIENTATION: MID
ORIENTATION: ANTERIOR;MID
ORIENTATION: ANTERIOR;MID
ORIENTATION: MID

## 2023-12-29 ASSESSMENT — PAIN DESCRIPTION - DESCRIPTORS
DESCRIPTORS: ACHING
DESCRIPTORS: ACHING
DESCRIPTORS: ACHING;DULL
DESCRIPTORS: STABBING

## 2023-12-29 ASSESSMENT — PAIN DESCRIPTION - LOCATION
LOCATION: ABDOMEN
LOCATION: ABDOMEN
LOCATION: ABDOMEN;INCISION
LOCATION: ABDOMEN;INCISION

## 2023-12-29 ASSESSMENT — PAIN SCALES - GENERAL
PAINLEVEL_OUTOF10: 5
PAINLEVEL_OUTOF10: 7
PAINLEVEL_OUTOF10: 5
PAINLEVEL_OUTOF10: 7

## 2023-12-29 NOTE — CARE COORDINATION
12/29/2023  11:37 AM  Case management note    Patient is discharging today. Sent updated clinical notes to [facility] and [facility] . Teaching will be done this afternoon.   [facility]

## 2023-12-29 NOTE — WOUND CARE
Ostomy visit: Post op day #11 EXPLORATORY LAPAROTOMY, PARTIAL COLECTOMY, OMENECTOMY, ABDOMINAL WASHOUT, CREATION OF OSTOMY. In with patient and her  Jimmy Mcdonald to have him change appliance with standby assist.  Patient with good output from stoma. Brown, milkshake consistency. Assessment:  LUQ colostomy - moist pink stoma, separation superiorly ~ 1 to 3 o'clock with pink/yellow subcutaneous tissue noted, no surrounding redness, appliance has been wearing well. Had two staples in upper left quadrant and after discussion with Dr. Francina Riedel, these were removed. Teaching:  Jimmy Mcdonald measured, cut out, used cohesive ring placing to back of appliance, prepped skin with skin prep and placed appliance. He then closed the pouch. Discussed how often to change, about every three to four days. Empty as needed during day. Supplies ample for discharge. Reviewed supply list for ordering and companies to call.  to hopefully assist with this and continue to offer support during ostomy change.   Paul Frey RN, [employer]

## 2023-12-29 NOTE — PLAN OF CARE
Problem: Discharge Planning  Goal: Discharge to home or other facility with appropriate resources  Outcome: Progressing     Problem: Pain  Goal: Verbalizes/displays adequate comfort level or baseline comfort level  12/29/2023 1121 by Christell Schwab, LPN  Outcome: Progressing  12/28/2023 2132 by Flores Hale RN  Outcome: Progressing     Problem: Safety - Adult  Goal: Free from fall injury  12/29/2023 1121 by Christell Schwab, LPN  Outcome: Progressing  12/28/2023 2132 by Flores Hale RN  Outcome: Progressing     Problem: ABCDS Injury Assessment  Goal: Absence of physical injury  12/29/2023 1121 by Christell Schwab, LPN  Outcome: Progressing  12/28/2023 2132 by Flores Hale RN  Outcome: Progressing     Problem: Skin/Tissue Integrity  Goal: Absence of new skin breakdown  Description: 1. Monitor for areas of redness and/or skin breakdown  2. Assess vascular access sites hourly  3. Every 4-6 hours minimum:  Change oxygen saturation probe site  4. Every 4-6 hours:  If on nasal continuous positive airway pressure, respiratory therapy assess nares and determine need for appliance change or resting period.   12/29/2023 1121 by Christell Schwab, LPN  Outcome: Progressing  12/28/2023 2132 by Flores Hale RN  Outcome: Progressing     Problem: Nutrition Deficit:  Goal: Optimize nutritional status  Outcome: Progressing

## 2023-12-29 NOTE — DISCHARGE SUMMARY
Discharge Summary    Patient: Pily Howell               Sex: female          DOA: [unfilled]       YOB: 1970      Age:  53 y.o.        LOS:  LOS: 13 days                Admit Date: 12/16/2023    Discharge Date: 12/29/2023    Admission Diagnoses: Postoperative intra-abdominal abscess [T81.43XA]  Abscess [L02.91]  Bandemia [D72.825]  Paralytic ileus of small intestine (HCC) [K56.0]    Discharge Diagnoses:      Discharge Condition: Good    Hospital Course: Pt was admitted through the ER with an anastomotic leak.  She was taken to the OR and found to have complete necrosis of the anastomosis.  She had a Leiva's procedure.  She made good progress post operatively but had slow return of bowel function.  On discharge she is ambulating independently tolerating a diet, pain is controlled and ostomy functioning.    PE:  Gen: NAD  Resp: aerating well, no distress  Abd: soft NTND, midline incision healing, ostomy pink and productive    Consults: None    Significant Diagnostic Studies: radiology: CT scan: abdomen    Discharge Medications:     Current Discharge Medication List        CONTINUE these medications which have CHANGED    Details   oxyCODONE (ROXICODONE) 5 MG immediate release tablet Take 1 tablet by mouth every 4-6 hours as needed for Pain for up to 3 days. Max Daily Amount: 30 mg  Qty: 12 tablet, Refills: 0    Comments: Reduce doses taken as pain becomes manageable  Patient has this medication at home already  Associated Diagnoses: Acute postoperative pain           CONTINUE these medications which have NOT CHANGED    Details   ondansetron (ZOFRAN-ODT) 4 MG disintegrating tablet Take 1 tablet by mouth every 8 hours as needed for Nausea or Vomiting  Qty: 10 tablet, Refills: 0      acetaminophen (TYLENOL) 500 MG tablet Take by mouth as needed      ammonium lactate (LAC-HYDRIN) 12 % lotion Apply topically as needed      Cetirizine HCl (ZYRTEC ALLERGY) 10 MG CAPS Take by mouth      gabapentin  (NEURONTIN) 100 MG capsule 1 po tid prn  Indications: neuropathic pain      ibuprofen (ADVIL;MOTRIN) 200 MG tablet Take by mouth as needed      Naproxen Sodium 220 MG CAPS Take by mouth as needed           STOP taking these medications       metroNIDAZOLE (FLAGYL) 500 MG tablet Comments:   Reason for Stopping:         levoFLOXacin (LEVAQUIN) 750 MG tablet Comments:   Reason for Stopping:               Activity: activity as tolerated    Diet: regular diet    Wound Care: ok to shower, keep midline wound covered, ostomy care    Follow-up: 2 weeks

## 2023-12-29 NOTE — CARE COORDINATION
12/29/2023  11:04 AM  Case management note    Discussed in IDR rounds. Patient will be discharging today. Will send updated clinicals to [address] [facility],[address] and [address] [place] [address].  Will continue to follow.   [facility]

## 2023-12-29 NOTE — PLAN OF CARE
Problem: Pain  Goal: Verbalizes/displays adequate comfort level or baseline comfort level  12/28/2023 2132 by Viola Figueroa RN  Outcome: Progressing     Problem: Safety - Adult  Goal: Free from fall injury  12/28/2023 2132 by Viola Figueroa RN  Outcome: Progressing     Problem: ABCDS Injury Assessment  Goal: Absence of physical injury  12/28/2023 2132 by Viola Figueroa RN  Outcome: Progressing     Problem: Skin/Tissue Integrity  Goal: Absence of new skin breakdown  Description: 1. Monitor for areas of redness and/or skin breakdown  2. Assess vascular access sites hourly  3. Every 4-6 hours minimum:  Change oxygen saturation probe site  4. Every 4-6 hours:  If on nasal continuous positive airway pressure, respiratory therapy assess nares and determine need for appliance change or resting period.   12/28/2023 2132 by Viola Figueroa RN  Outcome: Progressing

## 2024-01-08 NOTE — PROGRESS NOTES
Physician Progress Note      PATIENT:               RONALD PICKARD  [facility] #:                  [id number]  :                       1970  ADMIT DATE:       2023 2:48 PM  DISCH DATE:        2023 5:00 PM  RESPONDING  PROVIDER #:        Francheska Buchanna MD          QUERY TEXT:    Pt admitted with anastomotic leak with fecal peritonitis and noted to have    surgical pathology findings of \"fallopian tube, left,   salpingo-oophorectomy.\"  To accurately reflect the procedure performed, please   addend the OR note to include if you performed any of the following:    The medical record reflects the following:  Risk Factors: anastomotic leak with peritonitis  Clinical Indicators: pt admitted with anastomotic leak with fecal peritonitis  - to OR 23 for exploratory laparotomy with creation of end ostomy and   partial omentectomy  - Surgical Path Report Findings: Fallopian tube, left, salpingo-oophorectomy:   Portion of benign fallopian tube with acute and chronic inflammation and   reactive changes. No ovarian tissue identified.  Treatment: exploratory laparotomy, surgical pathology    Thank you,    Tamara West RN  CDI  Options provided:  -- Left salpingo-oophorectomy  -- Left salpingectomy  -- Left partial salpingectomy  -- Other - I will add my own diagnosis  -- Disagree - Not applicable / Not valid  -- Disagree - Clinically unable to determine / Unknown  -- Refer to Clinical Documentation Reviewer    PROVIDER RESPONSE TEXT:    Addendum to 23 procedure note: Left partial salpingectomy was performed   during procedure on 23.    Query created by: Tamara West on 1/3/2024 10:18 AM      QUERY TEXT:    Pt admitted with anastomotic leak with fecal peritonitis and noted to have   23 OR procedure for exploratory laparotomy with creation of end ostomy.   To accurately reflect the procedure performed, please addend the OR note to   include further specificity of end ostomy site if

## 2024-01-09 ENCOUNTER — HOSPITAL ENCOUNTER (OUTPATIENT)
Facility: HOSPITAL | Age: 54
Discharge: HOME | End: 2024-01-09
Payer: COMMERCIAL

## 2024-01-09 PROCEDURE — 99213 OFFICE O/P EST LOW 20 MIN: CPT

## 2024-01-09 NOTE — WOUND CARE
Ostomy Care Plan  Risk for impaired skin integrity  Goal: Patient will exhibit intact peristomal skin at each visit.  Interventions: Assess patients peristomal skin at each visit.                            Instruct patient in peristomal skin care procedures at each visit.    Outcomes: Patient will demonstrate proper sizing and application of wafer.                       Patient will demonstrate intact peristomal skin without irritation.                       Patient will verbalize two strategies to prevent skin irritation.    Risk for disturbed body image  Goal: Patient will verbalize concerns in appearance, relationships and lifestyle changes.  Interventions: Assess patients coping mechanisms and body image issues at each visit.  Outcomes: Patient will verbalize/demonstrate comfort with body image as evidenced by willingness to manage ostomy care and acceptance of body image change.     Knowledge Deficit r/t Ostomy Care  Goal: Patient will verbalize/demonstrate independent ostomy care by end of teaching sessions.  Interventions: Assess patients understanding/ability to manage ostomy care including daily maintenance of pouch and intermittent changing of appliance.  Outcomes: Patient will demonstrate ability to remove, clean and reapply ostomy pouch.  Patient will verbalize understanding of ostomy pouching complications (i.e. failure to maintain a seal for at least 3 to 4 days, leaking of pouch, skin irritation of peristomal skin).     Goal 1 completed during todays visit.  Goal 2 begun today will complete at next visit in 2 weeks.

## 2024-01-09 NOTE — WOUND CARE
[facility]  [address] [place], [place] [id number] [phone] Phone  [phone]  Patient /Ostomy Assessment- Document in Flowsheet I&O   Points   Review of chart []   0   Assess Complete Ostomy tab in Navigator for assessment of; stoma status, peristomal skin, presence of hernia/stool consistency/diet/related medications   Simple adjustments to pouch size/pouch system, new stoma pattern, accessory addition/deletion.   []   1   Assess Complete Ostomy tab in Navigator for assessment of; stoma status, peristomal skin, presence of hernia/stool consistency/diet/related medications   Moderate adjustments to pouch size/pouch system, new stoma pattern, accessory addition/deletion.  Observe patient/caregiver with hands-on care.   1-2 adjustments to pouch size/system/skin care/accessory addition or deletion.    []   2   Assess Complete Ostomy tab in Navigator for assessment of; stoma status, peristomal skin, presence of hernia/stool consistency/diet/related medications   Complex adjustments to pouch size/pouch system, new stoma pattern, accessory addition/deletion.  3 or more complex adjustments to pouch size/system/skin care/accessory addition or deletion.  Observe patient/caregiver with hands-on care.   Assess patient/patient abdomen for optimal pre-marked stoma site.  Assess patient abdomen for type of hernia belt/accessory needed. [x]   3         Ambulation Status Documented in WOCN Clinical Note  Status Definition Points   Independent Independently able to ambulate.  Fully able (without any assistance) to get on/off exam table/chair.    [x]   0   Minimal Physical Assistance Requires physical assistance of one person to ambulate and/or position patient to be examined. Includes necessary physical assistance to position lower extremities on/off stool.   []   1   Moderate Physical Assistance Requires at least one staff member to physically assist patient in ambulating into

## 2024-01-09 NOTE — WOUND CARE
Discharge Instructions/Wound Orders  [facility]  [address], [address]  [place] [address]  Telephone: [phone]     FAX [phone]    NAME:  Pily Howell  YOB: 1970  MEDICAL RECORD NUMBER:  [id number]  DATE:  1/9/2024  Ostomy Care Orders:  1) Remove old bag and clean stoma and peristomal skin with tap water and paper towels, dry thoroughly.  2) If skin irritated treat crusting (stomahesive powder and No sting skin prep) x 1 layer. If no irritation skip this step.  3) Cut [place] [address] to size using template, remove plastic backing and apply thin bead of paste, then place bag over stoma site.  4) Attach ostomy belt and change 2 x week and as needed for leaking.     Supply List   Jasiel [id number]  [place] ostomy belt #7299  Stomahesive powder  No Sting skin prep    Dietary:  [x] Diet as tolerated: [] Calorie Diabetic Diet:Low carb and no Sugar [] No Added Salt:[] Increase Protein: [] Other:Limit the amount of liquid you are drinking and avoid drinking in between meals   Activity:  [x] Activity as tolerated:  [] Patient has no activity restrictions     [] Strict Bedrest: [] Remain off Work:     [] May return to full duty work:                                   [] Return to work with restrictions:             Return Appointment:  [x] Return Appointment: With ANNE MARIE Huizar  in  2 Week(s)  [] Ordered tests:    Electronically signed Tracy Hahn RN on 1/9/2024 at 4:02 PM     Wound Care Center Information: Should you experience any significant changes in your wound(s) or have questions about your wound care, please contact the [facility] at MONDAY - FRIDAY 8:00 am - 4:30.  If you need help with your wound outside these hours and cannot wait until we are again available, contact your PCP or go to the hospital emergency room.     PLEASE NOTE: IF YOU ARE UNABLE TO OBTAIN WOUND SUPPLIES, CONTINUE TO USE THE SUPPLIES YOU HAVE

## 2024-01-09 NOTE — WOUND CARE
OSTOMY Assessment    Stoma Tissue Assessment: _x__Post-op ___Follow-up       Type of Diversion: _x__New___ Established ___Revision___Permanent____Temporary    _____  Ileostomy   __x___  Colostomy: ____ Ascending, ____ Transverse, _____. Sigmoid   _____  Urostomy   _____  Ileal Conduit   _____  Mucous Fistula     Appearance of Ostomy:   _x__ Bright Red /Moist/Viable ___ Dark Red ___ Pink ___ Sloughing ___Necrotic____Pallor ____Red  ___Dry ____Moist    Stoma Size: ____32_____ (mm) ___Round _x__ Oval ___Irregular     Stoma Height:   ____Flush __x__Retracted ____Budded ____Edematous ____Prolapse     Stoma Location  __x__ Left Side          ____Right Side     _____Umbilicus  _____Incision Line  __x__ Above Belt       ____ Below Belt    Abdominal Contours  ____ Firm ____ Flat _x___Flabby _x__Soft _x__Round ___ Hard ___ Other    GI Stoma Function: _x__ Yes ___No   Output:   ____Sero-Sanguenous ____Sanguenous____ Bilious ____ Liquid ____Semi-liquid ____ Pasty ____ Formed _x__Soft ___Firm ____ Black____Brown ____Foul Odor    Peristomal skin:   ___ Intact __x_ Irritant Dermatitis ___ Allergic Contact Dermatitis ___Candidiasis ___Caput Medusae ___Folliculitis ___Mechanical Trauma ___Mucosal Transplantation    ___Pyoderma Gangrenosum ___Hyperplasia ___Radiation Trauma ___Allergies mpling  ___ Dimpling ____Blistered ____Fragile ____Macerated ___Peeling  ___Ulceration  ___ Fungal ___Peristomal Hernia ___Non-blanchable ___ Hypergranulation      Stoma Complications:   __Excessive Bleeding __Ischemia __ Abscess __Necrosis __Prolapse   __Hernia __ Retraction __Stenosis __Mucosal Separation __Melanosis Coli   __Laceration __Other     Application for Patient    Wafer and pouch used during assessment and education ___[place] [id number]_________    Pouch System Recommended:   _x_One-Piece __Two-Piece ___Custom     Flat:   ___Pre-cut   ___Cut-to fit     Convexity: ___Shallow ___Deep_x__ Flexible ___Creative Convexity   ___Pre-cut __x_Cut to

## 2024-01-23 ENCOUNTER — HOSPITAL ENCOUNTER (OUTPATIENT)
Facility: HOSPITAL | Age: 54
Discharge: HOME | End: 2024-01-23
Attending: SURGERY
Payer: COMMERCIAL

## 2024-01-23 VITALS
RESPIRATION RATE: 16 BRPM | DIASTOLIC BLOOD PRESSURE: 72 MMHG | HEART RATE: 95 BPM | TEMPERATURE: 98.1 F | SYSTOLIC BLOOD PRESSURE: 129 MMHG

## 2024-01-23 PROCEDURE — 99212 OFFICE O/P EST SF 10 MIN: CPT

## 2024-01-23 NOTE — WOUND CARE
Clinical Level of Care Assessment    Outpatient Ostomy Care      NAME:  Pily Howell  YOB: 1970  MEDICAL RECORD NUMBER:  [id number]   DATE:  1/23/2024      Patient /Ostomy Assessment- Document in Flowsheet I&O   Points   Review of chart []   0   Assess the Complete Ostomy tab in Navigator for assessment of; stoma status, peristomal skin, presence of hernia/stool consistency/diet/related medications.   Simple adjustments to pouch size/pouch system, new stoma pattern, accessory addition/deletion.   []   1   Assess the Complete Ostomy tab in Navigator for assessment of; stoma status, peristomal skin, presence of hernia/stool consistency/diet/related medications.   Moderate adjustments to pouch size/pouch system, new stoma pattern, and accessory addition/deletion.  Observe patient/caregiver with hands-on care.   1-2 adjustments to pouch size/system/skincare/accessory addition or deletion.    [x]   2   Assess the Complete Ostomy tab in Navigator for assessment of; stoma status, peristomal skin, presence of hernia/stool consistency/diet/related medications.   Complex adjustments to pouch size/pouch system, new stoma pattern, accessory addition/deletion.  3 or more complex adjustments to pouch size/system/skincare/accessory addition or deletion.  Observe patient/caregiver with hands-on care.   Assess patient/patient's abdomen for optimal pre-marked stoma site.  Assess the patient's abdomen for a type of hernia belt/accessory needed. []   3         Ambulation Status Documented in CN Clinical Note  Status Definition Points   Independent Independently able to ambulate.  Fully able (without any assistance) to get on/off the exam table/chair.    [x]   0   Minimal Physical Assistance Requires physical assistance of one person to ambulate and position the patient to be examined. Includes necessary physical assistance to position lower extremities on/off the stool.   []   1   Moderate Physical Assistance 
OSTOMY Assessment    Stoma Tissue Assessment: Post-op ___Follow-up       Type of Diversion: _x__New___ Established ___Revision___Permanent____Temporary    _____  Ileostomy   __x___  Colostomy: ____ Ascending, ____ Transverse, _____. Sigmoid   _____  Urostomy   _____  Ileal Conduit   _____  Mucous Fistula     Appearance of Ostomy:   _x__ Bright Red /Moist/Viable ___ Dark Red ___ Pink ___ Sloughing ___Necrotic____Pallor ____Red  ___Dry ____Moist    Stoma Size: ___30______ (mm) ___Round _x__ Oval ___Irregular     Stoma Height:   _x___Flush ____Retracted ____Budded ____Edematous ____Prolapse     Stoma Location  _x___ Left Side          ____Right Side     _____Umbilicus  _____Incision Line  ____ Above Belt       ____ Below Belt    Abdominal Contours  ____ Firm ____ Flat ____Flabby __x_Soft _x__Round ___ Hard ___ Other    GI Stoma Function: _x__ Yes ___No   Output:   ____Sero-Sanguenous ____Sanguenous____ Bilious ____ Liquid ____Semi-liquid ____ Pasty ____ Formed __x_Soft ___Firm ____ Black____Brown ____Foul Odor    Peristomal skin: Small area 0.5 x 0.2 at distal border  ___ Intact __x_ Irritant Dermatitis ___ Allergic Contact Dermatitis ___Candidiasis ___Caput Medusae ___Folliculitis ___Mechanical Trauma ___Mucosal Transplantation    ___Pyoderma Gangrenosum ___Hyperplasia ___Radiation Trauma ___Allergies mpling  ___ Dimpling ____Blistered ____Fragile ____Macerated ___Peeling  ___Ulceration  ___ Fungal ___Peristomal Hernia ___Non-blanchable ___ Hypergranulation      Stoma Complications:   __Excessive Bleeding __Ischemia __ Abscess __Necrosis __Prolapse   __Hernia __ Retraction __Stenosis __Mucosal Separation __Melanosis Coli   __Laceration __Other     Application for Patient    Wafer and pouch used during assessment and education __[employer]oplast [address] [address] flat__________    Pouch System Recommended:   _x_One-Piece __Two-Piece ___Custom     Flat:   ___Pre-cut   _x__Cut-to fit     Convexity: ___Shallow ___Deep___ Flexible 
TO USE THE SUPPLIES YOU HAVE AVAILABLE UNTIL YOU ARE ABLE TO REACH US. IT IS MOST IMPORTANT TO KEEP THE WOUND COVERED AT ALL TIMES.     CWON Signature:_______________________    Date: ___________ Time:  ____________

## 2024-02-15 ENCOUNTER — HOSPITAL ENCOUNTER (OUTPATIENT)
Facility: HOSPITAL | Age: 54
End: 2024-02-15
Payer: COMMERCIAL

## 2024-02-15 ENCOUNTER — HOSPITAL ENCOUNTER (OUTPATIENT)
Facility: HOSPITAL | Age: 54
Discharge: HOME | End: 2024-02-15
Payer: COMMERCIAL

## 2024-02-15 VITALS
RESPIRATION RATE: 12 BRPM | OXYGEN SATURATION: 98 % | DIASTOLIC BLOOD PRESSURE: 88 MMHG | SYSTOLIC BLOOD PRESSURE: 120 MMHG | WEIGHT: 224.8 LBS | TEMPERATURE: 97.8 F | HEIGHT: 66 IN | HEART RATE: 83 BPM | BODY MASS INDEX: 36.13 KG/M2

## 2024-02-15 LAB
ABO + RH BLD: NORMAL
ALBUMIN SERPL-MCNC: 3.7 G/DL (ref 3.5–5)
ALBUMIN/GLOB SERPL: 0.9 (ref 1.1–2.2)
ALP SERPL-CCNC: 92 U/L (ref 45–117)
ALT SERPL-CCNC: 23 U/L (ref 12–78)
ANION GAP SERPL CALC-SCNC: 3 MMOL/L (ref 5–15)
AST SERPL-CCNC: 13 U/L (ref 15–37)
BILIRUB SERPL-MCNC: 0.6 MG/DL (ref 0.2–1)
BLOOD GROUP ANTIBODIES SERPL: NORMAL
BUN SERPL-MCNC: 11 MG/DL (ref 6–20)
BUN/CREAT SERPL: 19 (ref 12–20)
CALCIUM SERPL-MCNC: 9.9 MG/DL (ref 8.5–10.1)
CHLORIDE SERPL-SCNC: 108 MMOL/L (ref 97–108)
CO2 SERPL-SCNC: 29 MMOL/L (ref 21–32)
CREAT SERPL-MCNC: 0.58 MG/DL (ref 0.55–1.02)
EKG ATRIAL RATE: 70 BPM
EKG DIAGNOSIS: NORMAL
EKG P AXIS: 27 DEGREES
EKG P-R INTERVAL: 160 MS
EKG Q-T INTERVAL: 408 MS
EKG QRS DURATION: 88 MS
EKG QTC CALCULATION (BAZETT): 440 MS
EKG R AXIS: 25 DEGREES
EKG T AXIS: 37 DEGREES
EKG VENTRICULAR RATE: 70 BPM
ERYTHROCYTE [DISTWIDTH] IN BLOOD BY AUTOMATED COUNT: 13.7 % (ref 11.5–14.5)
EST. AVERAGE GLUCOSE BLD GHB EST-MCNC: 85 MG/DL
GLOBULIN SER CALC-MCNC: 3.9 G/DL (ref 2–4)
GLUCOSE SERPL-MCNC: 94 MG/DL (ref 65–100)
HBA1C MFR BLD: 4.6 % (ref 4–5.6)
HCT VFR BLD AUTO: 39.6 % (ref 35–47)
HGB BLD-MCNC: 12.4 G/DL (ref 11.5–16)
MCH RBC QN AUTO: 26.6 PG (ref 26–34)
MCHC RBC AUTO-ENTMCNC: 31.3 G/DL (ref 30–36.5)
MCV RBC AUTO: 84.8 FL (ref 80–99)
NRBC # BLD: 0 K/UL (ref 0–0.01)
NRBC BLD-RTO: 0 PER 100 WBC
PLATELET # BLD AUTO: 317 K/UL (ref 150–400)
PMV BLD AUTO: 11.3 FL (ref 8.9–12.9)
POTASSIUM SERPL-SCNC: 4 MMOL/L (ref 3.5–5.1)
PROT SERPL-MCNC: 7.6 G/DL (ref 6.4–8.2)
RBC # BLD AUTO: 4.67 M/UL (ref 3.8–5.2)
SODIUM SERPL-SCNC: 140 MMOL/L (ref 136–145)
SPECIMEN EXP DATE BLD: NORMAL
WBC # BLD AUTO: 6.7 K/UL (ref 3.6–11)

## 2024-02-15 PROCEDURE — 86850 RBC ANTIBODY SCREEN: CPT

## 2024-02-15 PROCEDURE — 86901 BLOOD TYPING SEROLOGIC RH(D): CPT

## 2024-02-15 PROCEDURE — 93010 ELECTROCARDIOGRAM REPORT: CPT | Performed by: INTERNAL MEDICINE

## 2024-02-15 PROCEDURE — 86900 BLOOD TYPING SEROLOGIC ABO: CPT

## 2024-02-15 PROCEDURE — 71046 X-RAY EXAM CHEST 2 VIEWS: CPT

## 2024-02-15 PROCEDURE — 36415 COLL VENOUS BLD VENIPUNCTURE: CPT

## 2024-02-15 PROCEDURE — 85027 COMPLETE CBC AUTOMATED: CPT

## 2024-02-15 PROCEDURE — 93005 ELECTROCARDIOGRAM TRACING: CPT | Performed by: SURGERY

## 2024-02-15 PROCEDURE — 80053 COMPREHEN METABOLIC PANEL: CPT

## 2024-02-15 PROCEDURE — 83036 HEMOGLOBIN GLYCOSYLATED A1C: CPT

## 2024-02-15 NOTE — PERIOP NOTE
[facility]                   [address] [place] [address]  PRE-ADMISSION TESTING    [phone]     Surgery Date:   Wednesday 2/21/2024   [place] staff will call you between 3 and 7pm the day before your surgery with your arrival time. Call [phone] after 7pm Tuesday 2/20/2024 if you did not receive this call.  INSTRUCTIONS BEFORE YOUR SURGERY   When You  Arrive Please utilize our free  parking upon arrival, they open at 7a.m.    Proceed to the 2nd Floor Admitting Desk. Have your insurance card, photo ID, and any copayment (if needed).   Food   and   Drink NO food or drink after midnight the night before surgery. This means NO water, gum, mints, coffee, juice, etc. No alcohol (beer, wine, liquor) 24 hours before or after surgery.    Exception:  your Ensure Pre-Surgery Nutrition Plan.   Medications  You will be given tylenol, celebrex, movantik, & stool softener in Pre-Op. Do NOT take tylenol day of surgery.              Regular medications to take day of surgery: zyrtec if needed     Medications  To  STOP  7 days before surgery Non-Steroidal Anti-Inflammatory Drugs (NSAIDs):  Ibuprofen (Advil, Motrin), Naproxen (Aleve)  Aspirin containing products for pain   Herbal supplements, vitamins, and fish oil      Ensure for ERAS Please use 'Your Pre-Surgery Nutrition Plan' to keep track of your progress.     You will consume 2 Ensure Surgery Shakes (vanilla) daily for 6 days prior to your surgery.     You will consume 2 Ensure Pre-Surgery Shakes (strawberry) the night before your surgery & 1 Ensure Pre-Surgery Shake on the way to the hospital the day of your surgery.    Bathing Clothing  Jewelry  Valuables     If you shower the morning of surgery, please do not apply anything to your skin (lotions, powders, deodorant, or makeup, especially mascara).  Do not shave or trim any body part 24 hours before surgery.  Leave money, valuables, and jewelry, including body piercings,  English

## 2024-02-15 NOTE — PROGRESS NOTES
ERAS protocol and handbooks reviewed with patient. Handbooks given. Bowel prep reviewed. (Per pt, Dr Buchanan instructed her to have clear liquids one day prior to surgery). Supplements reviewed.  All questions answered.

## 2024-02-15 NOTE — PERIOP NOTE
Pre-Operative Nutrition Score        Has the patient had an unplanned weight loss of 10%  of body weight or more in the last 6 months? Yes = 1    Has the patient angelita eating less than 50% of their normal diet in the preceding week? No = 0    Patient instructed on Non-Diabetic pre-operative nutrition plan to start 6 days prior to surgery.    Patient given 10 shakes and 3 pre-surgery drinks.    Opportunity given for questions and all questions answered.

## 2024-02-16 RX ORDER — POLYETHYLENE GLYCOL 3350 17 G/17G
17 POWDER, FOR SOLUTION ORAL DAILY
Status: ON HOLD | COMMUNITY

## 2024-02-21 ENCOUNTER — HOSPITAL ENCOUNTER (INPATIENT)
Facility: HOSPITAL | Age: 54
LOS: 16 days | Discharge: HOME HEALTH | DRG: 330 | End: 2024-03-08
Attending: SURGERY | Admitting: SURGERY
Payer: COMMERCIAL

## 2024-02-21 ENCOUNTER — ANESTHESIA (OUTPATIENT)
Facility: HOSPITAL | Age: 54
End: 2024-02-21
Payer: COMMERCIAL

## 2024-02-21 ENCOUNTER — ANESTHESIA EVENT (OUTPATIENT)
Facility: HOSPITAL | Age: 54
End: 2024-02-21
Payer: COMMERCIAL

## 2024-02-21 DIAGNOSIS — K57.92 DIVERTICULITIS: Primary | ICD-10-CM

## 2024-02-21 LAB
ABO + RH BLD: NORMAL
BLOOD GROUP ANTIBODIES SERPL: NORMAL
GLUCOSE BLD STRIP.AUTO-MCNC: 154 MG/DL (ref 65–117)
SERVICE CMNT-IMP: ABNORMAL
SPECIMEN EXP DATE BLD: NORMAL

## 2024-02-21 PROCEDURE — 1100000000 HC RM PRIVATE

## 2024-02-21 PROCEDURE — 2580000003 HC RX 258: Performed by: SURGERY

## 2024-02-21 PROCEDURE — 2720000010 HC SURG SUPPLY STERILE: Performed by: SURGERY

## 2024-02-21 PROCEDURE — 86901 BLOOD TYPING SEROLOGIC RH(D): CPT

## 2024-02-21 PROCEDURE — 7100000001 HC PACU RECOVERY - ADDTL 15 MIN: Performed by: SURGERY

## 2024-02-21 PROCEDURE — 6360000002 HC RX W HCPCS: Performed by: ANESTHESIOLOGY

## 2024-02-21 PROCEDURE — 6360000002 HC RX W HCPCS: Performed by: SURGERY

## 2024-02-21 PROCEDURE — 2500000003 HC RX 250 WO HCPCS: Performed by: NURSE ANESTHETIST, CERTIFIED REGISTERED

## 2024-02-21 PROCEDURE — 3700000001 HC ADD 15 MINUTES (ANESTHESIA): Performed by: SURGERY

## 2024-02-21 PROCEDURE — 3600000009 HC SURGERY ROBOT BASE: Performed by: SURGERY

## 2024-02-21 PROCEDURE — 2709999900 HC NON-CHARGEABLE SUPPLY: Performed by: SURGERY

## 2024-02-21 PROCEDURE — C9290 INJ, BUPIVACAINE LIPOSOME: HCPCS | Performed by: SURGERY

## 2024-02-21 PROCEDURE — 86850 RBC ANTIBODY SCREEN: CPT

## 2024-02-21 PROCEDURE — 0DJW4ZZ INSPECTION OF PERITONEUM, PERCUTANEOUS ENDOSCOPIC APPROACH: ICD-10-PCS | Performed by: SURGERY

## 2024-02-21 PROCEDURE — 6370000000 HC RX 637 (ALT 250 FOR IP): Performed by: SURGERY

## 2024-02-21 PROCEDURE — 3700000000 HC ANESTHESIA ATTENDED CARE: Performed by: SURGERY

## 2024-02-21 PROCEDURE — 36415 COLL VENOUS BLD VENIPUNCTURE: CPT

## 2024-02-21 PROCEDURE — 82962 GLUCOSE BLOOD TEST: CPT

## 2024-02-21 PROCEDURE — 2500000003 HC RX 250 WO HCPCS

## 2024-02-21 PROCEDURE — 86900 BLOOD TYPING SEROLOGIC ABO: CPT

## 2024-02-21 PROCEDURE — 0DNW0ZZ RELEASE PERITONEUM, OPEN APPROACH: ICD-10-PCS | Performed by: SURGERY

## 2024-02-21 PROCEDURE — 2580000003 HC RX 258

## 2024-02-21 PROCEDURE — 88304 TISSUE EXAM BY PATHOLOGIST: CPT

## 2024-02-21 PROCEDURE — 0DSM0ZZ REPOSITION DESCENDING COLON, OPEN APPROACH: ICD-10-PCS | Performed by: SURGERY

## 2024-02-21 PROCEDURE — 3600000019 HC SURGERY ROBOT ADDTL 15MIN: Performed by: SURGERY

## 2024-02-21 PROCEDURE — P9045 ALBUMIN (HUMAN), 5%, 250 ML: HCPCS

## 2024-02-21 PROCEDURE — S2900 ROBOTIC SURGICAL SYSTEM: HCPCS | Performed by: SURGERY

## 2024-02-21 PROCEDURE — 6360000002 HC RX W HCPCS

## 2024-02-21 PROCEDURE — 0DBP0ZZ EXCISION OF RECTUM, OPEN APPROACH: ICD-10-PCS | Performed by: SURGERY

## 2024-02-21 PROCEDURE — 7100000000 HC PACU RECOVERY - FIRST 15 MIN: Performed by: SURGERY

## 2024-02-21 RX ORDER — KETOROLAC TROMETHAMINE 30 MG/ML
30 INJECTION, SOLUTION INTRAMUSCULAR; INTRAVENOUS EVERY 6 HOURS
Status: DISCONTINUED | OUTPATIENT
Start: 2024-02-21 | End: 2024-02-23

## 2024-02-21 RX ORDER — DEXMEDETOMIDINE HYDROCHLORIDE 100 UG/ML
INJECTION, SOLUTION INTRAVENOUS PRN
Status: DISCONTINUED | OUTPATIENT
Start: 2024-02-21 | End: 2024-02-21 | Stop reason: SDUPTHER

## 2024-02-21 RX ORDER — LIDOCAINE HYDROCHLORIDE 20 MG/ML
INJECTION, SOLUTION EPIDURAL; INFILTRATION; INTRACAUDAL; PERINEURAL PRN
Status: DISCONTINUED | OUTPATIENT
Start: 2024-02-21 | End: 2024-02-21 | Stop reason: SDUPTHER

## 2024-02-21 RX ORDER — LIDOCAINE HYDROCHLORIDE ANHYDROUS AND DEXTROSE MONOHYDRATE 5; 400 G/100ML; MG/100ML
INJECTION, SOLUTION INTRAVENOUS CONTINUOUS PRN
Status: DISCONTINUED | OUTPATIENT
Start: 2024-02-21 | End: 2024-02-21 | Stop reason: SDUPTHER

## 2024-02-21 RX ORDER — SODIUM CHLORIDE, SODIUM LACTATE, POTASSIUM CHLORIDE, CALCIUM CHLORIDE 600; 310; 30; 20 MG/100ML; MG/100ML; MG/100ML; MG/100ML
INJECTION, SOLUTION INTRAVENOUS CONTINUOUS PRN
Status: DISCONTINUED | OUTPATIENT
Start: 2024-02-21 | End: 2024-02-21 | Stop reason: SDUPTHER

## 2024-02-21 RX ORDER — SODIUM CHLORIDE 0.9 % (FLUSH) 0.9 %
5-40 SYRINGE (ML) INJECTION PRN
Status: DISCONTINUED | OUTPATIENT
Start: 2024-02-21 | End: 2024-03-08 | Stop reason: HOSPADM

## 2024-02-21 RX ORDER — OXYCODONE HYDROCHLORIDE 5 MG/1
5 TABLET ORAL EVERY 4 HOURS PRN
Status: DISCONTINUED | OUTPATIENT
Start: 2024-02-21 | End: 2024-03-08 | Stop reason: HOSPADM

## 2024-02-21 RX ORDER — MIDAZOLAM HYDROCHLORIDE 1 MG/ML
INJECTION INTRAMUSCULAR; INTRAVENOUS PRN
Status: DISCONTINUED | OUTPATIENT
Start: 2024-02-21 | End: 2024-02-21 | Stop reason: SDUPTHER

## 2024-02-21 RX ORDER — ONDANSETRON 2 MG/ML
4 INJECTION INTRAMUSCULAR; INTRAVENOUS EVERY 6 HOURS PRN
Status: DISCONTINUED | OUTPATIENT
Start: 2024-02-21 | End: 2024-03-08 | Stop reason: HOSPADM

## 2024-02-21 RX ORDER — CELECOXIB 100 MG/1
200 CAPSULE ORAL
Status: COMPLETED | OUTPATIENT
Start: 2024-02-21 | End: 2024-02-21

## 2024-02-21 RX ORDER — SODIUM CHLORIDE, SODIUM LACTATE, POTASSIUM CHLORIDE, CALCIUM CHLORIDE 600; 310; 30; 20 MG/100ML; MG/100ML; MG/100ML; MG/100ML
INJECTION, SOLUTION INTRAVENOUS CONTINUOUS
Status: DISCONTINUED | OUTPATIENT
Start: 2024-02-21 | End: 2024-02-22

## 2024-02-21 RX ORDER — ALBUMIN, HUMAN INJ 5% 5 %
SOLUTION INTRAVENOUS PRN
Status: DISCONTINUED | OUTPATIENT
Start: 2024-02-21 | End: 2024-02-21 | Stop reason: SDUPTHER

## 2024-02-21 RX ORDER — ONDANSETRON 2 MG/ML
4 INJECTION INTRAMUSCULAR; INTRAVENOUS
Status: DISCONTINUED | OUTPATIENT
Start: 2024-02-21 | End: 2024-02-21 | Stop reason: HOSPADM

## 2024-02-21 RX ORDER — DIPHENHYDRAMINE HYDROCHLORIDE 50 MG/ML
12.5 INJECTION INTRAMUSCULAR; INTRAVENOUS
Status: DISCONTINUED | OUTPATIENT
Start: 2024-02-21 | End: 2024-02-21 | Stop reason: HOSPADM

## 2024-02-21 RX ORDER — MORPHINE SULFATE 1 MG/ML
INJECTION, SOLUTION EPIDURAL; INTRATHECAL; INTRAVENOUS
Status: COMPLETED | OUTPATIENT
Start: 2024-02-21 | End: 2024-02-21

## 2024-02-21 RX ORDER — ACETAMINOPHEN 500 MG
1000 TABLET ORAL EVERY 6 HOURS
Status: DISCONTINUED | OUTPATIENT
Start: 2024-02-21 | End: 2024-02-25

## 2024-02-21 RX ORDER — POTASSIUM CHLORIDE 7.45 MG/ML
10 INJECTION INTRAVENOUS PRN
Status: DISCONTINUED | OUTPATIENT
Start: 2024-02-21 | End: 2024-03-08 | Stop reason: HOSPADM

## 2024-02-21 RX ORDER — SODIUM CHLORIDE 0.9 % (FLUSH) 0.9 %
5-40 SYRINGE (ML) INJECTION EVERY 12 HOURS SCHEDULED
Status: DISCONTINUED | OUTPATIENT
Start: 2024-02-21 | End: 2024-03-08 | Stop reason: HOSPADM

## 2024-02-21 RX ORDER — MAGNESIUM SULFATE IN WATER 40 MG/ML
INJECTION, SOLUTION INTRAVENOUS PRN
Status: DISCONTINUED | OUTPATIENT
Start: 2024-02-21 | End: 2024-02-21 | Stop reason: SDUPTHER

## 2024-02-21 RX ORDER — ROCURONIUM BROMIDE 10 MG/ML
INJECTION, SOLUTION INTRAVENOUS PRN
Status: DISCONTINUED | OUTPATIENT
Start: 2024-02-21 | End: 2024-02-21 | Stop reason: SDUPTHER

## 2024-02-21 RX ORDER — SODIUM CHLORIDE 9 MG/ML
INJECTION, SOLUTION INTRAVENOUS PRN
Status: DISCONTINUED | OUTPATIENT
Start: 2024-02-21 | End: 2024-03-08 | Stop reason: HOSPADM

## 2024-02-21 RX ORDER — ENOXAPARIN SODIUM 100 MG/ML
30 INJECTION SUBCUTANEOUS EVERY 12 HOURS
Status: DISCONTINUED | OUTPATIENT
Start: 2024-02-22 | End: 2024-03-08 | Stop reason: HOSPADM

## 2024-02-21 RX ORDER — ONDANSETRON 2 MG/ML
INJECTION INTRAMUSCULAR; INTRAVENOUS PRN
Status: DISCONTINUED | OUTPATIENT
Start: 2024-02-21 | End: 2024-02-21 | Stop reason: SDUPTHER

## 2024-02-21 RX ORDER — PROPOFOL 10 MG/ML
INJECTION, EMULSION INTRAVENOUS PRN
Status: DISCONTINUED | OUTPATIENT
Start: 2024-02-21 | End: 2024-02-21 | Stop reason: SDUPTHER

## 2024-02-21 RX ORDER — FENTANYL CITRATE 50 UG/ML
INJECTION, SOLUTION INTRAMUSCULAR; INTRAVENOUS PRN
Status: DISCONTINUED | OUTPATIENT
Start: 2024-02-21 | End: 2024-02-21 | Stop reason: SDUPTHER

## 2024-02-21 RX ORDER — ACETAMINOPHEN 500 MG
1000 TABLET ORAL
Status: COMPLETED | OUTPATIENT
Start: 2024-02-21 | End: 2024-02-21

## 2024-02-21 RX ORDER — DEXAMETHASONE SODIUM PHOSPHATE 4 MG/ML
INJECTION, SOLUTION INTRA-ARTICULAR; INTRALESIONAL; INTRAMUSCULAR; INTRAVENOUS; SOFT TISSUE PRN
Status: DISCONTINUED | OUTPATIENT
Start: 2024-02-21 | End: 2024-02-21 | Stop reason: SDUPTHER

## 2024-02-21 RX ORDER — FAMOTIDINE 10 MG/ML
INJECTION, SOLUTION INTRAVENOUS PRN
Status: DISCONTINUED | OUTPATIENT
Start: 2024-02-21 | End: 2024-02-21 | Stop reason: SDUPTHER

## 2024-02-21 RX ADMIN — DEXAMETHASONE SODIUM PHOSPHATE 8 MG: 4 INJECTION INTRA-ARTICULAR; INTRALESIONAL; INTRAMUSCULAR; INTRAVENOUS; SOFT TISSUE at 13:20

## 2024-02-21 RX ADMIN — PROPOFOL 200 MG: 10 INJECTION, EMULSION INTRAVENOUS at 12:55

## 2024-02-21 RX ADMIN — ALBUMIN (HUMAN) 250 ML: 12.5 INJECTION, SOLUTION INTRAVENOUS at 16:29

## 2024-02-21 RX ADMIN — DEXMEDETOMIDINE 10 MCG: 100 INJECTION, SOLUTION INTRAVENOUS at 17:14

## 2024-02-21 RX ADMIN — SODIUM CHLORIDE, PRESERVATIVE FREE 10 ML: 5 INJECTION INTRAVENOUS at 22:12

## 2024-02-21 RX ADMIN — PHENYLEPHRINE HYDROCHLORIDE 120 MCG: 10 INJECTION INTRAVENOUS at 17:59

## 2024-02-21 RX ADMIN — SODIUM CHLORIDE, POTASSIUM CHLORIDE, SODIUM LACTATE AND CALCIUM CHLORIDE: 600; 310; 30; 20 INJECTION, SOLUTION INTRAVENOUS at 22:11

## 2024-02-21 RX ADMIN — KETOROLAC TROMETHAMINE 30 MG: 30 INJECTION INTRAMUSCULAR; INTRAVENOUS at 19:19

## 2024-02-21 RX ADMIN — SODIUM CHLORIDE, SODIUM LACTATE, POTASSIUM CHLORIDE, CALCIUM CHLORIDE: 600; 310; 30; 20 INJECTION, SOLUTION INTRAVENOUS at 17:13

## 2024-02-21 RX ADMIN — ACETAMINOPHEN 1000 MG: 500 TABLET ORAL at 10:34

## 2024-02-21 RX ADMIN — SUGAMMADEX 200 MG: 100 INJECTION, SOLUTION INTRAVENOUS at 17:43

## 2024-02-21 RX ADMIN — CEFOXITIN SODIUM 2000 MG: 2 POWDER, FOR SOLUTION INTRAVENOUS at 15:09

## 2024-02-21 RX ADMIN — CEFOXITIN SODIUM 2000 MG: 2 POWDER, FOR SOLUTION INTRAVENOUS at 13:16

## 2024-02-21 RX ADMIN — OXYCODONE 5 MG: 5 TABLET ORAL at 22:10

## 2024-02-21 RX ADMIN — CEFOXITIN SODIUM 2000 MG: 2 POWDER, FOR SOLUTION INTRAVENOUS at 17:08

## 2024-02-21 RX ADMIN — ROCURONIUM BROMIDE 10 MG: 10 INJECTION, SOLUTION INTRAVENOUS at 14:50

## 2024-02-21 RX ADMIN — FAMOTIDINE 20 MG: 10 INJECTION INTRAVENOUS at 13:16

## 2024-02-21 RX ADMIN — FENTANYL CITRATE 50 MCG: 50 INJECTION, SOLUTION INTRAMUSCULAR; INTRAVENOUS at 12:21

## 2024-02-21 RX ADMIN — ONDANSETRON 4 MG: 2 INJECTION INTRAMUSCULAR; INTRAVENOUS at 13:20

## 2024-02-21 RX ADMIN — SODIUM CHLORIDE, POTASSIUM CHLORIDE, SODIUM LACTATE AND CALCIUM CHLORIDE: 600; 310; 30; 20 INJECTION, SOLUTION INTRAVENOUS at 12:47

## 2024-02-21 RX ADMIN — ACETAMINOPHEN 1000 MG: 500 TABLET ORAL at 22:10

## 2024-02-21 RX ADMIN — PROPOFOL 60 MG: 10 INJECTION, EMULSION INTRAVENOUS at 17:19

## 2024-02-21 RX ADMIN — DEXMEDETOMIDINE 10 MCG: 100 INJECTION, SOLUTION INTRAVENOUS at 17:19

## 2024-02-21 RX ADMIN — PHENYLEPHRINE HYDROCHLORIDE 80 MCG: 10 INJECTION INTRAVENOUS at 16:07

## 2024-02-21 RX ADMIN — PHENYLEPHRINE HYDROCHLORIDE 40 MCG: 10 INJECTION INTRAVENOUS at 16:05

## 2024-02-21 RX ADMIN — LIDOCAINE HYDROCHLORIDE 2 MG/MIN: 4 INJECTION, SOLUTION INTRAVENOUS at 13:02

## 2024-02-21 RX ADMIN — ROCURONIUM BROMIDE 50 MG: 10 INJECTION, SOLUTION INTRAVENOUS at 13:31

## 2024-02-21 RX ADMIN — MIDAZOLAM HYDROCHLORIDE 2 MG: 1 INJECTION, SOLUTION INTRAMUSCULAR; INTRAVENOUS at 12:21

## 2024-02-21 RX ADMIN — SUGAMMADEX 200 MG: 100 INJECTION, SOLUTION INTRAVENOUS at 17:52

## 2024-02-21 RX ADMIN — SODIUM CHLORIDE, SODIUM LACTATE, POTASSIUM CHLORIDE, CALCIUM CHLORIDE: 600; 310; 30; 20 INJECTION, SOLUTION INTRAVENOUS at 12:47

## 2024-02-21 RX ADMIN — MORPHINE SULFATE 0.15 MG: 1 INJECTION, SOLUTION EPIDURAL; INTRATHECAL; INTRAVENOUS at 12:31

## 2024-02-21 RX ADMIN — ONDANSETRON 4 MG: 2 INJECTION INTRAMUSCULAR; INTRAVENOUS at 17:15

## 2024-02-21 RX ADMIN — NALOXEGOL OXALATE 25 MG: 25 TABLET, FILM COATED ORAL at 10:34

## 2024-02-21 RX ADMIN — PROPOFOL 150 MCG/KG/MIN: 10 INJECTION, EMULSION INTRAVENOUS at 17:20

## 2024-02-21 RX ADMIN — PHENYLEPHRINE HYDROCHLORIDE 80 MCG: 10 INJECTION INTRAVENOUS at 16:17

## 2024-02-21 RX ADMIN — ALBUMIN (HUMAN) 12.5 G: 12.5 INJECTION, SOLUTION INTRAVENOUS at 13:24

## 2024-02-21 RX ADMIN — PHENYLEPHRINE HYDROCHLORIDE 40 MCG: 10 INJECTION INTRAVENOUS at 15:35

## 2024-02-21 RX ADMIN — MAGNESIUM SULFATE HEPTAHYDRATE 2000 MG: 40 INJECTION, SOLUTION INTRAVENOUS at 13:07

## 2024-02-21 RX ADMIN — ROCURONIUM BROMIDE 40 MG: 10 INJECTION, SOLUTION INTRAVENOUS at 15:22

## 2024-02-21 RX ADMIN — ROCURONIUM BROMIDE 50 MG: 10 INJECTION, SOLUTION INTRAVENOUS at 12:55

## 2024-02-21 RX ADMIN — FENTANYL CITRATE 50 MCG: 50 INJECTION, SOLUTION INTRAMUSCULAR; INTRAVENOUS at 12:23

## 2024-02-21 RX ADMIN — LIDOCAINE HYDROCHLORIDE 100 MG: 20 INJECTION, SOLUTION EPIDURAL; INFILTRATION; INTRACAUDAL; PERINEURAL at 12:55

## 2024-02-21 RX ADMIN — PHENYLEPHRINE HYDROCHLORIDE 80 MCG: 10 INJECTION INTRAVENOUS at 17:56

## 2024-02-21 RX ADMIN — CELECOXIB 200 MG: 100 CAPSULE ORAL at 10:34

## 2024-02-21 ASSESSMENT — PAIN - FUNCTIONAL ASSESSMENT
PAIN_FUNCTIONAL_ASSESSMENT: ACTIVITIES ARE NOT PREVENTED
PAIN_FUNCTIONAL_ASSESSMENT: ACTIVITIES ARE NOT PREVENTED
PAIN_FUNCTIONAL_ASSESSMENT: 0-10
PAIN_FUNCTIONAL_ASSESSMENT: ACTIVITIES ARE NOT PREVENTED

## 2024-02-21 ASSESSMENT — PAIN SCALES - GENERAL
PAINLEVEL_OUTOF10: 3
PAINLEVEL_OUTOF10: 0
PAINLEVEL_OUTOF10: 3
PAINLEVEL_OUTOF10: 2
PAINLEVEL_OUTOF10: 2
PAINLEVEL_OUTOF10: 0
PAINLEVEL_OUTOF10: 3
PAINLEVEL_OUTOF10: 2
PAINLEVEL_OUTOF10: 5

## 2024-02-21 ASSESSMENT — PAIN DESCRIPTION - PAIN TYPE: TYPE: ACUTE PAIN;SURGICAL PAIN

## 2024-02-21 ASSESSMENT — PAIN DESCRIPTION - DESCRIPTORS
DESCRIPTORS: SORE
DESCRIPTORS: SORE

## 2024-02-21 ASSESSMENT — PAIN DESCRIPTION - LOCATION
LOCATION: ABDOMEN

## 2024-02-21 NOTE — ANESTHESIA PROCEDURE NOTES
Spinal Block    Patient location during procedure: pre-op  End time: 2/21/2024 12:31 PM  Reason for block: post-op pain management, primary anesthetic and at surgeon's request  Staffing  Performed: resident/CRNA   Resident/CRNA: Dori Knapp APRN - CRNA  Performed by: Dori Knapp APRN - LOURDES  Authorized by: Ben Bishop MD    Spinal Block  Patient position: sitting  Prep: DuraPrep  Patient monitoring: cardiac monitor, continuous pulse ox, continuous capnometry, frequent blood pressure checks and oxygen  Approach: midline  Location: L3/L4  Provider prep: mask and sterile gloves  Needle  Needle type: Pencan   Needle gauge: 25 G  Needle length: 3.5 in  Assessment  Swirl obtained: Yes  CSF: clear  Attempts: 1  Hemodynamics: stable  Preanesthetic Checklist  Completed: patient identified, IV checked, site marked, risks and benefits discussed, surgical/procedural consents, pre-op evaluation, timeout performed, anesthesia consent given, oxygen available and monitors applied/VS acknowledged

## 2024-02-21 NOTE — FLOWSHEET NOTE
02/21/24 1636   Family Communication    Relationship to Patient Spouse    Phone Number [phone]  (Gary)   Family/Significant Other Update Called   Delivery Origin Nurse   Message Disposition Family present - message delivered   Update Given Yes   Family Communication   Family Update Message Surgeon working

## 2024-02-21 NOTE — BRIEF OP NOTE
Brief Postoperative Note      Patient: Pily Howell  YOB: 1970  MRN: [id number]    Date of Procedure: 2/21/2024    Pre-Op Diagnosis Codes:     * Attention to colostomy (HCC) [Z43.3]    Post-Op Diagnosis: Same       Procedure(s):  exploratory laparotomy reversal of ostomy, lysis of adhesions.    Surgeon(s):  Francheska Buchanan MD    Assistant:  Surgical Assistant: Ankit Metz; Samuel Leon Hashmatullah    Anesthesia: General    Estimated Blood Loss (mL): 300     Complications: None    Specimens:   ID Type Source Tests Collected by Time Destination   1 : Ostomy and rectal stump Tissue Colostomy SURGICAL PATHOLOGY Francheska Buchanan MD 2/21/2024 1538        Implants:  * No implants in log *      Drains:   Closed/Suction Drain Right RLQ Bulb (Active)       Colostomy LLQ (Active)       Urinary Catheter 02/21/24 2 Way;[place] (Active)   $ Urethral catheter insertion Inserted for procedure 02/21/24 1400   Catheter Indications Perioperative use for selected surgical procedures 02/21/24 1400   Site Assessment Pink;Moist 02/21/24 1400   Urine Color Yellow 02/21/24 1400   Urine Appearance Clear 02/21/24 1400   Collection Container Standard 02/21/24 1400   Securement Method Tape 02/21/24 1400   Catheter Best Practices  Drainage tube clipped to bed;Lack of dependent loop in tubing;Drainage bag less than half full;Catheter secured to thigh;Tamper seal intact;Bag below bladder;Bag not on floor 02/21/24 1400   Status Draining 02/21/24 1400       Findings: dense adhesions throughout abdomen      Electronically signed by Francheska Buchanan MD on 2/21/2024 at 5:55 PM

## 2024-02-21 NOTE — ANESTHESIA PRE PROCEDURE
Department of Anesthesiology  Preprocedure Note       Name:  Pily Howell   Age:  53 y.o.  :  1970                                          MRN:  [id number]         Date:  2024      Surgeon: Surgeon(s):  Francheska Buchanan MD    Procedure: Procedure(s):  ROBOTIC OSTOMY REVERSAL (E.R.A.S.)    Medications prior to admission:   Prior to Admission medications    Medication Sig Start Date End Date Taking? Authorizing Provider   Multiple Vitamin (MULTIVITAMIN ADULT PO) Take by mouth as needed    Mavis Gonsalez MD   polyethylene glycol (MIRALAX) 17 g PACK packet Take 17 g by mouth daily    ProviderMavis MD   acetaminophen (TYLENOL) 500 MG tablet Take 2 tablets by mouth every 6 hours as needed for Fever or Pain    Automatic Reconciliation, Ar   Cetirizine HCl (ZYRTEC ALLERGY) 10 MG CAPS Take 1 tablet by mouth as needed (seasonal allergies)    Automatic Reconciliation, Ar       Current medications:    Current Facility-Administered Medications   Medication Dose Route Frequency Provider Last Rate Last Admin   • cefOXitin (MEFOXIN) 2,000 mg in sterile water 20 mL IV syringe  2,000 mg IntraVENous every 2 hours Francheska Buchanan MD           Allergies:    Allergies   Allergen Reactions   • Guaifenesin Itching   • Penicillins Rash     Age 11    **Tolerated Ancef Challenge       Problem List:    Patient Active Problem List   Diagnosis Code   • Abdominal pain R10.9   • Diverticulitis large intestine w/o perforation or abscess w/bleeding K57.33   • Abscess L02.91   • Bacteremia R78.81   • Postoperative intra-abdominal abscess T81.43XA   • Penicillin allergy Z88.0   • Seizure disorder (HCC) G40.909   • Enterococcal bacteremia R78.81, B95.2   • Leukocytosis D72.829         Past Medical History:        Diagnosis Date   • Contact dermatitis and eczema due to cause    • History of blood transfusion    • History of vascular access device 2023    [facility] VAT 4fr single PICC R basilic 39cm for LT abx

## 2024-02-21 NOTE — H&P
Assessment:   52 yo with hx of perforated diverticulitis  Plan:   OR today for ostomy reversal      Signed By: Francheska Buchanan MD  [facility]  [phone]    February 21, 2024          General Surgery History and Physical    Subjective:      Pily Howell is a 53 y.o.  female who presents with ostomy after anastomotic leak from sigmoid colectomy.  Here now for ostomy reversal.     Past Medical History:   Diagnosis Date    Contact dermatitis and eczema due to cause     History of blood transfusion     History of vascular access device 12/27/2023    [facility] VAT 4fr single PICC R basilic 39cm for LT abx    Muscle pain     neck and shoulder    Seizure (HCC) 09/28/2023    ? seizure with syncopal episode     Past Surgical History:   Procedure Laterality Date    LAPAROSCOPY N/A 12/18/2023    EXPLORATORY LAPAROTOMY, PARTIAL COLECTOMY, OMENECTOMY, ABDOMINAL WASHOUT, CREATION OF OSTOMY performed by Francheska Buchanan MD at [facility] MAIN OR    LAPAROTOMY N/A 12/10/2023    LAPAROTOMY EXPLORATORY, sigmoid colectomy performed by Heather Galindo MD at [facility] MAIN OR      Family History   Problem Relation Age of Onset    Heart Disease Father     Cancer Father     Neuropathy Mother     Mult Sclerosis Mother     Cancer Brother      Social History     Socioeconomic History    Marital status:      Spouse name: None    Number of children: None    Years of education: None    Highest education level: None   Tobacco Use    Smoking status: Never    Smokeless tobacco: Never   Vaping Use    Vaping Use: Never used   Substance and Sexual Activity    Alcohol use: Not Currently    Drug use: Never     Social Determinants of Health     Food Insecurity: No Food Insecurity (12/17/2023)    Hunger Vital Sign     Worried About Running Out of Food in the Last Year: Never true     Ran Out of Food in the Last Year: Never true   Transportation Needs: No Transportation Needs (12/17/2023)    PRAPARE - Transportation     Lack of

## 2024-02-22 LAB
ANION GAP SERPL CALC-SCNC: 8 MMOL/L (ref 5–15)
BUN SERPL-MCNC: 12 MG/DL (ref 6–20)
BUN/CREAT SERPL: 15 (ref 12–20)
CALCIUM SERPL-MCNC: 8.6 MG/DL (ref 8.5–10.1)
CHLORIDE SERPL-SCNC: 111 MMOL/L (ref 97–108)
CO2 SERPL-SCNC: 22 MMOL/L (ref 21–32)
CREAT SERPL-MCNC: 0.82 MG/DL (ref 0.55–1.02)
ERYTHROCYTE [DISTWIDTH] IN BLOOD BY AUTOMATED COUNT: 13.8 % (ref 11.5–14.5)
GLUCOSE SERPL-MCNC: 162 MG/DL (ref 65–100)
HCT VFR BLD AUTO: 33.7 % (ref 35–47)
HGB BLD-MCNC: 10.5 G/DL (ref 11.5–16)
MCH RBC QN AUTO: 26.5 PG (ref 26–34)
MCHC RBC AUTO-ENTMCNC: 31.2 G/DL (ref 30–36.5)
MCV RBC AUTO: 85.1 FL (ref 80–99)
NRBC # BLD: 0 K/UL (ref 0–0.01)
NRBC BLD-RTO: 0 PER 100 WBC
PLATELET # BLD AUTO: 312 K/UL (ref 150–400)
PMV BLD AUTO: 11.8 FL (ref 8.9–12.9)
POTASSIUM SERPL-SCNC: 4.4 MMOL/L (ref 3.5–5.1)
RBC # BLD AUTO: 3.96 M/UL (ref 3.8–5.2)
SODIUM SERPL-SCNC: 141 MMOL/L (ref 136–145)
WBC # BLD AUTO: 13.6 K/UL (ref 3.6–11)

## 2024-02-22 PROCEDURE — 1100000000 HC RM PRIVATE

## 2024-02-22 PROCEDURE — 2580000003 HC RX 258: Performed by: SURGERY

## 2024-02-22 PROCEDURE — 80048 BASIC METABOLIC PNL TOTAL CA: CPT

## 2024-02-22 PROCEDURE — 85027 COMPLETE CBC AUTOMATED: CPT

## 2024-02-22 PROCEDURE — 6370000000 HC RX 637 (ALT 250 FOR IP): Performed by: SURGERY

## 2024-02-22 PROCEDURE — 94761 N-INVAS EAR/PLS OXIMETRY MLT: CPT

## 2024-02-22 PROCEDURE — 6360000002 HC RX W HCPCS: Performed by: SURGERY

## 2024-02-22 PROCEDURE — 2700000000 HC OXYGEN THERAPY PER DAY

## 2024-02-22 PROCEDURE — 36415 COLL VENOUS BLD VENIPUNCTURE: CPT

## 2024-02-22 RX ORDER — DIPHENHYDRAMINE HYDROCHLORIDE 50 MG/ML
25 INJECTION INTRAMUSCULAR; INTRAVENOUS EVERY 6 HOURS PRN
Status: DISCONTINUED | OUTPATIENT
Start: 2024-02-22 | End: 2024-03-08 | Stop reason: HOSPADM

## 2024-02-22 RX ORDER — HYDROMORPHONE HYDROCHLORIDE 1 MG/ML
1 INJECTION, SOLUTION INTRAMUSCULAR; INTRAVENOUS; SUBCUTANEOUS EVERY 4 HOURS PRN
Status: DISCONTINUED | OUTPATIENT
Start: 2024-02-22 | End: 2024-03-08 | Stop reason: HOSPADM

## 2024-02-22 RX ADMIN — OXYCODONE 5 MG: 5 TABLET ORAL at 08:26

## 2024-02-22 RX ADMIN — ENOXAPARIN SODIUM 30 MG: 100 INJECTION SUBCUTANEOUS at 18:39

## 2024-02-22 RX ADMIN — ACETAMINOPHEN 1000 MG: 500 TABLET ORAL at 18:38

## 2024-02-22 RX ADMIN — KETOROLAC TROMETHAMINE 30 MG: 30 INJECTION INTRAMUSCULAR; INTRAVENOUS at 08:26

## 2024-02-22 RX ADMIN — ACETAMINOPHEN 1000 MG: 500 TABLET ORAL at 10:46

## 2024-02-22 RX ADMIN — ONDANSETRON 4 MG: 2 INJECTION INTRAMUSCULAR; INTRAVENOUS at 12:32

## 2024-02-22 RX ADMIN — KETOROLAC TROMETHAMINE 30 MG: 30 INJECTION INTRAMUSCULAR; INTRAVENOUS at 01:18

## 2024-02-22 RX ADMIN — KETOROLAC TROMETHAMINE 30 MG: 30 INJECTION INTRAMUSCULAR; INTRAVENOUS at 20:22

## 2024-02-22 RX ADMIN — ACETAMINOPHEN 1000 MG: 500 TABLET ORAL at 05:35

## 2024-02-22 RX ADMIN — SODIUM CHLORIDE, PRESERVATIVE FREE 10 ML: 5 INJECTION INTRAVENOUS at 08:28

## 2024-02-22 RX ADMIN — OXYCODONE 5 MG: 5 TABLET ORAL at 02:20

## 2024-02-22 RX ADMIN — OXYCODONE 5 MG: 5 TABLET ORAL at 13:44

## 2024-02-22 RX ADMIN — SODIUM CHLORIDE, PRESERVATIVE FREE 10 ML: 5 INJECTION INTRAVENOUS at 20:22

## 2024-02-22 RX ADMIN — KETOROLAC TROMETHAMINE 30 MG: 30 INJECTION INTRAMUSCULAR; INTRAVENOUS at 13:44

## 2024-02-22 RX ADMIN — SODIUM CHLORIDE, POTASSIUM CHLORIDE, SODIUM LACTATE AND CALCIUM CHLORIDE: 600; 310; 30; 20 INJECTION, SOLUTION INTRAVENOUS at 10:47

## 2024-02-22 RX ADMIN — ENOXAPARIN SODIUM 30 MG: 100 INJECTION SUBCUTANEOUS at 05:35

## 2024-02-22 ASSESSMENT — PAIN DESCRIPTION - LOCATION: LOCATION: ABDOMEN

## 2024-02-22 ASSESSMENT — PAIN DESCRIPTION - ORIENTATION: ORIENTATION: MID

## 2024-02-22 ASSESSMENT — PAIN DESCRIPTION - DESCRIPTORS: DESCRIPTORS: SORE

## 2024-02-22 ASSESSMENT — PAIN SCALES - GENERAL: PAINLEVEL_OUTOF10: 3

## 2024-02-22 NOTE — ANESTHESIA POSTPROCEDURE EVALUATION
Department of Anesthesiology  Postprocedure Note    Patient: Pily Howell  MRN: [id number]  YOB: 1970  Date of evaluation: 2/21/2024    Procedure Summary       Date: 02/21/24 Room / Location: [facility] MAIN OR  / [facility] MAIN OR    Anesthesia Start: 1247 Anesthesia Stop: 1829    Procedure: exploratory laparotomy reversal of ostomy, lysis of adhesions. (Abdomen) Diagnosis:       Attention to colostomy (HCC)      (Attention to colostomy (HCC) [Z43.3])    Surgeons: Francheska Buchanan MD Responsible Provider: Ben Bishop MD    Anesthesia Type: General ASA Status: 2            Anesthesia Type: General    Martínez Phase I: Martínez Score: 8    Martínez Phase II:      Anesthesia Post Evaluation    Patient location during evaluation: PACU  Patient participation: complete - patient participated  Level of consciousness: awake  Airway patency: patent  Nausea & Vomiting: no vomiting and no nausea  Cardiovascular status: hemodynamically stable  Respiratory status: acceptable  Hydration status: stable  Pain management: adequate    No notable events documented.

## 2024-02-22 NOTE — CARE COORDINATION
2/22/2024  10:28 AM    No diagnosis found.      General Risk Score: 4   Values used to calculate this score:    Points  Metrics       0        Age: 53       3        Hospital Admissions: 3       1        ED Visits: 1       0        Has Chronic Obstructive Pulmonary Disease: No       0        Has Diabetes: No       0        Has Congestive Heart Failure: No       0        Has Liver Disease: No       0        Has Depression: No       0        Current PCP: Marivel Sky       0        Has Medicaid: No      CM reviewed EMR. No CM needs indicated at this time. Attending reported no anticipated needs at DC. Providers, if needs arise, please consult case management.        02/22/24 1028   Discharge Planning   Current Services Prior To Admission None   Services At/After Discharge   Services At/After Discharge None   Confirm Follow Up Transport Other (see comment)

## 2024-02-23 PROCEDURE — 6370000000 HC RX 637 (ALT 250 FOR IP): Performed by: SURGERY

## 2024-02-23 PROCEDURE — 2580000003 HC RX 258: Performed by: SURGERY

## 2024-02-23 PROCEDURE — 2500000003 HC RX 250 WO HCPCS: Performed by: SURGERY

## 2024-02-23 PROCEDURE — 1100000000 HC RM PRIVATE

## 2024-02-23 PROCEDURE — 6360000002 HC RX W HCPCS: Performed by: SURGERY

## 2024-02-23 PROCEDURE — 94761 N-INVAS EAR/PLS OXIMETRY MLT: CPT

## 2024-02-23 RX ORDER — OXYCODONE HYDROCHLORIDE 5 MG/1
10 TABLET ORAL EVERY 4 HOURS PRN
Status: DISCONTINUED | OUTPATIENT
Start: 2024-02-23 | End: 2024-03-08 | Stop reason: HOSPADM

## 2024-02-23 RX ORDER — DOCUSATE SODIUM 100 MG/1
100 CAPSULE, LIQUID FILLED ORAL 2 TIMES DAILY
Status: DISCONTINUED | OUTPATIENT
Start: 2024-02-23 | End: 2024-03-08 | Stop reason: HOSPADM

## 2024-02-23 RX ADMIN — ENOXAPARIN SODIUM 30 MG: 100 INJECTION SUBCUTANEOUS at 06:03

## 2024-02-23 RX ADMIN — SODIUM CHLORIDE, PRESERVATIVE FREE 10 ML: 5 INJECTION INTRAVENOUS at 08:53

## 2024-02-23 RX ADMIN — DOCUSATE SODIUM 100 MG: 100 CAPSULE, LIQUID FILLED ORAL at 20:12

## 2024-02-23 RX ADMIN — HYDROMORPHONE HYDROCHLORIDE 1 MG: 1 INJECTION, SOLUTION INTRAMUSCULAR; INTRAVENOUS; SUBCUTANEOUS at 19:16

## 2024-02-23 RX ADMIN — HYDROMORPHONE HYDROCHLORIDE 1 MG: 1 INJECTION, SOLUTION INTRAMUSCULAR; INTRAVENOUS; SUBCUTANEOUS at 23:23

## 2024-02-23 RX ADMIN — ENOXAPARIN SODIUM 30 MG: 100 INJECTION SUBCUTANEOUS at 18:16

## 2024-02-23 RX ADMIN — OXYCODONE 5 MG: 5 TABLET ORAL at 06:03

## 2024-02-23 RX ADMIN — SODIUM CHLORIDE, PRESERVATIVE FREE 10 ML: 5 INJECTION INTRAVENOUS at 20:12

## 2024-02-23 RX ADMIN — HYDROMORPHONE HYDROCHLORIDE 1 MG: 1 INJECTION, SOLUTION INTRAMUSCULAR; INTRAVENOUS; SUBCUTANEOUS at 14:44

## 2024-02-23 RX ADMIN — OXYCODONE 5 MG: 5 TABLET ORAL at 10:32

## 2024-02-23 RX ADMIN — OXYCODONE 5 MG: 5 TABLET ORAL at 00:05

## 2024-02-23 RX ADMIN — DOCUSATE SODIUM 100 MG: 100 CAPSULE, LIQUID FILLED ORAL at 11:41

## 2024-02-23 RX ADMIN — DIPHENHYDRAMINE HYDROCHLORIDE 25 MG: 50 INJECTION INTRAMUSCULAR; INTRAVENOUS at 20:12

## 2024-02-23 ASSESSMENT — PAIN SCALES - GENERAL
PAINLEVEL_OUTOF10: 7
PAINLEVEL_OUTOF10: 3
PAINLEVEL_OUTOF10: 5
PAINLEVEL_OUTOF10: 5

## 2024-02-23 ASSESSMENT — PAIN DESCRIPTION - LOCATION
LOCATION: ABDOMEN
LOCATION: ABDOMEN

## 2024-02-23 NOTE — CARE COORDINATION
2/23/2024  9:18 AM    No diagnosis found.      General Risk Score: 4   Values used to calculate this score:    Points  Metrics       0        Age: 53       3        Hospital Admissions: 3       1        ED Visits: 1       0        Has Chronic Obstructive Pulmonary Disease: No       0        Has Diabetes: No       0        Has Congestive Heart Failure: No       0        Has Liver Disease: No       0        Has Depression: No       0        Current PCP: Marivel Sky       0        Has Medicaid: No      CM reviewed EMR. No CM needs indicated at this time. Attending reported no anticipated needs at DC. Providers, if needs arise, please consult case management.        02/22/24 1028   Discharge Planning   Current Services Prior To Admission None   Services At/After Discharge   Services At/After Discharge None   Confirm Follow Up Transport Other (see comment)

## 2024-02-24 PROCEDURE — 6370000000 HC RX 637 (ALT 250 FOR IP): Performed by: SURGERY

## 2024-02-24 PROCEDURE — 94761 N-INVAS EAR/PLS OXIMETRY MLT: CPT

## 2024-02-24 PROCEDURE — 1100000000 HC RM PRIVATE

## 2024-02-24 PROCEDURE — 2500000003 HC RX 250 WO HCPCS: Performed by: SURGERY

## 2024-02-24 PROCEDURE — 2700000000 HC OXYGEN THERAPY PER DAY

## 2024-02-24 PROCEDURE — 2580000003 HC RX 258: Performed by: SURGERY

## 2024-02-24 PROCEDURE — 6360000002 HC RX W HCPCS: Performed by: SURGERY

## 2024-02-24 RX ADMIN — DOCUSATE SODIUM 100 MG: 100 CAPSULE, LIQUID FILLED ORAL at 08:39

## 2024-02-24 RX ADMIN — DOCUSATE SODIUM 100 MG: 100 CAPSULE, LIQUID FILLED ORAL at 19:44

## 2024-02-24 RX ADMIN — HYDROMORPHONE HYDROCHLORIDE 1 MG: 1 INJECTION, SOLUTION INTRAMUSCULAR; INTRAVENOUS; SUBCUTANEOUS at 12:48

## 2024-02-24 RX ADMIN — HYDROMORPHONE HYDROCHLORIDE 1 MG: 1 INJECTION, SOLUTION INTRAMUSCULAR; INTRAVENOUS; SUBCUTANEOUS at 03:33

## 2024-02-24 RX ADMIN — OXYCODONE 10 MG: 5 TABLET ORAL at 19:44

## 2024-02-24 RX ADMIN — ACETAMINOPHEN 1000 MG: 500 TABLET ORAL at 22:42

## 2024-02-24 RX ADMIN — ONDANSETRON 4 MG: 2 INJECTION INTRAMUSCULAR; INTRAVENOUS at 21:11

## 2024-02-24 RX ADMIN — HYDROMORPHONE HYDROCHLORIDE 1 MG: 1 INJECTION, SOLUTION INTRAMUSCULAR; INTRAVENOUS; SUBCUTANEOUS at 08:38

## 2024-02-24 RX ADMIN — HYDROMORPHONE HYDROCHLORIDE 1 MG: 1 INJECTION, SOLUTION INTRAMUSCULAR; INTRAVENOUS; SUBCUTANEOUS at 16:58

## 2024-02-24 RX ADMIN — SODIUM CHLORIDE, PRESERVATIVE FREE 10 ML: 5 INJECTION INTRAVENOUS at 13:17

## 2024-02-24 RX ADMIN — DIPHENHYDRAMINE HYDROCHLORIDE 25 MG: 50 INJECTION INTRAMUSCULAR; INTRAVENOUS at 06:35

## 2024-02-24 RX ADMIN — ENOXAPARIN SODIUM 30 MG: 100 INJECTION SUBCUTANEOUS at 16:58

## 2024-02-24 RX ADMIN — HYDROMORPHONE HYDROCHLORIDE 1 MG: 1 INJECTION, SOLUTION INTRAMUSCULAR; INTRAVENOUS; SUBCUTANEOUS at 21:00

## 2024-02-24 RX ADMIN — ENOXAPARIN SODIUM 30 MG: 100 INJECTION SUBCUTANEOUS at 06:33

## 2024-02-24 ASSESSMENT — PAIN SCALES - GENERAL
PAINLEVEL_OUTOF10: 7
PAINLEVEL_OUTOF10: 7
PAINLEVEL_OUTOF10: 9
PAINLEVEL_OUTOF10: 7

## 2024-02-24 ASSESSMENT — PAIN DESCRIPTION - LOCATION
LOCATION: ABDOMEN

## 2024-02-25 LAB
ANION GAP SERPL CALC-SCNC: 3 MMOL/L (ref 5–15)
BASOPHILS # BLD: 0 K/UL (ref 0–0.1)
BASOPHILS NFR BLD: 0 % (ref 0–1)
BUN SERPL-MCNC: 7 MG/DL (ref 6–20)
BUN/CREAT SERPL: 14 (ref 12–20)
CALCIUM SERPL-MCNC: 8.8 MG/DL (ref 8.5–10.1)
CHLORIDE SERPL-SCNC: 103 MMOL/L (ref 97–108)
CO2 SERPL-SCNC: 29 MMOL/L (ref 21–32)
CREAT SERPL-MCNC: 0.51 MG/DL (ref 0.55–1.02)
DIFFERENTIAL METHOD BLD: ABNORMAL
EOSINOPHIL # BLD: 0.2 K/UL (ref 0–0.4)
EOSINOPHIL NFR BLD: 3 % (ref 0–7)
ERYTHROCYTE [DISTWIDTH] IN BLOOD BY AUTOMATED COUNT: 14 % (ref 11.5–14.5)
GLUCOSE SERPL-MCNC: 106 MG/DL (ref 65–100)
HCT VFR BLD AUTO: 23.5 % (ref 35–47)
HGB BLD-MCNC: 7.3 G/DL (ref 11.5–16)
IMM GRANULOCYTES # BLD AUTO: 0 K/UL (ref 0–0.04)
IMM GRANULOCYTES NFR BLD AUTO: 1 % (ref 0–0.5)
LACTATE SERPL-SCNC: 1.1 MMOL/L (ref 0.4–2)
LYMPHOCYTES # BLD: 1.5 K/UL (ref 0.8–3.5)
LYMPHOCYTES NFR BLD: 17 % (ref 12–49)
MCH RBC QN AUTO: 26.8 PG (ref 26–34)
MCHC RBC AUTO-ENTMCNC: 31.1 G/DL (ref 30–36.5)
MCV RBC AUTO: 86.4 FL (ref 80–99)
MONOCYTES # BLD: 0.5 K/UL (ref 0–1)
MONOCYTES NFR BLD: 5 % (ref 5–13)
NEUTS SEG # BLD: 6.5 K/UL (ref 1.8–8)
NEUTS SEG NFR BLD: 74 % (ref 32–75)
NRBC # BLD: 0 K/UL (ref 0–0.01)
NRBC BLD-RTO: 0 PER 100 WBC
PLATELET # BLD AUTO: 293 K/UL (ref 150–400)
PMV BLD AUTO: 11.2 FL (ref 8.9–12.9)
POTASSIUM SERPL-SCNC: 3.1 MMOL/L (ref 3.5–5.1)
RBC # BLD AUTO: 2.72 M/UL (ref 3.8–5.2)
SODIUM SERPL-SCNC: 135 MMOL/L (ref 136–145)
WBC # BLD AUTO: 8.8 K/UL (ref 3.6–11)

## 2024-02-25 PROCEDURE — 94761 N-INVAS EAR/PLS OXIMETRY MLT: CPT

## 2024-02-25 PROCEDURE — 80048 BASIC METABOLIC PNL TOTAL CA: CPT

## 2024-02-25 PROCEDURE — 85025 COMPLETE CBC W/AUTO DIFF WBC: CPT

## 2024-02-25 PROCEDURE — 6370000000 HC RX 637 (ALT 250 FOR IP): Performed by: SURGERY

## 2024-02-25 PROCEDURE — 6360000002 HC RX W HCPCS: Performed by: SURGERY

## 2024-02-25 PROCEDURE — 36415 COLL VENOUS BLD VENIPUNCTURE: CPT

## 2024-02-25 PROCEDURE — 2500000003 HC RX 250 WO HCPCS: Performed by: SURGERY

## 2024-02-25 PROCEDURE — 2700000000 HC OXYGEN THERAPY PER DAY

## 2024-02-25 PROCEDURE — 83605 ASSAY OF LACTIC ACID: CPT

## 2024-02-25 PROCEDURE — 1100000000 HC RM PRIVATE

## 2024-02-25 PROCEDURE — 2580000003 HC RX 258: Performed by: SURGERY

## 2024-02-25 RX ORDER — ACETAMINOPHEN 160 MG/5ML
650 LIQUID ORAL EVERY 4 HOURS PRN
Status: DISCONTINUED | OUTPATIENT
Start: 2024-02-25 | End: 2024-03-05

## 2024-02-25 RX ADMIN — SODIUM CHLORIDE, PRESERVATIVE FREE 10 ML: 5 INJECTION INTRAVENOUS at 04:33

## 2024-02-25 RX ADMIN — POTASSIUM CHLORIDE 10 MEQ: 7.46 INJECTION, SOLUTION INTRAVENOUS at 04:32

## 2024-02-25 RX ADMIN — POTASSIUM CHLORIDE 10 MEQ: 7.46 INJECTION, SOLUTION INTRAVENOUS at 06:46

## 2024-02-25 RX ADMIN — NALOXEGOL OXALATE 12.5 MG: 25 TABLET, FILM COATED ORAL at 14:14

## 2024-02-25 RX ADMIN — SODIUM CHLORIDE, PRESERVATIVE FREE 10 ML: 5 INJECTION INTRAVENOUS at 07:53

## 2024-02-25 RX ADMIN — POTASSIUM CHLORIDE 10 MEQ: 7.46 INJECTION, SOLUTION INTRAVENOUS at 05:50

## 2024-02-25 RX ADMIN — ACETAMINOPHEN 1000 MG: 500 TABLET ORAL at 09:12

## 2024-02-25 RX ADMIN — OXYCODONE 10 MG: 5 TABLET ORAL at 04:19

## 2024-02-25 RX ADMIN — OXYCODONE 10 MG: 5 TABLET ORAL at 09:12

## 2024-02-25 RX ADMIN — ACETAMINOPHEN 650 MG: 160 SOLUTION ORAL at 20:22

## 2024-02-25 RX ADMIN — HYDROMORPHONE HYDROCHLORIDE 1 MG: 1 INJECTION, SOLUTION INTRAMUSCULAR; INTRAVENOUS; SUBCUTANEOUS at 00:50

## 2024-02-25 RX ADMIN — DOCUSATE SODIUM 100 MG: 100 CAPSULE, LIQUID FILLED ORAL at 20:22

## 2024-02-25 RX ADMIN — ENOXAPARIN SODIUM 30 MG: 100 INJECTION SUBCUTANEOUS at 05:51

## 2024-02-25 RX ADMIN — HYDROMORPHONE HYDROCHLORIDE 1 MG: 1 INJECTION, SOLUTION INTRAMUSCULAR; INTRAVENOUS; SUBCUTANEOUS at 05:51

## 2024-02-25 RX ADMIN — SODIUM CHLORIDE: 9 INJECTION, SOLUTION INTRAVENOUS at 04:33

## 2024-02-25 RX ADMIN — SODIUM CHLORIDE, PRESERVATIVE FREE 10 ML: 5 INJECTION INTRAVENOUS at 20:23

## 2024-02-25 RX ADMIN — HYDROMORPHONE HYDROCHLORIDE 1 MG: 1 INJECTION, SOLUTION INTRAMUSCULAR; INTRAVENOUS; SUBCUTANEOUS at 20:23

## 2024-02-25 RX ADMIN — ENOXAPARIN SODIUM 30 MG: 100 INJECTION SUBCUTANEOUS at 18:43

## 2024-02-25 RX ADMIN — DOCUSATE SODIUM 100 MG: 100 CAPSULE, LIQUID FILLED ORAL at 09:12

## 2024-02-25 RX ADMIN — OXYCODONE 10 MG: 5 TABLET ORAL at 00:19

## 2024-02-25 RX ADMIN — POTASSIUM CHLORIDE 10 MEQ: 7.46 INJECTION, SOLUTION INTRAVENOUS at 07:51

## 2024-02-25 RX ADMIN — OXYCODONE 10 MG: 5 TABLET ORAL at 18:47

## 2024-02-25 RX ADMIN — HYDROMORPHONE HYDROCHLORIDE 1 MG: 1 INJECTION, SOLUTION INTRAMUSCULAR; INTRAVENOUS; SUBCUTANEOUS at 14:14

## 2024-02-25 ASSESSMENT — PAIN SCALES - GENERAL
PAINLEVEL_OUTOF10: 7
PAINLEVEL_OUTOF10: 4
PAINLEVEL_OUTOF10: 7
PAINLEVEL_OUTOF10: 7

## 2024-02-25 ASSESSMENT — PAIN DESCRIPTION - LOCATION
LOCATION: ABDOMEN

## 2024-02-26 LAB
ANION GAP SERPL CALC-SCNC: 5 MMOL/L (ref 5–15)
BASOPHILS # BLD: 0 K/UL (ref 0–0.1)
BASOPHILS NFR BLD: 1 % (ref 0–1)
BUN SERPL-MCNC: 4 MG/DL (ref 6–20)
BUN/CREAT SERPL: 10 (ref 12–20)
CALCIUM SERPL-MCNC: 8.4 MG/DL (ref 8.5–10.1)
CHLORIDE SERPL-SCNC: 104 MMOL/L (ref 97–108)
CO2 SERPL-SCNC: 29 MMOL/L (ref 21–32)
CREAT SERPL-MCNC: 0.42 MG/DL (ref 0.55–1.02)
DIFFERENTIAL METHOD BLD: ABNORMAL
EOSINOPHIL # BLD: 0.2 K/UL (ref 0–0.4)
EOSINOPHIL NFR BLD: 3 % (ref 0–7)
ERYTHROCYTE [DISTWIDTH] IN BLOOD BY AUTOMATED COUNT: 13.8 % (ref 11.5–14.5)
GLUCOSE SERPL-MCNC: 106 MG/DL (ref 65–100)
HCT VFR BLD AUTO: 20.9 % (ref 35–47)
HGB BLD-MCNC: 6.5 G/DL (ref 11.5–16)
IMM GRANULOCYTES # BLD AUTO: 0 K/UL (ref 0–0.04)
IMM GRANULOCYTES NFR BLD AUTO: 0 % (ref 0–0.5)
LACTATE SERPL-SCNC: 0.5 MMOL/L (ref 0.4–2)
LYMPHOCYTES # BLD: 0.9 K/UL (ref 0.8–3.5)
LYMPHOCYTES NFR BLD: 14 % (ref 12–49)
MCH RBC QN AUTO: 26.5 PG (ref 26–34)
MCHC RBC AUTO-ENTMCNC: 31.1 G/DL (ref 30–36.5)
MCV RBC AUTO: 85.3 FL (ref 80–99)
MONOCYTES # BLD: 0.5 K/UL (ref 0–1)
MONOCYTES NFR BLD: 8 % (ref 5–13)
NEUTS SEG # BLD: 5 K/UL (ref 1.8–8)
NEUTS SEG NFR BLD: 75 % (ref 32–75)
NRBC # BLD: 0 K/UL (ref 0–0.01)
NRBC BLD-RTO: 0 PER 100 WBC
PLATELET # BLD AUTO: 255 K/UL (ref 150–400)
PMV BLD AUTO: 10.1 FL (ref 8.9–12.9)
POTASSIUM SERPL-SCNC: 3.4 MMOL/L (ref 3.5–5.1)
RBC # BLD AUTO: 2.45 M/UL (ref 3.8–5.2)
SODIUM SERPL-SCNC: 138 MMOL/L (ref 136–145)
WBC # BLD AUTO: 6.7 K/UL (ref 3.6–11)

## 2024-02-26 PROCEDURE — 6360000002 HC RX W HCPCS: Performed by: SURGERY

## 2024-02-26 PROCEDURE — 83605 ASSAY OF LACTIC ACID: CPT

## 2024-02-26 PROCEDURE — 1100000000 HC RM PRIVATE

## 2024-02-26 PROCEDURE — 6370000000 HC RX 637 (ALT 250 FOR IP): Performed by: SURGERY

## 2024-02-26 PROCEDURE — 36415 COLL VENOUS BLD VENIPUNCTURE: CPT

## 2024-02-26 PROCEDURE — 97530 THERAPEUTIC ACTIVITIES: CPT

## 2024-02-26 PROCEDURE — 94761 N-INVAS EAR/PLS OXIMETRY MLT: CPT

## 2024-02-26 PROCEDURE — 97161 PT EVAL LOW COMPLEX 20 MIN: CPT

## 2024-02-26 PROCEDURE — 80048 BASIC METABOLIC PNL TOTAL CA: CPT

## 2024-02-26 PROCEDURE — 85025 COMPLETE CBC W/AUTO DIFF WBC: CPT

## 2024-02-26 PROCEDURE — 2580000003 HC RX 258: Performed by: SURGERY

## 2024-02-26 PROCEDURE — 2700000000 HC OXYGEN THERAPY PER DAY

## 2024-02-26 RX ORDER — POLYETHYLENE GLYCOL 3350 17 G/17G
17 POWDER, FOR SOLUTION ORAL DAILY
Status: DISCONTINUED | OUTPATIENT
Start: 2024-02-26 | End: 2024-02-28

## 2024-02-26 RX ORDER — POLYETHYLENE GLYCOL 3350 17 G/17G
17 POWDER, FOR SOLUTION ORAL ONCE
Status: COMPLETED | OUTPATIENT
Start: 2024-02-26 | End: 2024-02-26

## 2024-02-26 RX ADMIN — NALOXEGOL OXALATE 12.5 MG: 25 TABLET, FILM COATED ORAL at 06:19

## 2024-02-26 RX ADMIN — ACETAMINOPHEN 650 MG: 160 SOLUTION ORAL at 06:23

## 2024-02-26 RX ADMIN — ENOXAPARIN SODIUM 30 MG: 100 INJECTION SUBCUTANEOUS at 06:18

## 2024-02-26 RX ADMIN — POLYETHYLENE GLYCOL 3350 17 G: 17 POWDER, FOR SOLUTION ORAL at 09:43

## 2024-02-26 RX ADMIN — OXYCODONE 10 MG: 5 TABLET ORAL at 00:31

## 2024-02-26 RX ADMIN — OXYCODONE 10 MG: 5 TABLET ORAL at 22:36

## 2024-02-26 RX ADMIN — OXYCODONE 5 MG: 5 TABLET ORAL at 14:10

## 2024-02-26 RX ADMIN — OXYCODONE 10 MG: 5 TABLET ORAL at 18:40

## 2024-02-26 RX ADMIN — DOCUSATE SODIUM 100 MG: 100 CAPSULE, LIQUID FILLED ORAL at 09:43

## 2024-02-26 RX ADMIN — ACETAMINOPHEN 650 MG: 160 SOLUTION ORAL at 15:46

## 2024-02-26 RX ADMIN — ENOXAPARIN SODIUM 30 MG: 100 INJECTION SUBCUTANEOUS at 18:41

## 2024-02-26 RX ADMIN — OXYCODONE 10 MG: 5 TABLET ORAL at 09:43

## 2024-02-26 RX ADMIN — SODIUM CHLORIDE, PRESERVATIVE FREE 10 ML: 5 INJECTION INTRAVENOUS at 21:00

## 2024-02-26 RX ADMIN — DOCUSATE SODIUM 100 MG: 100 CAPSULE, LIQUID FILLED ORAL at 21:00

## 2024-02-26 RX ADMIN — POLYETHYLENE GLYCOL 3350 17 G: 17 POWDER, FOR SOLUTION ORAL at 16:06

## 2024-02-26 ASSESSMENT — PAIN DESCRIPTION - DESCRIPTORS: DESCRIPTORS: ACHING;SORE

## 2024-02-26 ASSESSMENT — PAIN SCALES - GENERAL: PAINLEVEL_OUTOF10: 7

## 2024-02-26 ASSESSMENT — PAIN DESCRIPTION - LOCATION: LOCATION: ABDOMEN

## 2024-02-26 NOTE — CARE COORDINATION
2/26/2024  10:09 AM    No diagnosis found.      General Risk Score: 4   Values used to calculate this score:    Points  Metrics       0        Age: 53       3        Hospital Admissions: 3       1        ED Visits: 1       0        Has Chronic Obstructive Pulmonary Disease: No       0        Has Diabetes: No       0        Has Congestive Heart Failure: No       0        Has Liver Disease: No       0        Has Depression: No       0        Current PCP: Marivel Sky       0        Has Medicaid: No      CM reviewed EMR. No CM needs indicated at this time. Attending reported no anticipated needs at DC. Providers, if needs arise, please consult case management.        02/22/24 1028   Discharge Planning   Current Services Prior To Admission None   Services At/After Discharge   Services At/After Discharge None   Confirm Follow Up Transport Other (see comment)

## 2024-02-27 LAB
BASOPHILS # BLD: 0 K/UL (ref 0–0.1)
BASOPHILS NFR BLD: 0 % (ref 0–1)
DIFFERENTIAL METHOD BLD: ABNORMAL
EOSINOPHIL # BLD: 0.4 K/UL (ref 0–0.4)
EOSINOPHIL NFR BLD: 3 % (ref 0–7)
ERYTHROCYTE [DISTWIDTH] IN BLOOD BY AUTOMATED COUNT: 14.3 % (ref 11.5–14.5)
HCT VFR BLD AUTO: 26.7 % (ref 35–47)
HGB BLD-MCNC: 8.4 G/DL (ref 11.5–16)
IMM GRANULOCYTES # BLD AUTO: 0.1 K/UL (ref 0–0.04)
IMM GRANULOCYTES NFR BLD AUTO: 1 % (ref 0–0.5)
LYMPHOCYTES # BLD: 1.4 K/UL (ref 0.8–3.5)
LYMPHOCYTES NFR BLD: 13 % (ref 12–49)
MCH RBC QN AUTO: 26.6 PG (ref 26–34)
MCHC RBC AUTO-ENTMCNC: 31.5 G/DL (ref 30–36.5)
MCV RBC AUTO: 84.5 FL (ref 80–99)
MONOCYTES # BLD: 0.7 K/UL (ref 0–1)
MONOCYTES NFR BLD: 6 % (ref 5–13)
NEUTS SEG # BLD: 8.8 K/UL (ref 1.8–8)
NEUTS SEG NFR BLD: 77 % (ref 32–75)
NRBC # BLD: 0 K/UL (ref 0–0.01)
NRBC BLD-RTO: 0 PER 100 WBC
PLATELET # BLD AUTO: 343 K/UL (ref 150–400)
RBC # BLD AUTO: 3.16 M/UL (ref 3.8–5.2)
WBC # BLD AUTO: 11.4 K/UL (ref 3.6–11)

## 2024-02-27 PROCEDURE — 1100000000 HC RM PRIVATE

## 2024-02-27 PROCEDURE — 6370000000 HC RX 637 (ALT 250 FOR IP): Performed by: SURGERY

## 2024-02-27 PROCEDURE — 97116 GAIT TRAINING THERAPY: CPT

## 2024-02-27 PROCEDURE — 97530 THERAPEUTIC ACTIVITIES: CPT

## 2024-02-27 PROCEDURE — 36415 COLL VENOUS BLD VENIPUNCTURE: CPT

## 2024-02-27 PROCEDURE — 85025 COMPLETE CBC W/AUTO DIFF WBC: CPT

## 2024-02-27 PROCEDURE — 2700000000 HC OXYGEN THERAPY PER DAY

## 2024-02-27 PROCEDURE — 6360000002 HC RX W HCPCS: Performed by: SURGERY

## 2024-02-27 PROCEDURE — 94761 N-INVAS EAR/PLS OXIMETRY MLT: CPT

## 2024-02-27 PROCEDURE — 2580000003 HC RX 258: Performed by: SURGERY

## 2024-02-27 RX ADMIN — ENOXAPARIN SODIUM 30 MG: 100 INJECTION SUBCUTANEOUS at 17:30

## 2024-02-27 RX ADMIN — OXYCODONE 10 MG: 5 TABLET ORAL at 08:06

## 2024-02-27 RX ADMIN — SODIUM CHLORIDE, PRESERVATIVE FREE 10 ML: 5 INJECTION INTRAVENOUS at 08:06

## 2024-02-27 RX ADMIN — OXYCODONE 10 MG: 5 TABLET ORAL at 22:21

## 2024-02-27 RX ADMIN — OXYCODONE 10 MG: 5 TABLET ORAL at 17:30

## 2024-02-27 RX ADMIN — DOCUSATE SODIUM 100 MG: 100 CAPSULE, LIQUID FILLED ORAL at 20:57

## 2024-02-27 RX ADMIN — DOCUSATE SODIUM 100 MG: 100 CAPSULE, LIQUID FILLED ORAL at 08:06

## 2024-02-27 RX ADMIN — NALOXEGOL OXALATE 12.5 MG: 25 TABLET, FILM COATED ORAL at 06:21

## 2024-02-27 RX ADMIN — ENOXAPARIN SODIUM 30 MG: 100 INJECTION SUBCUTANEOUS at 06:21

## 2024-02-27 RX ADMIN — SODIUM CHLORIDE, PRESERVATIVE FREE 10 ML: 5 INJECTION INTRAVENOUS at 20:57

## 2024-02-27 RX ADMIN — POLYETHYLENE GLYCOL 3350 17 G: 17 POWDER, FOR SOLUTION ORAL at 08:06

## 2024-02-27 RX ADMIN — OXYCODONE 10 MG: 5 TABLET ORAL at 12:41

## 2024-02-27 RX ADMIN — OXYCODONE 10 MG: 5 TABLET ORAL at 03:00

## 2024-02-27 RX ADMIN — ACETAMINOPHEN 650 MG: 160 SOLUTION ORAL at 21:06

## 2024-02-27 RX ADMIN — ACETAMINOPHEN 650 MG: 160 SOLUTION ORAL at 15:05

## 2024-02-27 ASSESSMENT — PAIN DESCRIPTION - LOCATION
LOCATION: ABDOMEN
LOCATION: ABDOMEN

## 2024-02-27 ASSESSMENT — PAIN DESCRIPTION - DESCRIPTORS
DESCRIPTORS: ACHING;CRAMPING
DESCRIPTORS: CRAMPING;ACHING

## 2024-02-27 ASSESSMENT — PAIN SCALES - GENERAL
PAINLEVEL_OUTOF10: 7
PAINLEVEL_OUTOF10: 6
PAINLEVEL_OUTOF10: 6

## 2024-02-27 ASSESSMENT — PAIN DESCRIPTION - ORIENTATION
ORIENTATION: LOWER;MID
ORIENTATION: LOWER;MID

## 2024-02-28 ENCOUNTER — APPOINTMENT (OUTPATIENT)
Facility: HOSPITAL | Age: 54
DRG: 330 | End: 2024-02-28
Attending: SURGERY
Payer: COMMERCIAL

## 2024-02-28 ENCOUNTER — ANESTHESIA (OUTPATIENT)
Facility: HOSPITAL | Age: 54
End: 2024-02-28
Payer: COMMERCIAL

## 2024-02-28 ENCOUNTER — ANESTHESIA EVENT (OUTPATIENT)
Facility: HOSPITAL | Age: 54
End: 2024-02-28
Payer: COMMERCIAL

## 2024-02-28 LAB
ANION GAP SERPL CALC-SCNC: 7 MMOL/L (ref 5–15)
BUN SERPL-MCNC: 5 MG/DL (ref 6–20)
BUN/CREAT SERPL: 11 (ref 12–20)
CALCIUM SERPL-MCNC: 8.9 MG/DL (ref 8.5–10.1)
CHLORIDE SERPL-SCNC: 102 MMOL/L (ref 97–108)
CO2 SERPL-SCNC: 30 MMOL/L (ref 21–32)
CREAT SERPL-MCNC: 0.44 MG/DL (ref 0.55–1.02)
ERYTHROCYTE [DISTWIDTH] IN BLOOD BY AUTOMATED COUNT: 14.7 % (ref 11.5–14.5)
GLUCOSE SERPL-MCNC: 105 MG/DL (ref 65–100)
HCT VFR BLD AUTO: 21.7 % (ref 35–47)
HGB BLD-MCNC: 6.8 G/DL (ref 11.5–16)
HISTORY CHECK: NORMAL
HISTORY CHECK: NORMAL
MCH RBC QN AUTO: 26.6 PG (ref 26–34)
MCHC RBC AUTO-ENTMCNC: 31.3 G/DL (ref 30–36.5)
MCV RBC AUTO: 84.8 FL (ref 80–99)
NRBC # BLD: 0 K/UL (ref 0–0.01)
NRBC BLD-RTO: 0 PER 100 WBC
PLATELET # BLD AUTO: 343 K/UL (ref 150–400)
PMV BLD AUTO: 10.7 FL (ref 8.9–12.9)
POTASSIUM SERPL-SCNC: 3.5 MMOL/L (ref 3.5–5.1)
RBC # BLD AUTO: 2.56 M/UL (ref 3.8–5.2)
SODIUM SERPL-SCNC: 139 MMOL/L (ref 136–145)
WBC # BLD AUTO: 8.5 K/UL (ref 3.6–11)

## 2024-02-28 PROCEDURE — 80048 BASIC METABOLIC PNL TOTAL CA: CPT

## 2024-02-28 PROCEDURE — 6360000002 HC RX W HCPCS: Performed by: NURSE ANESTHETIST, CERTIFIED REGISTERED

## 2024-02-28 PROCEDURE — 6370000000 HC RX 637 (ALT 250 FOR IP): Performed by: SURGERY

## 2024-02-28 PROCEDURE — 0D1B0Z4 BYPASS ILEUM TO CUTANEOUS, OPEN APPROACH: ICD-10-PCS | Performed by: SURGERY

## 2024-02-28 PROCEDURE — P9016 RBC LEUKOCYTES REDUCED: HCPCS

## 2024-02-28 PROCEDURE — 2720000010 HC SURG SUPPLY STERILE: Performed by: SURGERY

## 2024-02-28 PROCEDURE — 2580000003 HC RX 258: Performed by: ANESTHESIOLOGY

## 2024-02-28 PROCEDURE — 86850 RBC ANTIBODY SCREEN: CPT

## 2024-02-28 PROCEDURE — 36415 COLL VENOUS BLD VENIPUNCTURE: CPT

## 2024-02-28 PROCEDURE — 3600000014 HC SURGERY LEVEL 4 ADDTL 15MIN: Performed by: SURGERY

## 2024-02-28 PROCEDURE — 97116 GAIT TRAINING THERAPY: CPT

## 2024-02-28 PROCEDURE — 3700000000 HC ANESTHESIA ATTENDED CARE: Performed by: SURGERY

## 2024-02-28 PROCEDURE — 86900 BLOOD TYPING SEROLOGIC ABO: CPT

## 2024-02-28 PROCEDURE — C9290 INJ, BUPIVACAINE LIPOSOME: HCPCS | Performed by: SURGERY

## 2024-02-28 PROCEDURE — 6360000004 HC RX CONTRAST MEDICATION: Performed by: SURGERY

## 2024-02-28 PROCEDURE — 6360000002 HC RX W HCPCS: Performed by: SURGERY

## 2024-02-28 PROCEDURE — 2709999900 HC NON-CHARGEABLE SUPPLY: Performed by: SURGERY

## 2024-02-28 PROCEDURE — 7100000000 HC PACU RECOVERY - FIRST 15 MIN: Performed by: SURGERY

## 2024-02-28 PROCEDURE — 86923 COMPATIBILITY TEST ELECTRIC: CPT

## 2024-02-28 PROCEDURE — 2580000003 HC RX 258: Performed by: NURSE ANESTHETIST, CERTIFIED REGISTERED

## 2024-02-28 PROCEDURE — 2500000003 HC RX 250 WO HCPCS: Performed by: NURSE ANESTHETIST, CERTIFIED REGISTERED

## 2024-02-28 PROCEDURE — 2580000003 HC RX 258: Performed by: SURGERY

## 2024-02-28 PROCEDURE — 74177 CT ABD & PELVIS W/CONTRAST: CPT

## 2024-02-28 PROCEDURE — 3600000004 HC SURGERY LEVEL 4 BASE: Performed by: SURGERY

## 2024-02-28 PROCEDURE — 7100000001 HC PACU RECOVERY - ADDTL 15 MIN: Performed by: SURGERY

## 2024-02-28 PROCEDURE — 3700000001 HC ADD 15 MINUTES (ANESTHESIA): Performed by: SURGERY

## 2024-02-28 PROCEDURE — 94761 N-INVAS EAR/PLS OXIMETRY MLT: CPT

## 2024-02-28 PROCEDURE — 0DQL0ZZ REPAIR TRANSVERSE COLON, OPEN APPROACH: ICD-10-PCS | Performed by: SURGERY

## 2024-02-28 PROCEDURE — 1100000000 HC RM PRIVATE

## 2024-02-28 PROCEDURE — 86901 BLOOD TYPING SEROLOGIC RH(D): CPT

## 2024-02-28 PROCEDURE — 0DQM0ZZ REPAIR DESCENDING COLON, OPEN APPROACH: ICD-10-PCS | Performed by: SURGERY

## 2024-02-28 PROCEDURE — 85027 COMPLETE CBC AUTOMATED: CPT

## 2024-02-28 RX ORDER — SUCCINYLCHOLINE/SOD CL,ISO/PF 100 MG/5ML
SYRINGE (ML) INTRAVENOUS PRN
Status: DISCONTINUED | OUTPATIENT
Start: 2024-02-28 | End: 2024-02-28 | Stop reason: SDUPTHER

## 2024-02-28 RX ORDER — ONDANSETRON 2 MG/ML
4 INJECTION INTRAMUSCULAR; INTRAVENOUS
Status: DISCONTINUED | OUTPATIENT
Start: 2024-02-28 | End: 2024-02-28 | Stop reason: HOSPADM

## 2024-02-28 RX ORDER — LIDOCAINE HYDROCHLORIDE 10 MG/ML
1 INJECTION, SOLUTION EPIDURAL; INFILTRATION; INTRACAUDAL; PERINEURAL
Status: DISCONTINUED | OUTPATIENT
Start: 2024-02-28 | End: 2024-02-28 | Stop reason: HOSPADM

## 2024-02-28 RX ORDER — MIDAZOLAM HYDROCHLORIDE 2 MG/2ML
2 INJECTION, SOLUTION INTRAMUSCULAR; INTRAVENOUS
Status: DISCONTINUED | OUTPATIENT
Start: 2024-02-28 | End: 2024-02-28 | Stop reason: HOSPADM

## 2024-02-28 RX ORDER — SODIUM CHLORIDE 9 MG/ML
INJECTION, SOLUTION INTRAVENOUS PRN
Status: DISCONTINUED | OUTPATIENT
Start: 2024-02-28 | End: 2024-03-04

## 2024-02-28 RX ORDER — FENTANYL CITRATE 50 UG/ML
INJECTION, SOLUTION INTRAMUSCULAR; INTRAVENOUS PRN
Status: DISCONTINUED | OUTPATIENT
Start: 2024-02-28 | End: 2024-02-28 | Stop reason: SDUPTHER

## 2024-02-28 RX ORDER — POLYETHYLENE GLYCOL 3350 17 G/17G
17 POWDER, FOR SOLUTION ORAL DAILY PRN
Status: DISCONTINUED | OUTPATIENT
Start: 2024-02-28 | End: 2024-03-05

## 2024-02-28 RX ORDER — HYDROMORPHONE HYDROCHLORIDE 2 MG/ML
INJECTION, SOLUTION INTRAMUSCULAR; INTRAVENOUS; SUBCUTANEOUS PRN
Status: DISCONTINUED | OUTPATIENT
Start: 2024-02-28 | End: 2024-02-28 | Stop reason: SDUPTHER

## 2024-02-28 RX ORDER — DEXMEDETOMIDINE HYDROCHLORIDE 100 UG/ML
INJECTION, SOLUTION INTRAVENOUS PRN
Status: DISCONTINUED | OUTPATIENT
Start: 2024-02-28 | End: 2024-02-28 | Stop reason: SDUPTHER

## 2024-02-28 RX ORDER — NALOXONE HYDROCHLORIDE 0.4 MG/ML
INJECTION, SOLUTION INTRAMUSCULAR; INTRAVENOUS; SUBCUTANEOUS PRN
Status: DISCONTINUED | OUTPATIENT
Start: 2024-02-28 | End: 2024-03-05

## 2024-02-28 RX ORDER — PROPOFOL 10 MG/ML
INJECTION, EMULSION INTRAVENOUS PRN
Status: DISCONTINUED | OUTPATIENT
Start: 2024-02-28 | End: 2024-02-28 | Stop reason: SDUPTHER

## 2024-02-28 RX ORDER — SODIUM CHLORIDE, SODIUM LACTATE, POTASSIUM CHLORIDE, CALCIUM CHLORIDE 600; 310; 30; 20 MG/100ML; MG/100ML; MG/100ML; MG/100ML
INJECTION, SOLUTION INTRAVENOUS CONTINUOUS PRN
Status: DISCONTINUED | OUTPATIENT
Start: 2024-02-28 | End: 2024-02-28 | Stop reason: SDUPTHER

## 2024-02-28 RX ORDER — DIPHENHYDRAMINE HYDROCHLORIDE 50 MG/ML
12.5 INJECTION INTRAMUSCULAR; INTRAVENOUS
Status: DISCONTINUED | OUTPATIENT
Start: 2024-02-28 | End: 2024-02-28 | Stop reason: HOSPADM

## 2024-02-28 RX ORDER — SODIUM CHLORIDE, SODIUM LACTATE, POTASSIUM CHLORIDE, CALCIUM CHLORIDE 600; 310; 30; 20 MG/100ML; MG/100ML; MG/100ML; MG/100ML
INJECTION, SOLUTION INTRAVENOUS CONTINUOUS
Status: DISCONTINUED | OUTPATIENT
Start: 2024-02-28 | End: 2024-02-28 | Stop reason: HOSPADM

## 2024-02-28 RX ORDER — MIDAZOLAM HYDROCHLORIDE 1 MG/ML
INJECTION INTRAMUSCULAR; INTRAVENOUS PRN
Status: DISCONTINUED | OUTPATIENT
Start: 2024-02-28 | End: 2024-02-28 | Stop reason: SDUPTHER

## 2024-02-28 RX ORDER — METRONIDAZOLE 500 MG/100ML
500 INJECTION, SOLUTION INTRAVENOUS EVERY 8 HOURS
Status: DISCONTINUED | OUTPATIENT
Start: 2024-02-29 | End: 2024-03-05

## 2024-02-28 RX ORDER — SODIUM CHLORIDE, SODIUM LACTATE, POTASSIUM CHLORIDE, CALCIUM CHLORIDE 600; 310; 30; 20 MG/100ML; MG/100ML; MG/100ML; MG/100ML
INJECTION, SOLUTION INTRAVENOUS CONTINUOUS
Status: DISCONTINUED | OUTPATIENT
Start: 2024-02-28 | End: 2024-03-08 | Stop reason: HOSPADM

## 2024-02-28 RX ORDER — ONDANSETRON 2 MG/ML
INJECTION INTRAMUSCULAR; INTRAVENOUS PRN
Status: DISCONTINUED | OUTPATIENT
Start: 2024-02-28 | End: 2024-02-28 | Stop reason: SDUPTHER

## 2024-02-28 RX ORDER — SODIUM CHLORIDE 9 MG/ML
INJECTION, SOLUTION INTRAVENOUS PRN
Status: DISCONTINUED | OUTPATIENT
Start: 2024-02-28 | End: 2024-02-28

## 2024-02-28 RX ORDER — LEVOFLOXACIN 5 MG/ML
750 INJECTION, SOLUTION INTRAVENOUS EVERY 24 HOURS
Status: DISCONTINUED | OUTPATIENT
Start: 2024-02-28 | End: 2024-03-02

## 2024-02-28 RX ORDER — FENTANYL CITRATE 50 UG/ML
100 INJECTION, SOLUTION INTRAMUSCULAR; INTRAVENOUS
Status: DISCONTINUED | OUTPATIENT
Start: 2024-02-28 | End: 2024-02-28 | Stop reason: HOSPADM

## 2024-02-28 RX ORDER — ROCURONIUM BROMIDE 10 MG/ML
INJECTION, SOLUTION INTRAVENOUS PRN
Status: DISCONTINUED | OUTPATIENT
Start: 2024-02-28 | End: 2024-02-28 | Stop reason: SDUPTHER

## 2024-02-28 RX ORDER — PHENYLEPHRINE HCL IN 0.9% NACL 0.4MG/10ML
SYRINGE (ML) INTRAVENOUS PRN
Status: DISCONTINUED | OUTPATIENT
Start: 2024-02-28 | End: 2024-02-28 | Stop reason: SDUPTHER

## 2024-02-28 RX ORDER — METRONIDAZOLE 500 MG/100ML
500 INJECTION, SOLUTION INTRAVENOUS ONCE
Status: COMPLETED | OUTPATIENT
Start: 2024-02-28 | End: 2024-02-28

## 2024-02-28 RX ORDER — DEXAMETHASONE SODIUM PHOSPHATE 4 MG/ML
INJECTION, SOLUTION INTRA-ARTICULAR; INTRALESIONAL; INTRAMUSCULAR; INTRAVENOUS; SOFT TISSUE PRN
Status: DISCONTINUED | OUTPATIENT
Start: 2024-02-28 | End: 2024-02-28 | Stop reason: SDUPTHER

## 2024-02-28 RX ADMIN — HYDROMORPHONE HYDROCHLORIDE 0.4 MG: 2 INJECTION, SOLUTION INTRAMUSCULAR; INTRAVENOUS; SUBCUTANEOUS at 19:45

## 2024-02-28 RX ADMIN — SUGAMMADEX 200 MG: 100 INJECTION, SOLUTION INTRAVENOUS at 21:55

## 2024-02-28 RX ADMIN — FENTANYL CITRATE 50 MCG: 50 INJECTION, SOLUTION INTRAMUSCULAR; INTRAVENOUS at 18:59

## 2024-02-28 RX ADMIN — OXYCODONE 10 MG: 5 TABLET ORAL at 04:46

## 2024-02-28 RX ADMIN — SODIUM CHLORIDE, PRESERVATIVE FREE 10 ML: 5 INJECTION INTRAVENOUS at 08:46

## 2024-02-28 RX ADMIN — HYDROMORPHONE HYDROCHLORIDE 0.4 MG: 2 INJECTION, SOLUTION INTRAMUSCULAR; INTRAVENOUS; SUBCUTANEOUS at 19:24

## 2024-02-28 RX ADMIN — DEXMEDETOMIDINE 8 MCG: 100 INJECTION, SOLUTION INTRAVENOUS at 19:31

## 2024-02-28 RX ADMIN — SODIUM CHLORIDE, POTASSIUM CHLORIDE, SODIUM LACTATE AND CALCIUM CHLORIDE: 600; 310; 30; 20 INJECTION, SOLUTION INTRAVENOUS at 23:02

## 2024-02-28 RX ADMIN — MIDAZOLAM HYDROCHLORIDE 2 MG: 1 INJECTION, SOLUTION INTRAMUSCULAR; INTRAVENOUS at 18:52

## 2024-02-28 RX ADMIN — CEFEPIME 2000 MG: 2 INJECTION, POWDER, FOR SOLUTION INTRAVENOUS at 19:11

## 2024-02-28 RX ADMIN — Medication 140 MG: at 19:00

## 2024-02-28 RX ADMIN — ENOXAPARIN SODIUM 30 MG: 100 INJECTION SUBCUTANEOUS at 08:42

## 2024-02-28 RX ADMIN — Medication 80 MCG: at 19:21

## 2024-02-28 RX ADMIN — HYDROMORPHONE HYDROCHLORIDE 0.3 MG: 2 INJECTION, SOLUTION INTRAMUSCULAR; INTRAVENOUS; SUBCUTANEOUS at 21:48

## 2024-02-28 RX ADMIN — HYDROMORPHONE HYDROCHLORIDE 0.3 MG: 2 INJECTION, SOLUTION INTRAMUSCULAR; INTRAVENOUS; SUBCUTANEOUS at 21:54

## 2024-02-28 RX ADMIN — SODIUM CHLORIDE, POTASSIUM CHLORIDE, SODIUM LACTATE AND CALCIUM CHLORIDE: 600; 310; 30; 20 INJECTION, SOLUTION INTRAVENOUS at 18:52

## 2024-02-28 RX ADMIN — DOCUSATE SODIUM 100 MG: 100 CAPSULE, LIQUID FILLED ORAL at 08:42

## 2024-02-28 RX ADMIN — ROCURONIUM BROMIDE 50 MG: 10 INJECTION INTRAVENOUS at 19:14

## 2024-02-28 RX ADMIN — DEXAMETHASONE SODIUM PHOSPHATE 4 MG: 4 INJECTION, SOLUTION INTRAMUSCULAR; INTRAVENOUS at 19:11

## 2024-02-28 RX ADMIN — PROPOFOL 200 MG: 10 INJECTION, EMULSION INTRAVENOUS at 19:00

## 2024-02-28 RX ADMIN — HYDROMORPHONE HYDROCHLORIDE 0.2 MG: 2 INJECTION, SOLUTION INTRAMUSCULAR; INTRAVENOUS; SUBCUTANEOUS at 20:36

## 2024-02-28 RX ADMIN — ROCURONIUM BROMIDE 10 MG: 10 INJECTION INTRAVENOUS at 20:36

## 2024-02-28 RX ADMIN — OXYCODONE 10 MG: 5 TABLET ORAL at 13:43

## 2024-02-28 RX ADMIN — PROPOFOL 50 MCG/KG/MIN: 10 INJECTION, EMULSION INTRAVENOUS at 19:04

## 2024-02-28 RX ADMIN — LIDOCAINE HYDROCHLORIDE 60 MG: 20 INJECTION, SOLUTION EPIDURAL; INFILTRATION; INTRACAUDAL; PERINEURAL at 18:59

## 2024-02-28 RX ADMIN — POLYETHYLENE GLYCOL 3350 17 G: 17 POWDER, FOR SOLUTION ORAL at 08:42

## 2024-02-28 RX ADMIN — NALOXEGOL OXALATE 12.5 MG: 25 TABLET, FILM COATED ORAL at 06:41

## 2024-02-28 RX ADMIN — DEXMEDETOMIDINE 4 MCG: 100 INJECTION, SOLUTION INTRAVENOUS at 19:52

## 2024-02-28 RX ADMIN — OXYCODONE 10 MG: 5 TABLET ORAL at 09:14

## 2024-02-28 RX ADMIN — SODIUM CHLORIDE, POTASSIUM CHLORIDE, SODIUM LACTATE AND CALCIUM CHLORIDE: 600; 310; 30; 20 INJECTION, SOLUTION INTRAVENOUS at 20:19

## 2024-02-28 RX ADMIN — DIATRIZOATE MEGLUMINE AND DIATRIZOATE SODIUM 30 ML: 660; 100 LIQUID ORAL; RECTAL at 10:24

## 2024-02-28 RX ADMIN — METRONIDAZOLE 500 MG: 500 INJECTION, SOLUTION INTRAVENOUS at 19:14

## 2024-02-28 RX ADMIN — DEXMEDETOMIDINE 8 MCG: 100 INJECTION, SOLUTION INTRAVENOUS at 21:54

## 2024-02-28 RX ADMIN — ONDANSETRON HYDROCHLORIDE 4 MG: 2 SOLUTION INTRAMUSCULAR; INTRAVENOUS at 18:52

## 2024-02-28 RX ADMIN — Medication: at 23:03

## 2024-02-28 RX ADMIN — HYDROMORPHONE HYDROCHLORIDE 0.4 MG: 2 INJECTION, SOLUTION INTRAMUSCULAR; INTRAVENOUS; SUBCUTANEOUS at 19:33

## 2024-02-28 RX ADMIN — IOPAMIDOL 100 ML: 755 INJECTION, SOLUTION INTRAVENOUS at 13:01

## 2024-02-28 RX ADMIN — FENTANYL CITRATE 50 MCG: 50 INJECTION, SOLUTION INTRAMUSCULAR; INTRAVENOUS at 18:55

## 2024-02-28 RX ADMIN — ROCURONIUM BROMIDE 10 MG: 10 INJECTION INTRAVENOUS at 20:12

## 2024-02-28 ASSESSMENT — PAIN SCALES - GENERAL: PAINLEVEL_OUTOF10: 3

## 2024-02-28 ASSESSMENT — PAIN - FUNCTIONAL ASSESSMENT: PAIN_FUNCTIONAL_ASSESSMENT: 0-10

## 2024-02-28 NOTE — CARE COORDINATION
2/28/2024  2:44 PM  Care Management Progress Note      RUR:  16%  Risk Level: []Low [x]Moderate []High    Transition of care plan:  Awaiting medical clearance and DC order. To OR today again.  TBD pending medical progression.  Outpatient follow-up.  Pt's family to transport.

## 2024-02-28 NOTE — ANESTHESIA PRE PROCEDURE
Department of Anesthesiology  Preprocedure Note       Name:  Pily Howell   Age:  53 y.o.  :  1970                                          MRN:  [id number]         Date:  2024      Surgeon: Surgeon(s):  Francheska Buchanan MD    Procedure: Procedure(s):  LAPAROTOMY EXPLORATORY FOR ANASTOMOTIC LEAK    Medications prior to admission:   Prior to Admission medications    Medication Sig Start Date End Date Taking? Authorizing Provider   Multiple Vitamin (MULTIVITAMIN ADULT PO) Take by mouth as needed    Mavis Gonsalez MD   polyethylene glycol (MIRALAX) 17 g PACK packet Take 17 g by mouth daily    Maivs Gonsalez MD   acetaminophen (TYLENOL) 500 MG tablet Take 2 tablets by mouth every 6 hours as needed for Fever or Pain    Automatic Reconciliation, Ar   Cetirizine HCl (ZYRTEC ALLERGY) 10 MG CAPS Take 1 tablet by mouth as needed (seasonal allergies)    Automatic Reconciliation, Ar       Current medications:    Current Facility-Administered Medications   Medication Dose Route Frequency Provider Last Rate Last Admin   • diatrizoate meglumine-sodium (GASTROGRAFIN) 66-10 % solution 30 mL  30 mL Oral ONCE PRN Francheska Buchanan MD   30 mL at 24 1024   • 0.9 % sodium chloride infusion   IntraVENous PRN Francheska Buchanan MD       • ceFEPIme (MAXIPIME) 2,000 mg in sodium chloride 0.9 % 100 mL IVPB (mini-bag)  2,000 mg IntraVENous NOW Francheska Buchanan MD       • metroNIDAZOLE (FLAGYL) 500 mg in 0.9% NaCl 100 mL IVPB premix  500 mg IntraVENous Once Francheska Buchanan MD       • polyethylene glycol (GLYCOLAX) packet 17 g  17 g Oral Daily Francheska Buchanan MD   17 g at 24 0842   • acetaminophen (TYLENOL) 160 MG/5ML solution 650 mg  650 mg Oral Q4H PRN Sachin Kwok MD   650 mg at 24 2106   • naloxegol (MOVANTIK) tablet 12.5 mg  12.5 mg Oral QAM AC Sachin Kwok MD   12.5 mg at 24 0641   • oxyCODONE (ROXICODONE) immediate release tablet 10 mg  10 mg Oral

## 2024-02-29 LAB
ABO + RH BLD: NORMAL
ANION GAP SERPL CALC-SCNC: 7 MMOL/L (ref 5–15)
BLD PROD TYP BPU: NORMAL
BLD PROD TYP BPU: NORMAL
BLOOD BANK DISPENSE STATUS: NORMAL
BLOOD BANK DISPENSE STATUS: NORMAL
BLOOD GROUP ANTIBODIES SERPL: NORMAL
BPU ID: NORMAL
BPU ID: NORMAL
BUN SERPL-MCNC: 6 MG/DL (ref 6–20)
BUN/CREAT SERPL: 16 (ref 12–20)
CALCIUM SERPL-MCNC: 8.2 MG/DL (ref 8.5–10.1)
CHLORIDE SERPL-SCNC: 104 MMOL/L (ref 97–108)
CO2 SERPL-SCNC: 25 MMOL/L (ref 21–32)
CREAT SERPL-MCNC: 0.38 MG/DL (ref 0.55–1.02)
CROSSMATCH RESULT: NORMAL
CROSSMATCH RESULT: NORMAL
ERYTHROCYTE [DISTWIDTH] IN BLOOD BY AUTOMATED COUNT: 14.5 % (ref 11.5–14.5)
GLUCOSE SERPL-MCNC: 182 MG/DL (ref 65–100)
HCT VFR BLD AUTO: 31 % (ref 35–47)
HGB BLD-MCNC: 9.9 G/DL (ref 11.5–16)
MAGNESIUM SERPL-MCNC: 2 MG/DL (ref 1.6–2.4)
MCH RBC QN AUTO: 26.8 PG (ref 26–34)
MCHC RBC AUTO-ENTMCNC: 31.9 G/DL (ref 30–36.5)
MCV RBC AUTO: 84 FL (ref 80–99)
NRBC # BLD: 0 K/UL (ref 0–0.01)
NRBC BLD-RTO: 0 PER 100 WBC
PHOSPHATE SERPL-MCNC: 3.7 MG/DL (ref 2.6–4.7)
PLATELET # BLD AUTO: 451 K/UL (ref 150–400)
PMV BLD AUTO: 9.4 FL (ref 8.9–12.9)
POTASSIUM SERPL-SCNC: 3.6 MMOL/L (ref 3.5–5.1)
RBC # BLD AUTO: 3.69 M/UL (ref 3.8–5.2)
SODIUM SERPL-SCNC: 136 MMOL/L (ref 136–145)
SPECIMEN EXP DATE BLD: NORMAL
UNIT DIVISION: 0
UNIT DIVISION: 0
WBC # BLD AUTO: 14.6 K/UL (ref 3.6–11)

## 2024-02-29 PROCEDURE — 6370000000 HC RX 637 (ALT 250 FOR IP): Performed by: SURGERY

## 2024-02-29 PROCEDURE — 80048 BASIC METABOLIC PNL TOTAL CA: CPT

## 2024-02-29 PROCEDURE — 2580000003 HC RX 258: Performed by: ANESTHESIOLOGY

## 2024-02-29 PROCEDURE — 36415 COLL VENOUS BLD VENIPUNCTURE: CPT

## 2024-02-29 PROCEDURE — 94761 N-INVAS EAR/PLS OXIMETRY MLT: CPT

## 2024-02-29 PROCEDURE — 6360000002 HC RX W HCPCS: Performed by: SURGERY

## 2024-02-29 PROCEDURE — 85027 COMPLETE CBC AUTOMATED: CPT

## 2024-02-29 PROCEDURE — 84100 ASSAY OF PHOSPHORUS: CPT

## 2024-02-29 PROCEDURE — 1100000000 HC RM PRIVATE

## 2024-02-29 PROCEDURE — 83735 ASSAY OF MAGNESIUM: CPT

## 2024-02-29 PROCEDURE — 97116 GAIT TRAINING THERAPY: CPT

## 2024-02-29 PROCEDURE — 97530 THERAPEUTIC ACTIVITIES: CPT

## 2024-02-29 PROCEDURE — 2580000003 HC RX 258: Performed by: SURGERY

## 2024-02-29 RX ORDER — ACETAMINOPHEN 500 MG
1000 TABLET ORAL EVERY 6 HOURS SCHEDULED
Status: DISCONTINUED | OUTPATIENT
Start: 2024-02-29 | End: 2024-03-05

## 2024-02-29 RX ORDER — KETOROLAC TROMETHAMINE 30 MG/ML
30 INJECTION, SOLUTION INTRAMUSCULAR; INTRAVENOUS EVERY 6 HOURS
Status: DISPENSED | OUTPATIENT
Start: 2024-02-29 | End: 2024-03-05

## 2024-02-29 RX ADMIN — LEVOFLOXACIN 750 MG: 5 INJECTION, SOLUTION INTRAVENOUS at 01:47

## 2024-02-29 RX ADMIN — KETOROLAC TROMETHAMINE 30 MG: 30 INJECTION INTRAMUSCULAR; INTRAVENOUS at 21:38

## 2024-02-29 RX ADMIN — ENOXAPARIN SODIUM 30 MG: 100 INJECTION SUBCUTANEOUS at 21:37

## 2024-02-29 RX ADMIN — METRONIDAZOLE 500 MG: 500 INJECTION, SOLUTION INTRAVENOUS at 10:29

## 2024-02-29 RX ADMIN — DOCUSATE SODIUM 100 MG: 100 CAPSULE, LIQUID FILLED ORAL at 21:38

## 2024-02-29 RX ADMIN — DOCUSATE SODIUM 100 MG: 100 CAPSULE, LIQUID FILLED ORAL at 01:50

## 2024-02-29 RX ADMIN — DOCUSATE SODIUM 100 MG: 100 CAPSULE, LIQUID FILLED ORAL at 10:27

## 2024-02-29 RX ADMIN — ACETAMINOPHEN 650 MG: 160 SOLUTION ORAL at 21:36

## 2024-02-29 RX ADMIN — ENOXAPARIN SODIUM 30 MG: 100 INJECTION SUBCUTANEOUS at 01:49

## 2024-02-29 RX ADMIN — METRONIDAZOLE 500 MG: 500 INJECTION, SOLUTION INTRAVENOUS at 18:52

## 2024-02-29 RX ADMIN — NALOXEGOL OXALATE 12.5 MG: 25 TABLET, FILM COATED ORAL at 06:44

## 2024-02-29 RX ADMIN — ENOXAPARIN SODIUM 30 MG: 100 INJECTION SUBCUTANEOUS at 10:29

## 2024-02-29 RX ADMIN — KETOROLAC TROMETHAMINE 30 MG: 30 INJECTION INTRAMUSCULAR; INTRAVENOUS at 17:01

## 2024-02-29 RX ADMIN — SODIUM CHLORIDE, POTASSIUM CHLORIDE, SODIUM LACTATE AND CALCIUM CHLORIDE: 600; 310; 30; 20 INJECTION, SOLUTION INTRAVENOUS at 10:28

## 2024-02-29 RX ADMIN — SODIUM CHLORIDE, PRESERVATIVE FREE 10 ML: 5 INJECTION INTRAVENOUS at 10:27

## 2024-02-29 RX ADMIN — METRONIDAZOLE 500 MG: 500 INJECTION, SOLUTION INTRAVENOUS at 03:53

## 2024-02-29 RX ADMIN — SODIUM CHLORIDE, PRESERVATIVE FREE 10 ML: 5 INJECTION INTRAVENOUS at 21:38

## 2024-02-29 ASSESSMENT — PAIN DESCRIPTION - LOCATION: LOCATION: ABDOMEN

## 2024-02-29 ASSESSMENT — PAIN SCALES - GENERAL
PAINLEVEL_OUTOF10: 0
PAINLEVEL_OUTOF10: 3
PAINLEVEL_OUTOF10: 0

## 2024-02-29 NOTE — ANESTHESIA POSTPROCEDURE EVALUATION
Department of Anesthesiology  Postprocedure Note    Patient: Pily Howell  MRN: [id number]  YOB: 1970  Date of evaluation: 2/28/2024    Procedure Summary       Date: 02/28/24 Room / Location: [facility] MAIN OR F6 / [facility] MAIN OR    Anesthesia Start: 1849 Anesthesia Stop: 2234    Procedure: LAPAROTOMY EXPLORATORY, REPAIR OF BOWEL PERFORATION, CREATION OF DIVERTING PERIOSTOMY (Abdomen) Diagnosis:       Anastomotic leak of intestine      (Anastomotic leak of intestine [K91.89])    Surgeons: Francheska Buchanan MD Responsible Provider: Corina Darnell MD    Anesthesia Type: general ASA Status: 2 - Emergent            Anesthesia Type: No value filed.    Martínez Phase I: Martínez Score: 8    Martínez Phase II:      Anesthesia Post Evaluation    Patient location during evaluation: PACU  Patient participation: complete - patient participated  Level of consciousness: awake  Airway patency: patent  Nausea & Vomiting: no vomiting and no nausea  Cardiovascular status: hemodynamically stable  Respiratory status: acceptable  Hydration status: stable  Pain management: adequate    No notable events documented.

## 2024-02-29 NOTE — BRIEF OP NOTE
Brief Postoperative Note      Patient: Pily Howell  YOB: 1970  MRN: [id number]    Date of Procedure: 2/28/2024    Pre-Op Diagnosis Codes:     * Anastomotic leak of intestine [K91.89]    Post-Op Diagnosis:  colon perforation       Procedure(s):  LAPAROTOMY EXPLORATORY, REPAIR OF BOWEL PERFORATION, CREATION OF DIVERTING PERIOSTOMY    Surgeon(s):  Francheska Buchanan MD    Assistant:  Surgical Assistant: Naren Ruiz    Anesthesia: General    Estimated Blood Loss (mL): 300     Complications: None    Specimens:   * No specimens in log *    Implants:  * No implants in log *      Drains:   Closed/Suction Drain Lateral LLQ;Abdomen Bulb (Active)   Site Description Clean, dry & intact 02/28/24 2044   Dressing Status New dressing applied 02/28/24 2044   Drainage Appearance Serosanguinous 02/28/24 2044   Drain Status Compressed;To bulb suction 02/28/24 2044       Closed/Suction Drain RLQ;Abdomen Bulb (Active)   Site Description Clean, dry & intact 02/28/24 2044   Dressing Status New dressing applied 02/28/24 2044   Drainage Appearance Serosanguinous 02/28/24 2044   Drain Status Compressed;To bulb suction 02/28/24 2044       Colostomy LLQ (Active)       Colostomy RLQ (Active)       Urinary Catheter 02/28/24 2 Way;[place] (Active)   $ Urethral catheter insertion Inserted for procedure 02/28/24 1930   Catheter Indications Perioperative use for selected surgical procedures 02/28/24 1930   Site Assessment Pink;Moist;Other (Comment) 02/28/24 1930   Urine Color Miryam 02/28/24 1930   Urine Appearance Clear 02/28/24 1930   Status Draining;Patent 02/28/24 1930       [REMOVED] Closed/Suction Drain Right RLQ Bulb (Removed)   Site Description Clean, dry & intact 02/28/24 1503   Dressing Status Clean, dry & intact 02/28/24 1503   Drainage Appearance Serosanguinous 02/28/24 1503   Drain Status To bulb suction 02/28/24 1503   Output (ml) 0 ml 02/27/24 0637       [REMOVED] Urinary Catheter 02/21/24 2 Way;[place]

## 2024-02-29 NOTE — WOUND CARE
Ostomy Progress Note:  First postoperative visit.  Type of Ostomy-LAPAROTOMY EXPLORATORY, REPAIR OF BOWEL PERFORATION, CREATION OF DIVERTING PERIOSTOMY   Location:[facility]  Date of Surgery:2/28  Surgeon:     Patient has had  recent colostomy and has understanding of ostomy care. Will return and educate of ileostomy care and assessment of stoma tomorrow when patient in less pain.    Recommendations:  Empty appliance when 1/3 full and PRN. Encourage patient/family to notify nurse and assist w/ pouch emptying to promote self care. Change appliance twice weekly and PRN for leaking ASAP. DO NOT REINFORCE LEAKS to avoid skin breakdown.  Transition of Care:  Ostomy nurse to return and continue teaching.  Tati Elmore RN  Ostomy/wound

## 2024-02-29 NOTE — DISCHARGE INSTR - DIET
can be taken with food or without food.  Plan to have your largest meal in the middle of the day. Do not eat large amounts in the evening. This can help decrease stool output at night, so that you can limit emptying the ostomy bag then.  Eat foods that may thicken stool several times a day. Some foods can change the color of the stool. (See the Food Selection Guide.)  When you begin to add more variety back into your diet, add only 1 new food every few days. If there are foods that bothered you before surgery, add back other foods first and eat only a small portion size when you re-try this food. If a food doesn’t do well, wait a few weeks and then you can re-try it. Keep a log of foods you try and how you feel when eating them.  To reduce gas, avoid chewing gum, drinking with straws and drinking carbonated beverages, smoking or chewing tobacco, eating too fast, and skipping meals. Missing meals can cause the small intestine to be more active and increase gas and watery stools. The “lag time” from eating a gas-producing food to actual release of gas is 2 to 4 hours for ileostomy patients.   Some foods may cause blockages (see Food Selection Guide). Use caution when eating these foods. Eat small amounts only and chew your foods well to help with better absorption of nutrients and prevent blockage.    Get enough fluids. Aim for at least 8 to 10 cups of liquid per day. Drink liquids 30 minutes after meals or snacks to avoid flushing foods through quickly, which prevents good absorption.    The average output for an ileostomy patient ranges between 500 and 1,300 milliliters in a day. Right after surgery and during gastroenteritis (an infection or irritation of the stomach and intestines), the output can be 1,800 milliliters daily or higher. During times of higher output or heavy sweating, you need to drink more fluids. You may need to actually measure how much you are drinking and your output from your ostomy when it is

## 2024-02-29 NOTE — PERIOP NOTE
TRANSFER - OUT REPORT:    Verbal report given to Sarah REGAN on Pily Howell  being transferred to [facility] for routine post-op       Report consisted of patient's Situation, Background, Assessment and   Recommendations(SBAR).     Information from the following report(s) Nurse Handoff Report, Surgery Report, Intake/Output, MAR, Recent Results, and Cardiac Rhythm Sinus tachy  was reviewed with the receiving nurse.           Lines:   Peripheral IV 02/26/24 Left;Proximal;Anterior Antecubital (Active)   Site Assessment Clean, dry & intact 02/28/24 1502   Line Status Flushed 02/28/24 1502   Line Care Connections checked and tightened 02/28/24 1502   Phlebitis Assessment No symptoms 02/28/24 1502   Infiltration Assessment 0 02/28/24 1502   Alcohol Cap Used Yes 02/28/24 1502   Dressing Status Clean, dry & intact 02/28/24 1502   Dressing Type Transparent 02/28/24 1502       Peripheral IV 02/28/24 Left Wrist (Active)   Site Assessment Clean, dry & intact 02/28/24 1511   Line Status Blood return noted;Infusing 02/28/24 1511   Line Care Connections checked and tightened 02/28/24 1511   Phlebitis Assessment No symptoms 02/28/24 1511   Infiltration Assessment 0 02/28/24 1511   Alcohol Cap Used Yes 02/28/24 1511   Dressing Status Clean, dry & intact 02/28/24 1511   Dressing Type Transparent 02/28/24 1511        Opportunity for questions and clarification was provided.      Patient transported with:  O2 @ 3lpm

## 2024-03-01 LAB
ANION GAP SERPL CALC-SCNC: 4 MMOL/L (ref 5–15)
BUN SERPL-MCNC: 12 MG/DL (ref 6–20)
BUN/CREAT SERPL: 19 (ref 12–20)
CALCIUM SERPL-MCNC: 8.4 MG/DL (ref 8.5–10.1)
CHLORIDE SERPL-SCNC: 101 MMOL/L (ref 97–108)
CO2 SERPL-SCNC: 31 MMOL/L (ref 21–32)
CREAT SERPL-MCNC: 0.64 MG/DL (ref 0.55–1.02)
ERYTHROCYTE [DISTWIDTH] IN BLOOD BY AUTOMATED COUNT: 14.6 % (ref 11.5–14.5)
GLUCOSE SERPL-MCNC: 114 MG/DL (ref 65–100)
HCT VFR BLD AUTO: 25.6 % (ref 35–47)
HGB BLD-MCNC: 8 G/DL (ref 11.5–16)
MCH RBC QN AUTO: 26.7 PG (ref 26–34)
MCHC RBC AUTO-ENTMCNC: 31.3 G/DL (ref 30–36.5)
MCV RBC AUTO: 85.3 FL (ref 80–99)
NRBC # BLD: 0 K/UL (ref 0–0.01)
NRBC BLD-RTO: 0 PER 100 WBC
PLATELET # BLD AUTO: 413 K/UL (ref 150–400)
PMV BLD AUTO: 9.5 FL (ref 8.9–12.9)
POTASSIUM SERPL-SCNC: 3.4 MMOL/L (ref 3.5–5.1)
RBC # BLD AUTO: 3 M/UL (ref 3.8–5.2)
SODIUM SERPL-SCNC: 136 MMOL/L (ref 136–145)
WBC # BLD AUTO: 15 K/UL (ref 3.6–11)

## 2024-03-01 PROCEDURE — 6370000000 HC RX 637 (ALT 250 FOR IP): Performed by: SURGERY

## 2024-03-01 PROCEDURE — 94761 N-INVAS EAR/PLS OXIMETRY MLT: CPT

## 2024-03-01 PROCEDURE — 80048 BASIC METABOLIC PNL TOTAL CA: CPT

## 2024-03-01 PROCEDURE — 36415 COLL VENOUS BLD VENIPUNCTURE: CPT

## 2024-03-01 PROCEDURE — 2580000003 HC RX 258: Performed by: SURGERY

## 2024-03-01 PROCEDURE — 1100000000 HC RM PRIVATE

## 2024-03-01 PROCEDURE — 6360000002 HC RX W HCPCS: Performed by: SURGERY

## 2024-03-01 PROCEDURE — 85027 COMPLETE CBC AUTOMATED: CPT

## 2024-03-01 PROCEDURE — 97116 GAIT TRAINING THERAPY: CPT

## 2024-03-01 RX ORDER — POTASSIUM CHLORIDE 750 MG/1
40 TABLET, FILM COATED, EXTENDED RELEASE ORAL ONCE
Status: DISCONTINUED | OUTPATIENT
Start: 2024-03-01 | End: 2024-03-08 | Stop reason: HOSPADM

## 2024-03-01 RX ADMIN — DOCUSATE SODIUM 100 MG: 100 CAPSULE, LIQUID FILLED ORAL at 20:00

## 2024-03-01 RX ADMIN — METRONIDAZOLE 500 MG: 500 INJECTION, SOLUTION INTRAVENOUS at 11:12

## 2024-03-01 RX ADMIN — KETOROLAC TROMETHAMINE 30 MG: 30 INJECTION INTRAMUSCULAR; INTRAVENOUS at 17:02

## 2024-03-01 RX ADMIN — POTASSIUM BICARBONATE 40 MEQ: 782 TABLET, EFFERVESCENT ORAL at 12:00

## 2024-03-01 RX ADMIN — DOCUSATE SODIUM 100 MG: 100 CAPSULE, LIQUID FILLED ORAL at 08:19

## 2024-03-01 RX ADMIN — KETOROLAC TROMETHAMINE 30 MG: 30 INJECTION INTRAMUSCULAR; INTRAVENOUS at 03:39

## 2024-03-01 RX ADMIN — SODIUM CHLORIDE, PRESERVATIVE FREE 10 ML: 5 INJECTION INTRAVENOUS at 20:00

## 2024-03-01 RX ADMIN — ENOXAPARIN SODIUM 30 MG: 100 INJECTION SUBCUTANEOUS at 08:19

## 2024-03-01 RX ADMIN — Medication: at 18:10

## 2024-03-01 RX ADMIN — LEVOFLOXACIN 750 MG: 5 INJECTION, SOLUTION INTRAVENOUS at 02:01

## 2024-03-01 RX ADMIN — METRONIDAZOLE 500 MG: 500 INJECTION, SOLUTION INTRAVENOUS at 03:40

## 2024-03-01 RX ADMIN — ACETAMINOPHEN 1000 MG: 500 TABLET ORAL at 05:59

## 2024-03-01 RX ADMIN — ENOXAPARIN SODIUM 30 MG: 100 INJECTION SUBCUTANEOUS at 19:59

## 2024-03-01 RX ADMIN — NALOXEGOL OXALATE 12.5 MG: 25 TABLET, FILM COATED ORAL at 06:00

## 2024-03-01 RX ADMIN — KETOROLAC TROMETHAMINE 30 MG: 30 INJECTION INTRAMUSCULAR; INTRAVENOUS at 11:12

## 2024-03-01 RX ADMIN — METRONIDAZOLE 500 MG: 500 INJECTION, SOLUTION INTRAVENOUS at 19:58

## 2024-03-01 NOTE — WOUND CARE
Ostomy Progress Note:  2ndpostoperative visit.  Type of Ostomy;ileostomy  Location:[facility]  Date of Surgery:2/28  Surgeon:     Treatments:  Pouch removed, stoma and peristomal skin cleaned and assessed, new pouch prepared and applied.    Findings:  Current appliance:single  Stoma size:45mm with slight crease  Stoma color:red  Characteristics:moist,with red rubber catheter  Peristomal skin:clean  Abdomen:distended  Output:liquid green, 150cc  Jpx2- JOHN on right- draining liquid brown stool like drainage.Chanel Rodriguez aware.  Abdominal dressing dry and intact  Recommendations:  Empty appliance when 1/3 full and PRN. Encourage patient/family to notify nurse and assist w/ pouch emptying to promote self care. Change appliance twice weekly and PRN for leaking ASAP. DO NOT REINFORCE LEAKS to avoid skin breakdown.  Will follow   Tati Elmore RN

## 2024-03-02 LAB
ERYTHROCYTE [DISTWIDTH] IN BLOOD BY AUTOMATED COUNT: 14.9 % (ref 11.5–14.5)
HCT VFR BLD AUTO: 22.3 % (ref 35–47)
HGB BLD-MCNC: 7.1 G/DL (ref 11.5–16)
MCH RBC QN AUTO: 26.7 PG (ref 26–34)
MCHC RBC AUTO-ENTMCNC: 31.8 G/DL (ref 30–36.5)
MCV RBC AUTO: 83.8 FL (ref 80–99)
NRBC # BLD: 0 K/UL (ref 0–0.01)
NRBC BLD-RTO: 0 PER 100 WBC
PLATELET # BLD AUTO: 420 K/UL (ref 150–400)
PMV BLD AUTO: 9.3 FL (ref 8.9–12.9)
RBC # BLD AUTO: 2.66 M/UL (ref 3.8–5.2)
WBC # BLD AUTO: 17 K/UL (ref 3.6–11)

## 2024-03-02 PROCEDURE — 6370000000 HC RX 637 (ALT 250 FOR IP): Performed by: SURGERY

## 2024-03-02 PROCEDURE — 2580000003 HC RX 258: Performed by: SURGERY

## 2024-03-02 PROCEDURE — 1100000000 HC RM PRIVATE

## 2024-03-02 PROCEDURE — 36415 COLL VENOUS BLD VENIPUNCTURE: CPT

## 2024-03-02 PROCEDURE — 85027 COMPLETE CBC AUTOMATED: CPT

## 2024-03-02 PROCEDURE — 6360000002 HC RX W HCPCS: Performed by: SURGERY

## 2024-03-02 RX ADMIN — DOCUSATE SODIUM 100 MG: 100 CAPSULE, LIQUID FILLED ORAL at 08:29

## 2024-03-02 RX ADMIN — KETOROLAC TROMETHAMINE 30 MG: 30 INJECTION INTRAMUSCULAR; INTRAVENOUS at 16:30

## 2024-03-02 RX ADMIN — CEFEPIME 2000 MG: 2 INJECTION, POWDER, FOR SOLUTION INTRAVENOUS at 14:06

## 2024-03-02 RX ADMIN — NALOXEGOL OXALATE 12.5 MG: 25 TABLET, FILM COATED ORAL at 06:47

## 2024-03-02 RX ADMIN — ACETAMINOPHEN 650 MG: 160 SOLUTION ORAL at 01:18

## 2024-03-02 RX ADMIN — KETOROLAC TROMETHAMINE 30 MG: 30 INJECTION INTRAMUSCULAR; INTRAVENOUS at 10:54

## 2024-03-02 RX ADMIN — SODIUM CHLORIDE, PRESERVATIVE FREE 10 ML: 5 INJECTION INTRAVENOUS at 08:30

## 2024-03-02 RX ADMIN — ACETAMINOPHEN 650 MG: 160 SOLUTION ORAL at 17:08

## 2024-03-02 RX ADMIN — ENOXAPARIN SODIUM 30 MG: 100 INJECTION SUBCUTANEOUS at 21:32

## 2024-03-02 RX ADMIN — METRONIDAZOLE 500 MG: 500 INJECTION, SOLUTION INTRAVENOUS at 04:12

## 2024-03-02 RX ADMIN — Medication: at 23:59

## 2024-03-02 RX ADMIN — METRONIDAZOLE 500 MG: 500 INJECTION, SOLUTION INTRAVENOUS at 10:54

## 2024-03-02 RX ADMIN — KETOROLAC TROMETHAMINE 30 MG: 30 INJECTION INTRAMUSCULAR; INTRAVENOUS at 04:12

## 2024-03-02 RX ADMIN — KETOROLAC TROMETHAMINE 30 MG: 30 INJECTION INTRAMUSCULAR; INTRAVENOUS at 21:32

## 2024-03-02 RX ADMIN — METRONIDAZOLE 500 MG: 500 INJECTION, SOLUTION INTRAVENOUS at 18:41

## 2024-03-02 RX ADMIN — LEVOFLOXACIN 750 MG: 5 INJECTION, SOLUTION INTRAVENOUS at 00:29

## 2024-03-02 RX ADMIN — DOCUSATE SODIUM 100 MG: 100 CAPSULE, LIQUID FILLED ORAL at 21:32

## 2024-03-02 RX ADMIN — ENOXAPARIN SODIUM 30 MG: 100 INJECTION SUBCUTANEOUS at 08:29

## 2024-03-02 ASSESSMENT — PAIN SCALES - GENERAL
PAINLEVEL_OUTOF10: 4

## 2024-03-02 ASSESSMENT — PAIN DESCRIPTION - LOCATION: LOCATION: ABDOMEN

## 2024-03-03 LAB
BASOPHILS # BLD: 0 K/UL (ref 0–0.1)
BASOPHILS NFR BLD: 0 % (ref 0–1)
DIFFERENTIAL METHOD BLD: ABNORMAL
EOSINOPHIL # BLD: 0.1 K/UL (ref 0–0.4)
EOSINOPHIL NFR BLD: 1 % (ref 0–7)
ERYTHROCYTE [DISTWIDTH] IN BLOOD BY AUTOMATED COUNT: 15 % (ref 11.5–14.5)
HCT VFR BLD AUTO: 21.7 % (ref 35–47)
HGB BLD-MCNC: 6.6 G/DL (ref 11.5–16)
HISTORY CHECK: NORMAL
IMM GRANULOCYTES # BLD AUTO: 0 K/UL
IMM GRANULOCYTES NFR BLD AUTO: 0 %
LYMPHOCYTES # BLD: 1.7 K/UL (ref 0.8–3.5)
LYMPHOCYTES NFR BLD: 19 % (ref 12–49)
MCH RBC QN AUTO: 26.2 PG (ref 26–34)
MCHC RBC AUTO-ENTMCNC: 30.4 G/DL (ref 30–36.5)
MCV RBC AUTO: 86.1 FL (ref 80–99)
MONOCYTES # BLD: 0.7 K/UL (ref 0–1)
MONOCYTES NFR BLD: 8 % (ref 5–13)
NEUTS BAND NFR BLD MANUAL: 3 % (ref 0–6)
NEUTS SEG # BLD: 6.7 K/UL (ref 1.8–8)
NEUTS SEG NFR BLD: 69 % (ref 32–75)
NRBC # BLD: 0 K/UL (ref 0–0.01)
NRBC BLD-RTO: 0 PER 100 WBC
PLATELET # BLD AUTO: 393 K/UL (ref 150–400)
PMV BLD AUTO: 9.4 FL (ref 8.9–12.9)
RBC # BLD AUTO: 2.52 M/UL (ref 3.8–5.2)
RBC MORPH BLD: ABNORMAL
WBC # BLD AUTO: 9.2 K/UL (ref 3.6–11)

## 2024-03-03 PROCEDURE — 86900 BLOOD TYPING SEROLOGIC ABO: CPT

## 2024-03-03 PROCEDURE — 86901 BLOOD TYPING SEROLOGIC RH(D): CPT

## 2024-03-03 PROCEDURE — 6360000002 HC RX W HCPCS: Performed by: SURGERY

## 2024-03-03 PROCEDURE — 6370000000 HC RX 637 (ALT 250 FOR IP): Performed by: SURGERY

## 2024-03-03 PROCEDURE — P9016 RBC LEUKOCYTES REDUCED: HCPCS

## 2024-03-03 PROCEDURE — 30233N1 TRANSFUSION OF NONAUTOLOGOUS RED BLOOD CELLS INTO PERIPHERAL VEIN, PERCUTANEOUS APPROACH: ICD-10-PCS | Performed by: SURGERY

## 2024-03-03 PROCEDURE — 86850 RBC ANTIBODY SCREEN: CPT

## 2024-03-03 PROCEDURE — 2580000003 HC RX 258: Performed by: SURGERY

## 2024-03-03 PROCEDURE — 36415 COLL VENOUS BLD VENIPUNCTURE: CPT

## 2024-03-03 PROCEDURE — 86923 COMPATIBILITY TEST ELECTRIC: CPT

## 2024-03-03 PROCEDURE — 94761 N-INVAS EAR/PLS OXIMETRY MLT: CPT

## 2024-03-03 PROCEDURE — 36430 TRANSFUSION BLD/BLD COMPNT: CPT

## 2024-03-03 PROCEDURE — 85025 COMPLETE CBC W/AUTO DIFF WBC: CPT

## 2024-03-03 PROCEDURE — 1100000000 HC RM PRIVATE

## 2024-03-03 RX ORDER — SODIUM CHLORIDE 9 MG/ML
INJECTION, SOLUTION INTRAVENOUS PRN
Status: DISCONTINUED | OUTPATIENT
Start: 2024-03-03 | End: 2024-03-08 | Stop reason: HOSPADM

## 2024-03-03 RX ADMIN — ACETAMINOPHEN 650 MG: 160 SOLUTION ORAL at 05:10

## 2024-03-03 RX ADMIN — KETOROLAC TROMETHAMINE 30 MG: 30 INJECTION INTRAMUSCULAR; INTRAVENOUS at 11:12

## 2024-03-03 RX ADMIN — NALOXEGOL OXALATE 12.5 MG: 25 TABLET, FILM COATED ORAL at 05:09

## 2024-03-03 RX ADMIN — KETOROLAC TROMETHAMINE 30 MG: 30 INJECTION INTRAMUSCULAR; INTRAVENOUS at 05:09

## 2024-03-03 RX ADMIN — ACETAMINOPHEN 650 MG: 160 SOLUTION ORAL at 23:22

## 2024-03-03 RX ADMIN — SODIUM CHLORIDE, PRESERVATIVE FREE 10 ML: 5 INJECTION INTRAVENOUS at 20:52

## 2024-03-03 RX ADMIN — DOCUSATE SODIUM 100 MG: 100 CAPSULE, LIQUID FILLED ORAL at 20:51

## 2024-03-03 RX ADMIN — ACETAMINOPHEN 650 MG: 160 SOLUTION ORAL at 01:00

## 2024-03-03 RX ADMIN — METRONIDAZOLE 500 MG: 500 INJECTION, SOLUTION INTRAVENOUS at 20:51

## 2024-03-03 RX ADMIN — ACETAMINOPHEN 650 MG: 160 SOLUTION ORAL at 15:32

## 2024-03-03 RX ADMIN — CEFEPIME 2000 MG: 2 INJECTION, POWDER, FOR SOLUTION INTRAVENOUS at 01:00

## 2024-03-03 RX ADMIN — CEFEPIME 2000 MG: 2 INJECTION, POWDER, FOR SOLUTION INTRAVENOUS at 14:54

## 2024-03-03 RX ADMIN — METRONIDAZOLE 500 MG: 500 INJECTION, SOLUTION INTRAVENOUS at 11:13

## 2024-03-03 RX ADMIN — KETOROLAC TROMETHAMINE 30 MG: 30 INJECTION INTRAMUSCULAR; INTRAVENOUS at 16:48

## 2024-03-03 RX ADMIN — ACETAMINOPHEN 650 MG: 160 SOLUTION ORAL at 11:12

## 2024-03-03 RX ADMIN — METRONIDAZOLE 500 MG: 500 INJECTION, SOLUTION INTRAVENOUS at 05:08

## 2024-03-03 RX ADMIN — ENOXAPARIN SODIUM 30 MG: 100 INJECTION SUBCUTANEOUS at 08:39

## 2024-03-03 RX ADMIN — ENOXAPARIN SODIUM 30 MG: 100 INJECTION SUBCUTANEOUS at 20:52

## 2024-03-03 RX ADMIN — SODIUM CHLORIDE, PRESERVATIVE FREE 10 ML: 5 INJECTION INTRAVENOUS at 08:40

## 2024-03-03 RX ADMIN — DOCUSATE SODIUM 100 MG: 100 CAPSULE, LIQUID FILLED ORAL at 08:39

## 2024-03-03 RX ADMIN — KETOROLAC TROMETHAMINE 30 MG: 30 INJECTION INTRAMUSCULAR; INTRAVENOUS at 23:23

## 2024-03-03 ASSESSMENT — PAIN SCALES - GENERAL
PAINLEVEL_OUTOF10: 1
PAINLEVEL_OUTOF10: 5
PAINLEVEL_OUTOF10: 5
PAINLEVEL_OUTOF10: 3
PAINLEVEL_OUTOF10: 5
PAINLEVEL_OUTOF10: 5
PAINLEVEL_OUTOF10: 6
PAINLEVEL_OUTOF10: 4

## 2024-03-03 ASSESSMENT — PAIN DESCRIPTION - LOCATION
LOCATION: ABDOMEN

## 2024-03-04 LAB
ABO + RH BLD: NORMAL
BASOPHILS # BLD: 0 K/UL (ref 0–0.1)
BLD PROD TYP BPU: NORMAL
BLOOD BANK DISPENSE STATUS: NORMAL
BLOOD GROUP ANTIBODIES SERPL: NORMAL
BPU ID: NORMAL
BUN SERPL-MCNC: 11 MG/DL (ref 6–20)
CALCIUM SERPL-MCNC: 7.8 MG/DL (ref 8.5–10.1)
CO2 SERPL-SCNC: 27 MMOL/L (ref 21–32)
CREAT SERPL-MCNC: 0.38 MG/DL (ref 0.55–1.02)
CROSSMATCH RESULT: NORMAL
DIFFERENTIAL METHOD BLD: ABNORMAL
EOSINOPHIL # BLD: 0.1 K/UL (ref 0–0.4)
EOSINOPHIL NFR BLD: 1 % (ref 0–7)
ERYTHROCYTE [DISTWIDTH] IN BLOOD BY AUTOMATED COUNT: 15 % (ref 11.5–14.5)
GLUCOSE SERPL-MCNC: 107 MG/DL (ref 65–100)
HCT VFR BLD AUTO: 21.7 % (ref 35–47)
HGB BLD-MCNC: 6.5 G/DL (ref 11.5–16)
HGB BLD-MCNC: 6.7 G/DL (ref 11.5–16)
IMM GRANULOCYTES # BLD AUTO: 0 K/UL (ref 0–0.04)
IMM GRANULOCYTES NFR BLD AUTO: 0 % (ref 0–0.5)
LYMPHOCYTES # BLD: 1 K/UL (ref 0.8–3.5)
LYMPHOCYTES NFR BLD: 15 % (ref 12–49)
MCH RBC QN AUTO: 26 PG (ref 26–34)
MCHC RBC AUTO-ENTMCNC: 30.7 G/DL (ref 30–36.5)
MCV RBC AUTO: 84.8 FL (ref 80–99)
MONOCYTES # BLD: 0.3 K/UL (ref 0–1)
MONOCYTES NFR BLD: 5 % (ref 5–13)
NEUTS SEG # BLD: 5.4 K/UL (ref 1.8–8)
NEUTS SEG NFR BLD: 79 % (ref 32–75)
NRBC # BLD: 0 K/UL (ref 0–0.01)
NRBC BLD-RTO: 0 PER 100 WBC
PLATELET # BLD AUTO: 440 K/UL (ref 150–400)
PMV BLD AUTO: 8.9 FL (ref 8.9–12.9)
POTASSIUM SERPL-SCNC: 3.2 MMOL/L (ref 3.5–5.1)
RBC # BLD AUTO: 2.5 M/UL (ref 3.8–5.2)
RBC MORPH BLD: ABNORMAL
SODIUM SERPL-SCNC: 141 MMOL/L (ref 136–145)
SPECIMEN EXP DATE BLD: NORMAL
UNIT DIVISION: 0
WBC # BLD AUTO: 6.8 K/UL (ref 3.6–11)

## 2024-03-04 PROCEDURE — 6370000000 HC RX 637 (ALT 250 FOR IP): Performed by: SURGERY

## 2024-03-04 PROCEDURE — 85014 HEMATOCRIT: CPT

## 2024-03-04 PROCEDURE — 6360000002 HC RX W HCPCS: Performed by: SURGERY

## 2024-03-04 PROCEDURE — 80048 BASIC METABOLIC PNL TOTAL CA: CPT

## 2024-03-04 PROCEDURE — 2580000003 HC RX 258: Performed by: SURGERY

## 2024-03-04 PROCEDURE — 36415 COLL VENOUS BLD VENIPUNCTURE: CPT

## 2024-03-04 PROCEDURE — 94761 N-INVAS EAR/PLS OXIMETRY MLT: CPT

## 2024-03-04 PROCEDURE — 2580000003 HC RX 258: Performed by: ANESTHESIOLOGY

## 2024-03-04 PROCEDURE — 1100000000 HC RM PRIVATE

## 2024-03-04 PROCEDURE — 85018 HEMOGLOBIN: CPT

## 2024-03-04 PROCEDURE — 85025 COMPLETE CBC W/AUTO DIFF WBC: CPT

## 2024-03-04 RX ORDER — POTASSIUM CHLORIDE 750 MG/1
40 TABLET, FILM COATED, EXTENDED RELEASE ORAL DAILY
Status: DISCONTINUED | OUTPATIENT
Start: 2024-03-04 | End: 2024-03-04

## 2024-03-04 RX ADMIN — SODIUM CHLORIDE: 9 INJECTION, SOLUTION INTRAVENOUS at 15:53

## 2024-03-04 RX ADMIN — METRONIDAZOLE 500 MG: 500 INJECTION, SOLUTION INTRAVENOUS at 02:44

## 2024-03-04 RX ADMIN — SODIUM CHLORIDE, PRESERVATIVE FREE 10 ML: 5 INJECTION INTRAVENOUS at 09:31

## 2024-03-04 RX ADMIN — NYSTATIN 500000 UNITS: 100000 SUSPENSION ORAL at 21:11

## 2024-03-04 RX ADMIN — ENOXAPARIN SODIUM 30 MG: 100 INJECTION SUBCUTANEOUS at 21:11

## 2024-03-04 RX ADMIN — ENOXAPARIN SODIUM 30 MG: 100 INJECTION SUBCUTANEOUS at 09:18

## 2024-03-04 RX ADMIN — METRONIDAZOLE 500 MG: 500 INJECTION, SOLUTION INTRAVENOUS at 15:54

## 2024-03-04 RX ADMIN — KETOROLAC TROMETHAMINE 30 MG: 30 INJECTION INTRAMUSCULAR; INTRAVENOUS at 05:53

## 2024-03-04 RX ADMIN — CEFEPIME 2000 MG: 2 INJECTION, POWDER, FOR SOLUTION INTRAVENOUS at 17:04

## 2024-03-04 RX ADMIN — NALOXEGOL OXALATE 12.5 MG: 25 TABLET, FILM COATED ORAL at 05:52

## 2024-03-04 RX ADMIN — CEFEPIME 2000 MG: 2 INJECTION, POWDER, FOR SOLUTION INTRAVENOUS at 02:45

## 2024-03-04 RX ADMIN — POTASSIUM BICARBONATE 40 MEQ: 782 TABLET, EFFERVESCENT ORAL at 16:33

## 2024-03-04 RX ADMIN — KETOROLAC TROMETHAMINE 30 MG: 30 INJECTION INTRAMUSCULAR; INTRAVENOUS at 16:16

## 2024-03-04 RX ADMIN — ACETAMINOPHEN 650 MG: 160 SOLUTION ORAL at 09:29

## 2024-03-04 RX ADMIN — SODIUM CHLORIDE, PRESERVATIVE FREE 10 ML: 5 INJECTION INTRAVENOUS at 21:12

## 2024-03-04 RX ADMIN — DOCUSATE SODIUM 100 MG: 100 CAPSULE, LIQUID FILLED ORAL at 21:12

## 2024-03-04 RX ADMIN — KETOROLAC TROMETHAMINE 30 MG: 30 INJECTION INTRAMUSCULAR; INTRAVENOUS at 12:03

## 2024-03-04 RX ADMIN — IRON SUCROSE 200 MG: 20 INJECTION, SOLUTION INTRAVENOUS at 18:09

## 2024-03-04 RX ADMIN — NYSTATIN 500000 UNITS: 100000 SUSPENSION ORAL at 16:33

## 2024-03-04 RX ADMIN — DOCUSATE SODIUM 100 MG: 100 CAPSULE, LIQUID FILLED ORAL at 09:18

## 2024-03-04 ASSESSMENT — PAIN SCALES - GENERAL
PAINLEVEL_OUTOF10: 7
PAINLEVEL_OUTOF10: 5
PAINLEVEL_OUTOF10: 3

## 2024-03-04 ASSESSMENT — PAIN DESCRIPTION - LOCATION
LOCATION: ABDOMEN;ARM
LOCATION: ABDOMEN

## 2024-03-05 LAB
ALBUMIN SERPL-MCNC: 1.9 G/DL (ref 3.5–5)
ALBUMIN/GLOB SERPL: 0.4 (ref 1.1–2.2)
ALP SERPL-CCNC: 72 U/L (ref 45–117)
ALT SERPL-CCNC: 10 U/L (ref 12–78)
ANION GAP SERPL CALC-SCNC: 4 MMOL/L (ref 5–15)
AST SERPL-CCNC: 12 U/L (ref 15–37)
BASOPHILS # BLD: 0 K/UL (ref 0–0.1)
BASOPHILS NFR BLD: 1 % (ref 0–1)
BILIRUB SERPL-MCNC: 0.3 MG/DL (ref 0.2–1)
BUN SERPL-MCNC: 9 MG/DL (ref 6–20)
BUN/CREAT SERPL: 24 (ref 12–20)
CALCIUM SERPL-MCNC: 8.3 MG/DL (ref 8.5–10.1)
CHLORIDE SERPL-SCNC: 104 MMOL/L (ref 97–108)
CO2 SERPL-SCNC: 28 MMOL/L (ref 21–32)
CREAT SERPL-MCNC: 0.38 MG/DL (ref 0.55–1.02)
DIFFERENTIAL METHOD BLD: ABNORMAL
EOSINOPHIL # BLD: 0.2 K/UL (ref 0–0.4)
EOSINOPHIL NFR BLD: 3 % (ref 0–7)
ERYTHROCYTE [DISTWIDTH] IN BLOOD BY AUTOMATED COUNT: 15.2 % (ref 11.5–14.5)
FERRITIN SERPL-MCNC: 512 NG/ML (ref 8–252)
GLOBULIN SER CALC-MCNC: 4.3 G/DL (ref 2–4)
GLUCOSE SERPL-MCNC: 104 MG/DL (ref 65–100)
HAPTOGLOB SERPL-MCNC: 351 MG/DL (ref 30–200)
HCT VFR BLD AUTO: 24.2 % (ref 35–47)
HGB BLD-MCNC: 7.4 G/DL (ref 11.5–16)
IMM GRANULOCYTES # BLD AUTO: 0.1 K/UL (ref 0–0.04)
IMM GRANULOCYTES NFR BLD AUTO: 2 % (ref 0–0.5)
IRON SATN MFR SERPL: 18 % (ref 20–50)
IRON SERPL-MCNC: 24 UG/DL (ref 35–150)
LDH SERPL L TO P-CCNC: 172 U/L (ref 81–246)
LYMPHOCYTES # BLD: 1.7 K/UL (ref 0.8–3.5)
LYMPHOCYTES NFR BLD: 22 % (ref 12–49)
MCH RBC QN AUTO: 26.1 PG (ref 26–34)
MCHC RBC AUTO-ENTMCNC: 30.6 G/DL (ref 30–36.5)
MCV RBC AUTO: 85.2 FL (ref 80–99)
MONOCYTES # BLD: 0.8 K/UL (ref 0–1)
MONOCYTES NFR BLD: 10 % (ref 5–13)
NEUTS SEG # BLD: 4.7 K/UL (ref 1.8–8)
NEUTS SEG NFR BLD: 62 % (ref 32–75)
NRBC # BLD: 0 K/UL (ref 0–0.01)
NRBC BLD-RTO: 0 PER 100 WBC
PLATELET # BLD AUTO: 500 K/UL (ref 150–400)
PMV BLD AUTO: 8.6 FL (ref 8.9–12.9)
POTASSIUM SERPL-SCNC: 3.6 MMOL/L (ref 3.5–5.1)
PROT SERPL-MCNC: 6.2 G/DL (ref 6.4–8.2)
RBC # BLD AUTO: 2.84 M/UL (ref 3.8–5.2)
SODIUM SERPL-SCNC: 136 MMOL/L (ref 136–145)
TIBC SERPL-MCNC: 130 UG/DL (ref 250–450)
TSH SERPL DL<=0.05 MIU/L-ACNC: 3.96 UIU/ML (ref 0.36–3.74)
VIT B12 SERPL-MCNC: 551 PG/ML (ref 193–986)
WBC # BLD AUTO: 7.4 K/UL (ref 3.6–11)

## 2024-03-05 PROCEDURE — 83921 ORGANIC ACID SINGLE QUANT: CPT

## 2024-03-05 PROCEDURE — 6360000002 HC RX W HCPCS: Performed by: NURSE PRACTITIONER

## 2024-03-05 PROCEDURE — 6370000000 HC RX 637 (ALT 250 FOR IP): Performed by: SURGERY

## 2024-03-05 PROCEDURE — 1100000000 HC RM PRIVATE

## 2024-03-05 PROCEDURE — 2580000003 HC RX 258: Performed by: SURGERY

## 2024-03-05 PROCEDURE — 6360000002 HC RX W HCPCS: Performed by: SURGERY

## 2024-03-05 PROCEDURE — 94761 N-INVAS EAR/PLS OXIMETRY MLT: CPT

## 2024-03-05 PROCEDURE — 82746 ASSAY OF FOLIC ACID SERUM: CPT

## 2024-03-05 PROCEDURE — 83540 ASSAY OF IRON: CPT

## 2024-03-05 PROCEDURE — 84443 ASSAY THYROID STIM HORMONE: CPT

## 2024-03-05 PROCEDURE — 36415 COLL VENOUS BLD VENIPUNCTURE: CPT

## 2024-03-05 PROCEDURE — 82607 VITAMIN B-12: CPT

## 2024-03-05 PROCEDURE — 97116 GAIT TRAINING THERAPY: CPT

## 2024-03-05 PROCEDURE — 83615 LACTATE (LD) (LDH) ENZYME: CPT

## 2024-03-05 PROCEDURE — 99222 1ST HOSP IP/OBS MODERATE 55: CPT | Performed by: INTERNAL MEDICINE

## 2024-03-05 PROCEDURE — 83010 ASSAY OF HAPTOGLOBIN QUANT: CPT

## 2024-03-05 PROCEDURE — 82728 ASSAY OF FERRITIN: CPT

## 2024-03-05 PROCEDURE — 80053 COMPREHEN METABOLIC PANEL: CPT

## 2024-03-05 PROCEDURE — 85025 COMPLETE CBC W/AUTO DIFF WBC: CPT

## 2024-03-05 PROCEDURE — 83550 IRON BINDING TEST: CPT

## 2024-03-05 PROCEDURE — 2500000003 HC RX 250 WO HCPCS: Performed by: SURGERY

## 2024-03-05 RX ORDER — CALCIUM CARBONATE 500 MG/1
500 TABLET, CHEWABLE ORAL 3 TIMES DAILY PRN
Status: DISCONTINUED | OUTPATIENT
Start: 2024-03-05 | End: 2024-03-08 | Stop reason: HOSPADM

## 2024-03-05 RX ORDER — FAMOTIDINE 20 MG/1
20 TABLET, FILM COATED ORAL 2 TIMES DAILY
Status: DISCONTINUED | OUTPATIENT
Start: 2024-03-06 | End: 2024-03-08 | Stop reason: HOSPADM

## 2024-03-05 RX ORDER — ACETAMINOPHEN 160 MG/5ML
1000 LIQUID ORAL EVERY 6 HOURS SCHEDULED
Status: DISCONTINUED | OUTPATIENT
Start: 2024-03-05 | End: 2024-03-08 | Stop reason: HOSPADM

## 2024-03-05 RX ORDER — METRONIDAZOLE 250 MG/1
500 TABLET ORAL EVERY 8 HOURS SCHEDULED
Status: DISCONTINUED | OUTPATIENT
Start: 2024-03-05 | End: 2024-03-08 | Stop reason: HOSPADM

## 2024-03-05 RX ADMIN — DOCUSATE SODIUM 100 MG: 100 CAPSULE, LIQUID FILLED ORAL at 08:21

## 2024-03-05 RX ADMIN — ONDANSETRON 4 MG: 2 INJECTION INTRAMUSCULAR; INTRAVENOUS at 22:35

## 2024-03-05 RX ADMIN — ENOXAPARIN SODIUM 30 MG: 100 INJECTION SUBCUTANEOUS at 08:23

## 2024-03-05 RX ADMIN — NYSTATIN 500000 UNITS: 100000 SUSPENSION ORAL at 08:22

## 2024-03-05 RX ADMIN — METRONIDAZOLE 500 MG: 250 TABLET ORAL at 22:27

## 2024-03-05 RX ADMIN — NYSTATIN 500000 UNITS: 100000 SUSPENSION ORAL at 16:11

## 2024-03-05 RX ADMIN — Medication: at 07:15

## 2024-03-05 RX ADMIN — ACETAMINOPHEN 1000 MG: 160 SOLUTION ORAL at 10:35

## 2024-03-05 RX ADMIN — HYDROMORPHONE HYDROCHLORIDE 1 MG: 1 INJECTION, SOLUTION INTRAMUSCULAR; INTRAVENOUS; SUBCUTANEOUS at 14:16

## 2024-03-05 RX ADMIN — SODIUM CHLORIDE, PRESERVATIVE FREE 10 ML: 5 INJECTION INTRAVENOUS at 08:32

## 2024-03-05 RX ADMIN — IRON SUCROSE 200 MG: 20 INJECTION, SOLUTION INTRAVENOUS at 16:11

## 2024-03-05 RX ADMIN — CEFEPIME 2000 MG: 2 INJECTION, POWDER, FOR SOLUTION INTRAVENOUS at 01:59

## 2024-03-05 RX ADMIN — CEFEPIME 2000 MG: 2 INJECTION, POWDER, FOR SOLUTION INTRAVENOUS at 14:17

## 2024-03-05 RX ADMIN — DOCUSATE SODIUM 100 MG: 100 CAPSULE, LIQUID FILLED ORAL at 20:44

## 2024-03-05 RX ADMIN — METRONIDAZOLE 500 MG: 250 TABLET ORAL at 16:15

## 2024-03-05 RX ADMIN — KETOROLAC TROMETHAMINE 30 MG: 30 INJECTION INTRAMUSCULAR; INTRAVENOUS at 12:19

## 2024-03-05 RX ADMIN — KETOROLAC TROMETHAMINE 30 MG: 30 INJECTION INTRAMUSCULAR; INTRAVENOUS at 06:33

## 2024-03-05 RX ADMIN — OXYCODONE 5 MG: 5 TABLET ORAL at 20:44

## 2024-03-05 RX ADMIN — NYSTATIN 500000 UNITS: 100000 SUSPENSION ORAL at 12:19

## 2024-03-05 RX ADMIN — NYSTATIN 500000 UNITS: 100000 SUSPENSION ORAL at 20:45

## 2024-03-05 RX ADMIN — POTASSIUM BICARBONATE 40 MEQ: 782 TABLET, EFFERVESCENT ORAL at 08:22

## 2024-03-05 RX ADMIN — SODIUM CHLORIDE, PRESERVATIVE FREE 10 ML: 5 INJECTION INTRAVENOUS at 20:45

## 2024-03-05 RX ADMIN — ENOXAPARIN SODIUM 30 MG: 100 INJECTION SUBCUTANEOUS at 20:45

## 2024-03-05 RX ADMIN — ACETAMINOPHEN 650 MG: 160 SOLUTION ORAL at 00:04

## 2024-03-05 RX ADMIN — METRONIDAZOLE 500 MG: 500 INJECTION, SOLUTION INTRAVENOUS at 08:28

## 2024-03-05 RX ADMIN — ACETAMINOPHEN 1000 MG: 160 SOLUTION ORAL at 22:26

## 2024-03-05 RX ADMIN — METRONIDAZOLE 500 MG: 500 INJECTION, SOLUTION INTRAVENOUS at 00:13

## 2024-03-05 RX ADMIN — ACETAMINOPHEN 1000 MG: 160 SOLUTION ORAL at 16:11

## 2024-03-05 RX ADMIN — KETOROLAC TROMETHAMINE 30 MG: 30 INJECTION INTRAMUSCULAR; INTRAVENOUS at 00:04

## 2024-03-05 ASSESSMENT — PAIN SCALES - GENERAL
PAINLEVEL_OUTOF10: 7
PAINLEVEL_OUTOF10: 4
PAINLEVEL_OUTOF10: 4
PAINLEVEL_OUTOF10: 3

## 2024-03-05 ASSESSMENT — PAIN DESCRIPTION - LOCATION
LOCATION: ABDOMEN

## 2024-03-05 ASSESSMENT — PAIN DESCRIPTION - DESCRIPTORS
DESCRIPTORS: ACHING

## 2024-03-05 ASSESSMENT — PAIN DESCRIPTION - ORIENTATION
ORIENTATION: MID

## 2024-03-05 NOTE — CONSULTS
PLT were normal just prior to her initial presentation in December. Monitor.      Bowel perforation  Secondary to diverticulitis.  She has required multiple surgeries in recent months, most recently 2/28/2024.  --Management per general surgery      Pain: controlled   --general surgery managing      I personally saw and evaluated the patient and performed the key components of medical decision making.  The history, physical exam, and documentation were performed by Ann Schoeneweis, NP.  I reviewed and verified the above documentation and modified it as needed.      Signed By: Norm Engle MD   3/5/24 at 6:37 PM EST

## 2024-03-05 NOTE — WOUND CARE
Wound ostomy follow up-  Patient walked to bathroom, complains of pain, pain med given by nurse Malathi REGAN  Ostomy Progress Note:   3rd postoperative visit.  Type of Ostomy;ileostomy  Location:[facility]  Date of Surgery:2/28  Surgeon:      Treatments:  Pouch removed, stoma and peristomal skin cleaned and crusted, new single appliance prepared and applied with paste to lateral side.    Assessment-  Ileostomy- 45mm moist, red, oval shaped with slight crease of lateral side, liquid green brown stool in bag.slightly budded with red rubber catheter in place.peristomal- red rash with maceration , painful  Midline abdominal surgical incision with staples, well approximated, no drainage, JOHN X2 intact.    Will follow  Tati Elmore RN  Wound

## 2024-03-05 NOTE — CARE COORDINATION
3/5/2024  4:24 PM  Care Management Progress Note    RUR:  18%  Risk Level: []Low [x]Moderate []High    Transition of care plan:  Awaiting medical clearance and DC order. PT/OT clearance. Pt with PCA pump. Wound care following for ostomy.   Home with HH - CM consult for HH noted. CM met with pt and [facility] offered. [facility] indicated as preference. CM submitted referral via [employer], and they accepted.   Outpatient follow-up.  Pt's family to transport.

## 2024-03-06 LAB
ERYTHROCYTE [DISTWIDTH] IN BLOOD BY AUTOMATED COUNT: 15.3 % (ref 11.5–14.5)
HCT VFR BLD AUTO: 23.4 % (ref 35–47)
HGB BLD-MCNC: 7.1 G/DL (ref 11.5–16)
MCH RBC QN AUTO: 26.1 PG (ref 26–34)
MCHC RBC AUTO-ENTMCNC: 30.3 G/DL (ref 30–36.5)
MCV RBC AUTO: 86 FL (ref 80–99)
NRBC # BLD: 0 K/UL (ref 0–0.01)
NRBC BLD-RTO: 0 PER 100 WBC
PLATELET # BLD AUTO: 530 K/UL (ref 150–400)
PMV BLD AUTO: 8.8 FL (ref 8.9–12.9)
RBC # BLD AUTO: 2.72 M/UL (ref 3.8–5.2)
WBC # BLD AUTO: 7.1 K/UL (ref 3.6–11)

## 2024-03-06 PROCEDURE — 6370000000 HC RX 637 (ALT 250 FOR IP): Performed by: SURGERY

## 2024-03-06 PROCEDURE — 2500000003 HC RX 250 WO HCPCS: Performed by: SURGERY

## 2024-03-06 PROCEDURE — 2580000003 HC RX 258: Performed by: ANESTHESIOLOGY

## 2024-03-06 PROCEDURE — 2580000003 HC RX 258: Performed by: SURGERY

## 2024-03-06 PROCEDURE — 94761 N-INVAS EAR/PLS OXIMETRY MLT: CPT

## 2024-03-06 PROCEDURE — 6360000002 HC RX W HCPCS: Performed by: SURGERY

## 2024-03-06 PROCEDURE — 1100000000 HC RM PRIVATE

## 2024-03-06 PROCEDURE — 6360000002 HC RX W HCPCS: Performed by: NURSE PRACTITIONER

## 2024-03-06 PROCEDURE — 36415 COLL VENOUS BLD VENIPUNCTURE: CPT

## 2024-03-06 PROCEDURE — 85027 COMPLETE CBC AUTOMATED: CPT

## 2024-03-06 PROCEDURE — 99232 SBSQ HOSP IP/OBS MODERATE 35: CPT | Performed by: INTERNAL MEDICINE

## 2024-03-06 RX ADMIN — SODIUM CHLORIDE, PRESERVATIVE FREE 10 ML: 5 INJECTION INTRAVENOUS at 21:59

## 2024-03-06 RX ADMIN — CEFEPIME 2000 MG: 2 INJECTION, POWDER, FOR SOLUTION INTRAVENOUS at 03:29

## 2024-03-06 RX ADMIN — ACETAMINOPHEN 1000 MG: 160 SOLUTION ORAL at 21:57

## 2024-03-06 RX ADMIN — SODIUM CHLORIDE, POTASSIUM CHLORIDE, SODIUM LACTATE AND CALCIUM CHLORIDE: 600; 310; 30; 20 INJECTION, SOLUTION INTRAVENOUS at 03:25

## 2024-03-06 RX ADMIN — DOCUSATE SODIUM 100 MG: 100 CAPSULE, LIQUID FILLED ORAL at 08:48

## 2024-03-06 RX ADMIN — IRON SUCROSE 200 MG: 20 INJECTION, SOLUTION INTRAVENOUS at 16:32

## 2024-03-06 RX ADMIN — METRONIDAZOLE 500 MG: 250 TABLET ORAL at 06:01

## 2024-03-06 RX ADMIN — ACETAMINOPHEN 1000 MG: 160 SOLUTION ORAL at 15:44

## 2024-03-06 RX ADMIN — MICONAZOLE NITRATE: 2 POWDER TOPICAL at 08:48

## 2024-03-06 RX ADMIN — METRONIDAZOLE 500 MG: 250 TABLET ORAL at 21:58

## 2024-03-06 RX ADMIN — FAMOTIDINE 20 MG: 20 TABLET, FILM COATED ORAL at 08:48

## 2024-03-06 RX ADMIN — POTASSIUM BICARBONATE 40 MEQ: 782 TABLET, EFFERVESCENT ORAL at 08:48

## 2024-03-06 RX ADMIN — CEFEPIME 2000 MG: 2 INJECTION, POWDER, FOR SOLUTION INTRAVENOUS at 13:01

## 2024-03-06 RX ADMIN — MICONAZOLE NITRATE: 2 POWDER TOPICAL at 06:03

## 2024-03-06 RX ADMIN — MICONAZOLE NITRATE: 2 POWDER TOPICAL at 21:58

## 2024-03-06 RX ADMIN — NYSTATIN 500000 UNITS: 100000 SUSPENSION ORAL at 12:53

## 2024-03-06 RX ADMIN — ENOXAPARIN SODIUM 30 MG: 100 INJECTION SUBCUTANEOUS at 21:58

## 2024-03-06 RX ADMIN — ACETAMINOPHEN 1000 MG: 160 SOLUTION ORAL at 06:01

## 2024-03-06 RX ADMIN — SODIUM CHLORIDE, PRESERVATIVE FREE 10 ML: 5 INJECTION INTRAVENOUS at 08:48

## 2024-03-06 RX ADMIN — FAMOTIDINE 20 MG: 20 TABLET, FILM COATED ORAL at 21:58

## 2024-03-06 RX ADMIN — HYDROMORPHONE HYDROCHLORIDE 1 MG: 1 INJECTION, SOLUTION INTRAMUSCULAR; INTRAVENOUS; SUBCUTANEOUS at 03:34

## 2024-03-06 RX ADMIN — METRONIDAZOLE 500 MG: 250 TABLET ORAL at 12:52

## 2024-03-06 RX ADMIN — ENOXAPARIN SODIUM 30 MG: 100 INJECTION SUBCUTANEOUS at 08:48

## 2024-03-06 RX ADMIN — NYSTATIN 500000 UNITS: 100000 SUSPENSION ORAL at 16:32

## 2024-03-06 RX ADMIN — HYDROMORPHONE HYDROCHLORIDE 1 MG: 1 INJECTION, SOLUTION INTRAMUSCULAR; INTRAVENOUS; SUBCUTANEOUS at 08:47

## 2024-03-06 RX ADMIN — DOCUSATE SODIUM 100 MG: 100 CAPSULE, LIQUID FILLED ORAL at 21:58

## 2024-03-06 RX ADMIN — HYDROMORPHONE HYDROCHLORIDE 1 MG: 1 INJECTION, SOLUTION INTRAMUSCULAR; INTRAVENOUS; SUBCUTANEOUS at 17:33

## 2024-03-06 RX ADMIN — NYSTATIN 500000 UNITS: 100000 SUSPENSION ORAL at 08:48

## 2024-03-06 RX ADMIN — HYDROMORPHONE HYDROCHLORIDE 1 MG: 1 INJECTION, SOLUTION INTRAMUSCULAR; INTRAVENOUS; SUBCUTANEOUS at 22:08

## 2024-03-06 RX ADMIN — NYSTATIN 500000 UNITS: 100000 SUSPENSION ORAL at 21:58

## 2024-03-06 RX ADMIN — FAMOTIDINE 20 MG: 20 TABLET, FILM COATED ORAL at 00:23

## 2024-03-06 ASSESSMENT — PAIN DESCRIPTION - ORIENTATION
ORIENTATION: MID
ORIENTATION: ANTERIOR

## 2024-03-06 ASSESSMENT — PAIN DESCRIPTION - LOCATION
LOCATION: ABDOMEN

## 2024-03-06 ASSESSMENT — PAIN SCALES - GENERAL
PAINLEVEL_OUTOF10: 7
PAINLEVEL_OUTOF10: 4
PAINLEVEL_OUTOF10: 3
PAINLEVEL_OUTOF10: 7

## 2024-03-06 ASSESSMENT — PAIN DESCRIPTION - DESCRIPTORS: DESCRIPTORS: ACHING

## 2024-03-07 ENCOUNTER — APPOINTMENT (OUTPATIENT)
Facility: HOSPITAL | Age: 54
DRG: 330 | End: 2024-03-07
Attending: SURGERY
Payer: COMMERCIAL

## 2024-03-07 LAB
FOLATE SERPL-MCNC: 7 NG/ML
HCT VFR BLD AUTO: 26.1 % (ref 35–47)
HEMOCCULT STL QL: NEGATIVE
HGB BLD-MCNC: 7.9 G/DL (ref 11.5–16)
MCH RBC QN AUTO: 26 PG (ref 26–34)
MCHC RBC AUTO-ENTMCNC: 30.3 G/DL (ref 30–36.5)
MCV RBC AUTO: 85.9 FL (ref 80–99)
NRBC # BLD: 0.03 K/UL (ref 0–0.01)
NRBC BLD-RTO: 0.3 PER 100 WBC
PLATELET # BLD AUTO: 617 K/UL (ref 150–400)
PMV BLD AUTO: 8.7 FL (ref 8.9–12.9)
RBC # BLD AUTO: 3.04 M/UL (ref 3.8–5.2)
RETICS # AUTO: 0.09 M/UL (ref 0.02–0.08)
RETICS/RBC NFR AUTO: 3.1 % (ref 0.7–2.1)
WBC # BLD AUTO: 9.5 K/UL (ref 3.6–11)

## 2024-03-07 PROCEDURE — 36415 COLL VENOUS BLD VENIPUNCTURE: CPT

## 2024-03-07 PROCEDURE — 6360000002 HC RX W HCPCS: Performed by: SURGERY

## 2024-03-07 PROCEDURE — 6370000000 HC RX 637 (ALT 250 FOR IP): Performed by: SURGERY

## 2024-03-07 PROCEDURE — 2580000003 HC RX 258: Performed by: SURGERY

## 2024-03-07 PROCEDURE — 99232 SBSQ HOSP IP/OBS MODERATE 35: CPT | Performed by: INTERNAL MEDICINE

## 2024-03-07 PROCEDURE — 1100000000 HC RM PRIVATE

## 2024-03-07 PROCEDURE — C1751 CATH, INF, PER/CENT/MIDLINE: HCPCS

## 2024-03-07 PROCEDURE — 82272 OCCULT BLD FECES 1-3 TESTS: CPT

## 2024-03-07 PROCEDURE — 94761 N-INVAS EAR/PLS OXIMETRY MLT: CPT

## 2024-03-07 PROCEDURE — 85027 COMPLETE CBC AUTOMATED: CPT

## 2024-03-07 PROCEDURE — 85045 AUTOMATED RETICULOCYTE COUNT: CPT

## 2024-03-07 RX ORDER — MULTIVITAMIN WITH IRON
1 TABLET ORAL DAILY
Status: DISCONTINUED | OUTPATIENT
Start: 2024-03-07 | End: 2024-03-08 | Stop reason: HOSPADM

## 2024-03-07 RX ADMIN — SODIUM CHLORIDE, PRESERVATIVE FREE 10 ML: 5 INJECTION INTRAVENOUS at 22:12

## 2024-03-07 RX ADMIN — CEFEPIME 2000 MG: 2 INJECTION, POWDER, FOR SOLUTION INTRAVENOUS at 16:13

## 2024-03-07 RX ADMIN — NYSTATIN 500000 UNITS: 100000 SUSPENSION ORAL at 14:19

## 2024-03-07 RX ADMIN — OXYCODONE 10 MG: 5 TABLET ORAL at 04:09

## 2024-03-07 RX ADMIN — ENOXAPARIN SODIUM 30 MG: 100 INJECTION SUBCUTANEOUS at 22:07

## 2024-03-07 RX ADMIN — NYSTATIN 500000 UNITS: 100000 SUSPENSION ORAL at 10:10

## 2024-03-07 RX ADMIN — OXYCODONE 5 MG: 5 TABLET ORAL at 14:18

## 2024-03-07 RX ADMIN — SODIUM CHLORIDE, PRESERVATIVE FREE 10 ML: 5 INJECTION INTRAVENOUS at 10:12

## 2024-03-07 RX ADMIN — OXYCODONE 10 MG: 5 TABLET ORAL at 00:42

## 2024-03-07 RX ADMIN — ACETAMINOPHEN 1000 MG: 160 SOLUTION ORAL at 04:09

## 2024-03-07 RX ADMIN — METRONIDAZOLE 500 MG: 250 TABLET ORAL at 22:03

## 2024-03-07 RX ADMIN — DOCUSATE SODIUM 100 MG: 100 CAPSULE, LIQUID FILLED ORAL at 22:03

## 2024-03-07 RX ADMIN — ACETAMINOPHEN 1000 MG: 160 SOLUTION ORAL at 16:10

## 2024-03-07 RX ADMIN — ENOXAPARIN SODIUM 30 MG: 100 INJECTION SUBCUTANEOUS at 16:16

## 2024-03-07 RX ADMIN — ANTACID TABLETS 500 MG: 500 TABLET, CHEWABLE ORAL at 00:36

## 2024-03-07 RX ADMIN — MICONAZOLE NITRATE: 2 POWDER TOPICAL at 10:11

## 2024-03-07 RX ADMIN — MICONAZOLE NITRATE: 2 POWDER TOPICAL at 22:12

## 2024-03-07 RX ADMIN — NYSTATIN 500000 UNITS: 100000 SUSPENSION ORAL at 22:07

## 2024-03-07 RX ADMIN — METRONIDAZOLE 500 MG: 250 TABLET ORAL at 06:34

## 2024-03-07 RX ADMIN — FAMOTIDINE 20 MG: 20 TABLET, FILM COATED ORAL at 10:05

## 2024-03-07 RX ADMIN — ACETAMINOPHEN 1000 MG: 160 SOLUTION ORAL at 22:04

## 2024-03-07 RX ADMIN — POTASSIUM BICARBONATE 40 MEQ: 782 TABLET, EFFERVESCENT ORAL at 11:07

## 2024-03-07 RX ADMIN — FAMOTIDINE 20 MG: 20 TABLET, FILM COATED ORAL at 22:03

## 2024-03-07 RX ADMIN — METRONIDAZOLE 500 MG: 250 TABLET ORAL at 14:19

## 2024-03-07 RX ADMIN — NYSTATIN 500000 UNITS: 100000 SUSPENSION ORAL at 17:48

## 2024-03-07 RX ADMIN — DOCUSATE SODIUM 100 MG: 100 CAPSULE, LIQUID FILLED ORAL at 10:04

## 2024-03-07 ASSESSMENT — PAIN SCALES - GENERAL
PAINLEVEL_OUTOF10: 3
PAINLEVEL_OUTOF10: 8
PAINLEVEL_OUTOF10: 2
PAINLEVEL_OUTOF10: 3
PAINLEVEL_OUTOF10: 3
PAINLEVEL_OUTOF10: 7
PAINLEVEL_OUTOF10: 4
PAINLEVEL_OUTOF10: 3

## 2024-03-07 ASSESSMENT — PAIN DESCRIPTION - LOCATION
LOCATION: ABDOMEN

## 2024-03-07 ASSESSMENT — PAIN DESCRIPTION - ORIENTATION
ORIENTATION: ANTERIOR
ORIENTATION: ANTERIOR

## 2024-03-07 ASSESSMENT — PAIN DESCRIPTION - DESCRIPTORS
DESCRIPTORS: ACHING
DESCRIPTORS: ACHING;SORE

## 2024-03-07 NOTE — OP NOTE
[facility]          [address] [place]  [address]                            OPERATIVE REPORT      PATIENT NAME: RONALD PICKARD             : 1970  MED REC NO: [id number]                       ROOM: 405  ACCOUNT NO: [id number]                       ADMIT DATE: 2024  PROVIDER: Francheska Buchanan MD    DATE OF SERVICE:  2024    PREOPERATIVE DIAGNOSES:  Anastomotic leak.    POSTOPERATIVE DIAGNOSES:  Anastomotic leak.    PROCEDURES PERFORMED:       1. Exploratory laparotomy.     2. Creation of diverting ileostomy.    SURGEON:  Francheska Buchanan MD    ASSISTANT:  ***    ANESTHESIA:  ***    ESTIMATED BLOOD LOSS:  ***    SPECIMENS REMOVED:  ***    INTRAOPERATIVE FINDINGS:  ***     COMPLICATIONS:   None.    IMPLANTS:  ***    INDICATIONS:  The patient is a 53-year-old woman who was recently taken to the operating room for ostomy reversal.  She had been progressing well, but had not yet had a postoperative bowel movement.  Given previous history, the decision was made to order a CT scan and with that she was noted to have significant volume of free air.  The decision was made to bring her to the operating room for exploration.    DESCRIPTION OF PROCEDURE:  After obtaining an informed consent, the patient was brought to the operating room where she was laid supine on the operating table.  After induction of general endotracheal anesthesia, the patient was prepped and draped in standard fashion.  Surgical time-out was conducted, and she has been receiving ongoing antibiotics.  She did not require any additional antibiotics.      With time-out completed, the previously created midline incision was reopened.  Upon entry into the abdominal cavity, she was noted to have adhesions into the pelvis.  These were taken down.  There was a small abscess cavity adjacent to the anastomosis.  She had a JOHN drain in place as well, which was disconnected from the 
placed within the freshly cut end of the ostomy and secured in place using a 2-0 Prolene in a pursestring fashion.  This end of the colon was then dropped back within the abdominal cavity.  GelPort was then placed within the fascial opening from the ostomy and the abdomen was insufflated.  With the abdomen insufflated, an 8 mm port was placed through the GelPort and the abdomen was inspected.  There were dense adhesions throughout the entire abdomen, which would have made robotic procedure exceedingly difficult.  The decision was made to abort any attempts of laparoscopic procedure and convert to an open ostomy reversal.  The GelPort was removed and a 10 blade scalpel was used to make an incision through the previous midline incision.  Dissection was carried out using Bovie electrocautery through the subcutaneous tissue down to the level of the fascia.  The fascia was sharply incised and the abdomen was entered.  Using combination of blunt and sharp dissection, the adhesions to the anterior abdominal wall were carefully taken down.  A large Merlin wound protector was then placed along the midline.  The patient was positioned in Trendelenburg, and using careful blunt dissection, loops of small bowel were carefully freed from the pelvis.  The rectal stump was identified and freed.  Once it was adequately freed on the rectal stump, the proximal bowel was then freed along the lateral abdominal wall and allowing for adequate length to reach into the pelvis.  Once this was accomplished, sizers were placed within the rectum.  There was a large stool ball within the rectum that was manually evacuated.  With this evacuated, the sizers were again placed and there was noted to be a serosal tear in the rectal stump.  The rectal stump was then taken down an additional centimeter beyond the area of the tear.  The stapler was again placed and the tissue was noted to be thin but intact.  The proximal bowel was then attached to the

## 2024-03-07 NOTE — ADT AUTH CERT
mg/mL PCA Patient Bolus (PCA Dose): 0.2 mg  Lockout Interval: 8 Minute One Hour Dose Limit: 1 mg  Loading Dose: 0 mg  CONTINUOUS x 1 - 0929-D/C'd  PO Tylenol 650mg q4h prn given x 1 - 0928-D/C'd  IV Dilaudid 1mg q4h prn x 1  IV Zofran 4mg q6h prn x 1  PO Roxicodone 5mg q4h prn x 1     RN NOTES:  11:00 PM Pt complain of reflux. Md on call paged. New order for pepcid and Tums  per Dr  on call. Will continue to monitor     RN (Wound Care):  Wound ostomy follow up-  Patient walked to bathroom, complains of pain, pain med given by nurse  RN  Ostomy Progress Note:   3rd postoperative visit.  Type of Ostomy;ileostomy     Assessment:  Ileostomy- 45mm moist, red, oval shaped with slight crease of lateral side, liquid green brown stool in bag.slightly budded with red rubber catheter in place.peristomal- red rash with maceration , painful  Midline abdominal surgical incision with staples, well approximated, no drainage, JOHN X2 intact.     Treatments:  Pouch removed, stoma and peristomal skin cleaned and crusted, new single appliance prepared and applied with paste to lateral side.     Will follow     CM NOTES:  Transition of care plan:  1. Awaiting medical clearance and DC order. PT/OT clearance. Pt with PCA pump. Wound care following for ostomy.   2. Home with HH - CM consult for HH noted. CM met with pt and [facility] Choice offered. [facility] indicated as preference. CM submitted referral and they accepted.   3. Outpatient follow-up.  4. Pt's family to transport.     PT NOTES:  ASSESSMENT :  Patient continues to benefit from skilled PT services and is progressing towards goals. She reports continued improvement in overall pain and demonstrates increased activity tolerance with each session. Gait training completed without DME today with noted  initially and slow hernan but this improved over distance. HR remained elevated ~120 BPM during activity and ~104 BPM resting but this is also improved from prior

## 2024-03-07 NOTE — CARE COORDINATION
3/7/2024  10:55 AM  Care Management Progress Note    RUR:  18%  Risk Level: []Low [x]Moderate []High    Transition of care plan:  Awaiting medical clearance and DC order. PT/OT clearance. Hematology following. Wound care following for ostomy. Awaiting ostomy functioning.  Home with HH - [facility] accepted pt (SN, PT).   Outpatient follow-up.  Pt's family to transport.

## 2024-03-08 ENCOUNTER — APPOINTMENT (OUTPATIENT)
Facility: HOSPITAL | Age: 54
DRG: 330 | End: 2024-03-08
Attending: SURGERY
Payer: COMMERCIAL

## 2024-03-08 VITALS
WEIGHT: 224.87 LBS | TEMPERATURE: 98.4 F | RESPIRATION RATE: 16 BRPM | SYSTOLIC BLOOD PRESSURE: 125 MMHG | HEIGHT: 66 IN | DIASTOLIC BLOOD PRESSURE: 76 MMHG | OXYGEN SATURATION: 99 % | BODY MASS INDEX: 36.14 KG/M2 | HEART RATE: 90 BPM

## 2024-03-08 LAB
ERYTHROCYTE [DISTWIDTH] IN BLOOD BY AUTOMATED COUNT: 15.9 % (ref 11.5–14.5)
HCT VFR BLD AUTO: 24 % (ref 35–47)
MCH RBC QN AUTO: 26.4 PG (ref 26–34)
MCHC RBC AUTO-ENTMCNC: 30.4 G/DL (ref 30–36.5)
MCV RBC AUTO: 87 FL (ref 80–99)
NRBC # BLD: 0 K/UL (ref 0–0.01)
NRBC BLD-RTO: 0 PER 100 WBC
PLATELET # BLD AUTO: 592 K/UL (ref 150–400)
PMV BLD AUTO: 8.6 FL (ref 8.9–12.9)
RBC # BLD AUTO: 2.76 M/UL (ref 3.8–5.2)
WBC # BLD AUTO: 9.4 K/UL (ref 3.6–11)

## 2024-03-08 PROCEDURE — 36415 COLL VENOUS BLD VENIPUNCTURE: CPT

## 2024-03-08 PROCEDURE — 85027 COMPLETE CBC AUTOMATED: CPT

## 2024-03-08 PROCEDURE — 6370000000 HC RX 637 (ALT 250 FOR IP): Performed by: SURGERY

## 2024-03-08 PROCEDURE — 94761 N-INVAS EAR/PLS OXIMETRY MLT: CPT

## 2024-03-08 PROCEDURE — 2580000003 HC RX 258: Performed by: SURGERY

## 2024-03-08 PROCEDURE — 6360000002 HC RX W HCPCS: Performed by: SURGERY

## 2024-03-08 PROCEDURE — 6360000004 HC RX CONTRAST MEDICATION: Performed by: SURGERY

## 2024-03-08 PROCEDURE — 74177 CT ABD & PELVIS W/CONTRAST: CPT

## 2024-03-08 RX ORDER — NYSTATIN 100000 [USP'U]/G
POWDER TOPICAL
Qty: 15 G | Refills: 2 | Status: SHIPPED | OUTPATIENT
Start: 2024-03-08

## 2024-03-08 RX ORDER — OXYCODONE HYDROCHLORIDE 5 MG/1
5 TABLET ORAL EVERY 4 HOURS PRN
Qty: 20 TABLET | Refills: 0 | Status: SHIPPED | OUTPATIENT
Start: 2024-03-08 | End: 2024-03-11

## 2024-03-08 RX ADMIN — CEFEPIME 2000 MG: 2 INJECTION, POWDER, FOR SOLUTION INTRAVENOUS at 04:33

## 2024-03-08 RX ADMIN — NYSTATIN 500000 UNITS: 100000 SUSPENSION ORAL at 16:05

## 2024-03-08 RX ADMIN — ACETAMINOPHEN 1000 MG: 160 SOLUTION ORAL at 09:20

## 2024-03-08 RX ADMIN — FAMOTIDINE 20 MG: 20 TABLET, FILM COATED ORAL at 09:19

## 2024-03-08 RX ADMIN — IOPAMIDOL 100 ML: 755 INJECTION, SOLUTION INTRAVENOUS at 12:36

## 2024-03-08 RX ADMIN — POTASSIUM BICARBONATE 40 MEQ: 782 TABLET, EFFERVESCENT ORAL at 09:19

## 2024-03-08 RX ADMIN — SODIUM CHLORIDE, PRESERVATIVE FREE 10 ML: 5 INJECTION INTRAVENOUS at 09:21

## 2024-03-08 RX ADMIN — ENOXAPARIN SODIUM 30 MG: 100 INJECTION SUBCUTANEOUS at 09:21

## 2024-03-08 RX ADMIN — OXYCODONE 10 MG: 5 TABLET ORAL at 09:19

## 2024-03-08 RX ADMIN — OXYCODONE 5 MG: 5 TABLET ORAL at 01:37

## 2024-03-08 RX ADMIN — ACETAMINOPHEN 1000 MG: 160 SOLUTION ORAL at 16:04

## 2024-03-08 RX ADMIN — NYSTATIN 500000 UNITS: 100000 SUSPENSION ORAL at 09:20

## 2024-03-08 RX ADMIN — CEFEPIME 2000 MG: 2 INJECTION, POWDER, FOR SOLUTION INTRAVENOUS at 16:12

## 2024-03-08 RX ADMIN — METRONIDAZOLE 500 MG: 250 TABLET ORAL at 06:09

## 2024-03-08 RX ADMIN — MICONAZOLE NITRATE: 2 POWDER TOPICAL at 09:21

## 2024-03-08 RX ADMIN — METRONIDAZOLE 500 MG: 250 TABLET ORAL at 16:05

## 2024-03-08 RX ADMIN — ACETAMINOPHEN 1000 MG: 160 SOLUTION ORAL at 04:30

## 2024-03-08 RX ADMIN — DOCUSATE SODIUM 100 MG: 100 CAPSULE, LIQUID FILLED ORAL at 09:19

## 2024-03-08 ASSESSMENT — PAIN SCALES - GENERAL
PAINLEVEL_OUTOF10: 5
PAINLEVEL_OUTOF10: 4

## 2024-03-08 ASSESSMENT — PAIN DESCRIPTION - LOCATION
LOCATION: ABDOMEN

## 2024-03-08 ASSESSMENT — PAIN DESCRIPTION - DESCRIPTORS
DESCRIPTORS: SORE
DESCRIPTORS: ACHING

## 2024-03-08 NOTE — CARE COORDINATION
3/8/2024  4:15 PM  Care Management Progress Note      ICD-10-CM    1. Diverticulitis  K57.92 oxyCODONE (ROXICODONE) 5 MG immediate release tablet          RUR:  16%  Risk Level: []Low [x]Moderate []High    Transition of care plan:  Discharge pending CT results. Therapy treating. Wound care following.  Home with HH with [facility] - Updated clinicals attached in [employer], and they were notified of pt's discharge plan. Per nursing, pt provided with wound care/ostomy supplies.  Outpatient follow-up.  Pt's family to transport.       [phone]   Service Assessment   Support Systems Spouse/Significant Other;Children   Discharge Planning   Type of Residence House   Living Arrangements Spouse/Significant Other;Children   Current Services Prior To Admission None   Potential Assistance Needed Home Care   Type of Home Care Services Nursing Services  (wound )   Patient expects to be discharged to: House   Services At/After Discharge   Services At/After Discharge Home Health;PT;Nursing services   Mode of Transport at Discharge Other (see comment)   Confirm Follow Up Transport Family

## 2024-03-08 NOTE — PROGRESS NOTES
[facility] at [facility]  [address] [address], [address] [facility] [address]  W: [phone]  F: [phone]      Reason for Visit:   Pily Howell is a 53 y.o. female who is seen today for evaluation of anemia      Interval History:   She reports a difficult night, didn't get much sleep.  Problems with her ostomy, as well as a yeast infection.  Dark stools, but denies danny bleeding.      Review of systems was obtained and pertinent findings reviewed above. Past medical history, social history, family history, medications, and allergies are located in the electronic medical record.    Physical Exam:   Visit Vitals  /79   Pulse 98   Temp 98.4 °F (36.9 °C) (Oral)   Resp 16   Ht 1.676 m (5' 5.98\")   Wt 102 kg (224 lb 13.9 oz)   SpO2 97%   BMI 36.31 kg/m²     General: no distress  Respiratory: normal respiratory effort  CV: no peripheral edema  Skin: no rashes; no petechiae; large ecchymotic area to rt forearm/elbow area    Results:     Lab Results   Component Value Date    WBC 7.4 03/05/2024    HGB 7.4 (L) 03/05/2024    HCT 24.2 (L) 03/05/2024     (H) 03/05/2024    MCV 85.2 03/05/2024    NEUTROABS 4.7 03/05/2024     Lab Results   Component Value Date     03/05/2024    K 3.6 03/05/2024     03/05/2024    CO2 28 03/05/2024    GLUCOSE 104 (H) 03/05/2024    BUN 9 03/05/2024    CREATININE 0.38 (L) 03/05/2024    LABGLOM >60 03/05/2024    CALCIUM 8.3 (L) 03/05/2024    MG 2.0 02/29/2024    PHOS 3.7 02/29/2024     Lab Results   Component Value Date    BILITOT 0.3 03/05/2024    ALT 10 (L) 03/05/2024    AST 12 (L) 03/05/2024    ALKPHOS 72 03/05/2024    PROT 6.2 (L) 03/05/2024    LABALBU 1.9 (L) 03/05/2024    GLOB 4.3 (H) 03/05/2024     Lab Results   Component Value Date    IRON 24 (L) 03/05/2024    TIBC 130 (L) 03/05/2024    IRONPERSAT 18 (L) 03/05/2024    FERRITIN 512 (H) 03/05/2024    [id number] 551 03/05/2024     03/05/2024    HAPTOGLOBIN 351 (H) 03/05/2024 
  [facility] at [facility]  [address], [address] [facility] [address]  W: [phone]  F: [phone]      Reason for Visit:   Pily Howell is a 53 y.o. female who is seen today for evaluation of anemia      Interval History:   Midline placed yesterday. However, they could not draw blood from it this morning, required peripheral stick.  Her mother is sick and hospitalized which has her quite concerned, tearful.      Review of systems was obtained and pertinent findings reviewed above. Past medical history, social history, family history, medications, and allergies are located in the electronic medical record.    Physical Exam:   Visit Vitals  /68   Pulse 83   Temp 98.6 °F (37 °C) (Oral)   Resp 18   Ht 1.676 m (5' 5.98\")   Wt 102 kg (224 lb 13.9 oz)   SpO2 97%   BMI 36.31 kg/m²     General: no distress  Respiratory: normal respiratory effort  CV: no peripheral edema  Skin: no rashes; no petechiae; large ecchymotic area to rt forearm/elbow area    Results:     Lab Results   Component Value Date    WBC 9.4 03/08/2024    HGB 7.3 (L) 03/08/2024    HCT 24.0 (L) 03/08/2024     (H) 03/08/2024    MCV 87.0 03/08/2024    NEUTROABS 4.7 03/05/2024     Lab Results   Component Value Date     03/05/2024    K 3.6 03/05/2024     03/05/2024    CO2 28 03/05/2024    GLUCOSE 104 (H) 03/05/2024    BUN 9 03/05/2024    CREATININE 0.38 (L) 03/05/2024    LABGLOM >60 03/05/2024    CALCIUM 8.3 (L) 03/05/2024    MG 2.0 02/29/2024    PHOS 3.7 02/29/2024     Lab Results   Component Value Date    BILITOT 0.3 03/05/2024    ALT 10 (L) 03/05/2024    AST 12 (L) 03/05/2024    ALKPHOS 72 03/05/2024    PROT 6.2 (L) 03/05/2024    LABALBU 1.9 (L) 03/05/2024    GLOB 4.3 (H) 03/05/2024     Lab Results   Component Value Date    RETICAUTO 3.1 (H) 03/07/2024    IRON 24 (L) 03/05/2024    TIBC 130 (L) 03/05/2024    IRONPERSAT 18 (L) 03/05/2024    FERRITIN 512 (H) 03/05/2024    [id number] 551 
  Physician Progress Note      PATIENT:               RONALD PICKARD  [facility] #:                  [id number]  :                       1970  ADMIT DATE:       2024 9:20 AM  DISCH DATE:  RESPONDING  PROVIDER #:        Francheska Buchanan MD          QUERY TEXT:    Patient admitted for ostomy reversal. Noted to have \"moderate malnutrition\"   documented in  assessment. If possible, please document in progress   notes and discharge summary if you are evaluating and /or treating any of the   following:    The medical record reflects the following:  Risk Factors: ostomy  Clinical Indicators:  RD: Malnutrition Status:  Moderate malnutrition   (24 1432)  Context:  Acute Illness  Findings of the 6 clinical characteristics of malnutrition:  Energy Intake:  50% or less of estimated energy requirements for 5 or more   days  Body Fat Loss:  Mild body fat loss  Fluid Accumulation:  Mild  Treatment: RD assessment, ONS 2 times daily, monitoring    ASPEN Criteria:    https://aspenjournals.onlinelibrary.james.com/doi/full/10.1177/400252228095614  5    Thank you,    Tamara Wset RN  CDI  Options provided:  -- Protein calorie malnutrition moderate  -- Other - I will add my own diagnosis  -- Disagree - Not applicable / Not valid  -- Disagree - Clinically unable to determine / Unknown  -- Refer to Clinical Documentation Reviewer    PROVIDER RESPONSE TEXT:    This patient has moderate protein calorie malnutrition.    Query created by: Tamara West on 3/1/2024 8:58 AM      Electronically signed by:  Francheska Buchanan MD 3/1/2024 10:32 AM          
  Physician Progress Note      PATIENT:               RONALD PICKARD  CSN #:                  [id number]  :                       1970  ADMIT DATE:       2024 9:20 AM  DISCH DATE:  RESPONDING  PROVIDER #:        Francheska Buchanan MD          QUERY TEXT:    Pt admitted for ostomy reversal with subsequent concern for anastomotic leak   and s/p repair of bowel perforation and creation of diverting periostomy. Pt   noted to have 3/3 PN with documentation of drop in Hgb, \"Hgb down again, will   give 1 unit PRBCs.\" If possible, please document in the progress notes and   discharge summary if you are evaluating and/or treating any of the following:    The medical record reflects the following:  Risk Factors: s/p surgery  Clinical Indicators: admitted for ostomy reversal with subsequent concern for   anastomotic leak and s/p repair of bowel perforation and creation of diverting   periostomy  -  surgery with  ml  - 3/3 PN: Hgb down again, will give 1 unit PRBCs  Treatment: PRBCs, monitoring    Thank you,    Tamara West RN  CDI  Options provided:  -- Acute blood loss anemia  -- Chronic blood loss anemia  -- Acute on chronic blood loss anemia  -- Postoperative acute blood loss anemia  -- Dilutional anemia  -- Precipitous drop in Hemoglobin and Hematocrit  -- Other - I will add my own diagnosis  -- Disagree - Not applicable / Not valid  -- Disagree - Clinically unable to determine / Unknown  -- Refer to Clinical Documentation Reviewer    PROVIDER RESPONSE TEXT:    Anemia of chronic disease    Query created by: Tamara West on 3/4/2024 8:50 AM      Electronically signed by:  Francheska Buchanan MD 3/6/2024 1:38 PM          
2000: Pt c/o new sharp abdominal pain that lasted 2-3 seconds and went away. Oxycodone given per request. She states it feels like it did when she had her bowel obstruction in December. VSS. Dr Sundar powell, advised to draw labs in AM and may order CT scan in AM.   2100: pt given PRN dilaudid, pt feeling nauseous and PRN zofran given.   2245: pt running low grade temp, 99.7. Scheduled tylenol given. Pt feeling anxious about these new pains. MD notified again, MD ordered lactic acid for morning labs, but still wants to wait for CT in AM. Pt notified of MD orders.   
Attempted US PIV. Unable to obtain line.   
Chart review. [employer] [facility] staff person gave verbal referral for follow up visit with patient named Pily Howell. Patient's nurse came into the room. Patient's nurse left the room. Patient was alone in the hospital room and shared her dilemma. Patient shared her medical issues and historical records. Patient also shared information about family dynamics and current medical situation that patient's mother is currently in. Patient is very concerned about her mom's wellbeing. Patient is sad, worried about her mom, and trying to heal at the same time from her injury. I provided peace, compassion, and empathy for patient to share. I also provided a prayers which included her mom, brother, and her daughter. Patient was thankful for the prayer. I will remain available if patient further needs a  to provide spiritual support. Please contact spiritual health services for further assistance needed..     Gino Dunn MDiv  Staff    Spiritual Health Services  Paging Service [phone] (PRAY)    
Chart review. I rounded on 4th floor and met and conversed with patient whose name is Pily Howell. Patient has been  to Gary Howell for 22 years. Patient and spouse have two children. Patient shared her extensive medical issues and historical records. Patient has a good support system which entails her family and her job. The patient works for [employer]. During difficult times, the patient faces them. Patient goes to [employer] with her son. I provided spiritual care, compassion, and empathy for the patent's medical condition. Patient was thankful for the visit and the encouragement. I also provided a healing/wellness prayer.     Gino Dunn MDiv  Staff    Spiritual Health Services  Paging Service [phone] (PRAY)    
Comprehensive Nutrition Assessment    Type and Reason for Visit: Initial, RD Nutrition Re-Screen/LOS    Nutrition Recommendations/Plan:   Added Low fiber to regular diet   Continue Ensure Enlive - Chocolate preferred   Monitor electrolytes       Malnutrition Assessment:  Malnutrition Status:  Moderate malnutrition (02/29/24 1432)    Context:  Acute Illness     Findings of the 6 clinical characteristics of malnutrition:  Energy Intake:  50% or less of estimated energy requirements for 5 or more days  Weight Loss:  No significant weight loss     Body Fat Loss:  Mild body fat loss     Muscle Mass Loss:  No significant muscle mass loss    Fluid Accumulation:  Mild     Strength:  Not Performed       Nutrition Assessment:    Past medical hx:       Diagnosis Date    Contact dermatitis and eczema due to cause     History of blood transfusion     History of vascular access device 12/27/2023    [facility] VAT 4fr single PICC R basilic 39cm for LT abx    Muscle pain     neck and shoulder    Seizure (HCC) 09/28/2023    ? seizure with syncopal episode       3/4:  Follow up - pt eating ~50% of meals most of the time. Didn't eat much of the eggs this morning 2/2 not wanting eggs \"every day.\" Gave pt another menu for her to order different breakfast options. Pt drinking Ensure. PCA pump still in use. Pt had 1 PRBC yesterday, Hgb 6.5 today. Pt drinking Gatorade and water and Ensure. Pt ileostomy bag \"almost full\" per pt.     Pt  screened for LOS. Pt POD 8 and revision day 1. Pt with ostomy creating in Dec 2023, here for reversal 2/22/24. Ileostomy creation 2/28/24. Likely needed for ~8 weeks per pt. Pt NPO currently. Monitor ability to restart diet and add ONS as able. Pt prefers Ensure Clear.  Pt has lost ~30 lbs over the last 3 months 2/2 not eating any boy  snacks (peanuts/popcorn/pecans) and other slight food modifications. Discussed hydration needs with ileostomy. Pt with PCA pump. Had been eating fair prior to NPO status. 
Comprehensive Nutrition Assessment    Type and Reason for Visit: Initial, RD Nutrition Re-Screen/LOS    Nutrition Recommendations/Plan:   Monitor diet progression - pt with GI bland diet previously and had been eating ~50% until 2/27. If pt is to be NPO x 3+ days, consider alternate means of nutrition.   Pt prefers Ensure Clear once diet starts  C/s if alternate nutrition needed  Monitor electrolytes       Malnutrition Assessment:  Malnutrition Status:  Moderate malnutrition (02/29/24 1432)    Context:  Acute Illness     Findings of the 6 clinical characteristics of malnutrition:  Energy Intake:  50% or less of estimated energy requirements for 5 or more days  Weight Loss:  No significant weight loss     Body Fat Loss:  Mild body fat loss     Muscle Mass Loss:  No significant muscle mass loss    Fluid Accumulation:  Mild     Strength:  Not Performed       Nutrition Assessment:    Past medical hx:       Diagnosis Date    Contact dermatitis and eczema due to cause     History of blood transfusion     History of vascular access device 12/27/2023    [facility] VAT 4fr single PICC R basilic 39cm for LT abx    Muscle pain     neck and shoulder    Seizure (HCC) 09/28/2023    ? seizure with syncopal episode       Pt  screened for LOS. Pt POD 8 and revision day 1. Pt with ostomy creating in Dec 2023, here for reversal 2/22/24. Ileostomy creation 2/28/24. Likely needed for ~8 weeks per pt. Pt NPO currently. Monitor ability to restart diet and add ONS as able. Pt prefers Ensure Clear.  Pt has lost ~30 lbs over the last 3 months 2/2 not eating any boy  snacks (peanuts/popcorn/pecans) and other slight food modifications. Discussed hydration needs with ileostomy. Pt with PCA pump. Had been eating fair prior to NPO status.       Current Nutrition Therapies:  Diet NPO  Meal Intake:   Patient Vitals for the past 168 hrs:   PO Meals Eaten (%)   02/27/24 1320 51 - 75%   02/27/24 0930 51 - 75%   02/25/24 0903 26 - 50%     Supplement 
Comprehensive Nutrition Assessment    Type and Reason for Visit: Reassess    Nutrition Recommendations/Plan:   Continue Low fiber diet   Continue Ensure Enlive TID - Chocolate preferred   Monitor electrolytes       Malnutrition Assessment:  Malnutrition Status:  Moderate malnutrition (02/29/24 1432)    Context:  Acute Illness     Findings of the 6 clinical characteristics of malnutrition:  Energy Intake:  50% or less of estimated energy requirements for 5 or more days  Weight Loss:  No significant weight loss     Body Fat Loss:  Mild body fat loss     Muscle Mass Loss:  No significant muscle mass loss    Fluid Accumulation:  Mild     Strength:  Not Performed       Nutrition Assessment:    Past medical hx:       Diagnosis Date    Contact dermatitis and eczema due to cause     History of blood transfusion     History of vascular access device 12/27/2023    [facility] VAT 4fr single PICC R basilic 39cm for LT abx    Muscle pain     neck and shoulder    Seizure (HCC) 09/28/2023    ? seizure with syncopal episode       3/8: F/u. Pt ate 1/2 of cereal, 100% of Ensure Enlive this morning. Pt reports surgeon to order repeat CT and possibly d/c later today depending on CT results. Pt with considerably active ostomy output. Discussed focusing on fluids and thickening foods. Pt reports mother is in different hospital, possibly on hospice, with COVID. Encourage PO and electrolyte balance. Information left in d/c paperwork.     3/4:  Follow up - pt eating ~50% of meals most of the time. Didn't eat much of the eggs this morning 2/2 not wanting eggs \"every day.\" Gave pt another menu for her to order different breakfast options. Pt drinking Ensure. PCA pump still in use. Pt had 1 PRBC yesterday, Hgb 6.5 today. Pt drinking Gatorade and water and Ensure. Pt ileostomy bag \"almost full\" per pt.     Pt  screened for LOS. Pt POD 8 and revision day 1. Pt with ostomy creating in Dec 2023, here for reversal 2/22/24. Ileostomy creation 2/28/24. 
Confirmed face to face with Dr. Buchanan, no PICC required, patient needs reliable access.    Ultrasound IV by Maria Dolores Euceda, RN, [facility] :  Procedure Note    Ultrasound IV education provided to patient. Opportunities for questions given.     Ultrasound used for PIV placement:  20 gauge 5.7 cm AccuCath Ace 5.7cm  left brachial and left forearm  locations.  1 X Attempt(s) each site.    Vigorous blood return present and saline flushes with ease.     Procedure tolerated well. Dr. Buchanan bedside and aware of IV placement and both lines added to LDA.     Right arm elevated on pillow due to previous PIV infiltration.     NICHOLE FLOR, RN   
Consulted for PICC line on this patient. Patient does not meet criteria for PICC line placement. Patient has 2 peripheral IV's currently, one is infiltrated and the other is leaking. Patient in need of 2 reliable accesses at this time. Call placed via perfect serve to Dr. Buchanan to discuss need and obtain clarification on order; per Yesica with answering service Dr. Mercer is to call back.   
Department of General Surgery - Adult  Surgical Service Progress Note      SUBJECTIVE:  Feeling better today.    OBJECTIVE      Physical    VITALS:  BP (!) 102/57   Pulse (!) 105   Temp 99.5 °F (37.5 °C)   Resp 18   Ht 1.676 m (5' 5.98\")   Wt 102 kg (224 lb 13.9 oz)   SpO2 98%   BMI 36.31 kg/m²   ABDOMEN:  soft, appropriately tender, inc c/d/I, ileostomy productive, right JOHN drain with stool  Data  CBC:   Lab Results   Component Value Date/Time    WBC 9.2 03/03/2024 04:56 AM    RBC 2.52 03/03/2024 04:56 AM    HGB 6.6 03/03/2024 04:56 AM    HCT 21.7 03/03/2024 04:56 AM    MCV 86.1 03/03/2024 04:56 AM    MCH 26.2 03/03/2024 04:56 AM    MCHC 30.4 03/03/2024 04:56 AM    RDW 15.0 03/03/2024 04:56 AM     03/03/2024 04:56 AM    MPV 9.4 03/03/2024 04:56 AM       Current Inpatient Medications    Current Facility-Administered Medications: 0.9 % sodium chloride infusion, , IntraVENous, PRN  ceFEPIme (MAXIPIME) 2,000 mg in sodium chloride 0.9 % 100 mL IVPB (mini-bag), 2,000 mg, IntraVENous, Q12H  potassium chloride (KLOR-CON) extended release tablet 40 mEq, 40 mEq, Oral, Once  acetaminophen (TYLENOL) tablet 1,000 mg, 1,000 mg, Oral, 4 times per day  ketorolac (TORADOL) injection 30 mg, 30 mg, IntraVENous, Q6H  0.9 % sodium chloride infusion, , IntraVENous, PRN  lactated ringers IV soln infusion, , IntraVENous, Continuous  metroNIDAZOLE (FLAGYL) 500 mg in 0.9% NaCl 100 mL IVPB premix, 500 mg, IntraVENous, Q8H  naloxone 0.4 mg in 10 mL sodium chloride syringe, , IntraVENous, PRN  polyethylene glycol (GLYCOLAX) packet 17 g, 17 g, Oral, Daily PRN  HYDROmorphone (DILAUDID) 1 mg/mL PCA, , IntraVENous, Continuous  acetaminophen (TYLENOL) 160 MG/5ML solution 650 mg, 650 mg, Oral, Q4H PRN  naloxegol (MOVANTIK) tablet 12.5 mg, 12.5 mg, Oral, QAM AC  oxyCODONE (ROXICODONE) immediate release tablet 10 mg, 10 mg, Oral, Q4H PRN  docusate sodium (COLACE) capsule 100 mg, 100 mg, Oral, BID  HYDROmorphone HCl PF (DILAUDID) 
Follow up spiritual care assessment with Mrs. Pily Howell on B4.  Assessed chart prior to encounter. Greeted Pt at bedside asking if she remembered  from when she was in ICU. She updated  on being in hospital for 22 days, discharged and back again going on 15 days now. She shared that she just had another surgery and is recovering from that, still in some discomfort when she moves. She still has more surgery's ahead, to remove colostomy bags and reversal surgery's, she said. She shared in sad tone that she doesn't attend [employer] like she feels she should; that she feels she needs to set aside more time for God and rest in her life, and asked for prayer for josé miguel to do so.  listened empathetically with a caring, supportive presence and prayed for her.  also shared helpful spiritual resources with her that will nourish her stacy to encourage her to fulfill her desires. She expressed much thankfulness and welcomed further visits and prayers.    Chaplain Chapito Jeffery M.Div., [facility].  [facility] Spiritual Health Team [phone]     
General Surgery    Feeling tired, but in good spirits.  Family got a puppy today.  /60   Pulse (!) 104   Temp 98.2 °F (36.8 °C) (Oral)   Resp 12   Ht 1.676 m (5' 5.98\")   Wt 102 kg (224 lb 13.9 oz)   SpO2 94%   BMI 36.31 kg/m²     Abd soft, inc c/d/I, left JOHN serous, right JOHN very thin brown, ileostomy    WBC up to 17    A/P: POD 2  Change abx  Add ensure  Watch Hgb  D/w pt and   
MIDLINE PLACEMENT NOTE    Midline catheters are NOT central lines.  Approved midline products are peripheral infusion devices with the tip terminating in the arm at or below the axillary vein, distal to the shoulder.  The tip DOES NOT ENTER THE CENTRAL VASCULATURE.  A Midline is for reliable short term (less than 30 days) vascular access.      PRE-PROCEDURE VERIFICATION  Correct Procedure: yes  Correct Site:  yes  Temperature: Temp: 97.3 °F (36.3 °C)  Recent Labs     03/05/24  0740 03/06/24  0839   BUN 9  --    * 530*   WBC 7.4 7.1     Allergies: Guaifenesin and Penicillins  Education materials for Midline Care given: yes. See Patient Education activity for further details.  Midline Booklet placed at bedside: yes    Midline Catheter Maintenance: For complete information reference Policy # [id number]    A. Dressing Changes       1.  Assess the dressing in the first 24 hours for accumulation of blood, fluid or moisture beneath the dressing.  During dressing changes, assess the external length of the catheter to determine if migration of the catheter has occurred.  Periodically confirm catheter placement, tip location, patency and security of dressing. Dressing changes should be done every 7 days, and PRN using sterile technique if integrity of dressing is compromised.  Apply new dressing per hospital policy.   Caution: Do not use scissors to remove dressing to minimize the risk of cutting the  Catheter.  B. Flushing       1.  Flush each lumen of the catheter with 10 mL of sterile saline every 12 hours or after each use.  In addition, lock each lumen of the catheter with sterile saline.   Note: Flush with 20 mL of sterile saline after blood therapy   Caution: DO NOT USE A SYRINGE SMALLER THAN 10 mL TO FLUSH AND CONFIRM PATENCY.  Patency should be assessed with a 10 mL or larger syringe and  sterile saline.  Upon confirmation of patency, administration of medication should be  given in a syringe appropriately sized 
Patient Fall Protocol  Yellow arm band applied to patient and yellow non skid socks placed on  Bed in low position, all side rails up, call bell in reach  Pt and Family instructed in \"call- don't fall\" protocol   -use your call bell, wait for assistance, staff not family will assist you to get up and move about   Pt and family verbalize understanding of fall precautions and the \"call don't fall\" Protocol  
Patient c/o burning with IV in left forearm. IV intact. Requested placement of IV by RRT RN. RRT RN unable to place.     IV too painful for cefpime at 0200. Unable to draw blood for labs.    Requesting Vascular to place line in Am.  
Pharmacy Dosing Services: 03/05/24    The pharmacist has determined that this patient meets P & T approved criteria for conversion from IV to oral therapy for the following medication:Flagyl 500 mg IV q8hr      The pharmacist has written the following order for the patient: Flagyl 500 mg po q8hr  The pharmacist will continue to monitor the patient's status and advise the physician if conversion back to IV therapy is recommended.    Signed MIGUEL ADAME, [facility]   [phone]    
Physical Therapy Note:  Initiated session with patient but activity limited  by ostomy leakage that worsened with activity and persisted. The patient ultimately required return to bed for nursing to address. Will abort and follow up next treatment day.  
Physical therapy services attempted @8:13AM. Per cursory review of medical chart, most recent hemoglobin 6.5 with downward trend. PT HOLD per medical status. PT Team will try to provide skilled services later as time permits and medically appropriate to patient tolerance.    Halie Tijerina, PT, DPT    ADDENDUM:  Physical therapy services again attempted @1:23PM. Per cursory review of medical chart, most recent documented hemoglobin remains @6.5.   Continued PT HOLD per medical status.    Halie Tijerina, PT, DPT      
Pt complain of reflux. Md on call paged. New order for pepcid and Tums  per Dr Shepherd on call. Will continue to monitor.  
Received call back from Dr. Galindo regarding a page by primary RN. Primary RN unable to take phone call. Dr. Galindo sts patient's hemoglobin came back this morning and blood was not ordered all day, Dr. Buchanan aware. No plan for blood transfusion at this time. Message passed on to primary RN Walker.  
SURGERY PROGRESS NOTE      Admit Date: 2024    POD 1 Day Post-Op    Procedure: Procedure(s):  LAPAROTOMY EXPLORATORY, REPAIR OF BOWEL PERFORATION, CREATION OF DIVERTING PERIOSTOMY      Subjective:   Feels well  Pain controlled with PCA      Objective:     /72   Pulse (!) 119   Temp 98.2 °F (36.8 °C) (Oral)   Resp 17   Ht 1.676 m (5' 5.98\")   Wt 102 kg (224 lb 13.9 oz)   SpO2 95%   BMI 36.31 kg/m²      Temp (24hrs), Av.6 °F (37 °C), Min:97.7 °F (36.5 °C), Max:99.3 °F (37.4 °C)      Physical Exam:     Abdomen:  Soft. Appropriately tender, non-distended.  Incision C/D/I. Ostomy with some bloody fluid        Assessment:     Principal Problem:    Diverticulitis  Resolved Problems:    * No resolved hospital problems. *      Plan/Recommendations/Medical Decision Making:   Conitnue PCA  Add toradol and tylenol  OOB as able  Ostomy care  Conitinue IV abx    
SURGERY PROGRESS NOTE      Admit Date: 2024    POD 1 Day Post-Op    Procedure: Procedure(s):  exploratory laparotomy reversal of ostomy, lysis of adhesions.      Subjective:   Feels well  Incisional pain as expected  Tolerating diet      Objective:     /67   Pulse (!) 103   Temp 97.9 °F (36.6 °C) (Oral)   Resp 16   Ht 1.676 m (5' 6\")   Wt 102 kg (224 lb 13.9 oz)   SpO2 94%   BMI 36.29 kg/m²      Temp (24hrs), Av.1 °F (36.7 °C), Min:97.3 °F (36.3 °C), Max:98.7 °F (37.1 °C)      Physical Exam:     Abdomen:  Soft.  Appropriately tender, non-distended.  Incision C/D/I. JOHN serosang        Assessment:     Principal Problem:    Diverticulitis  Resolved Problems:    * No resolved hospital problems. *      Plan/Recommendations/Medical Decision Making:   Remove byers  OOB ambulate  Diet as tolerates     
SURGERY PROGRESS NOTE      Admit Date: 2024    POD 2 Days Post-Op    Procedure: Procedure(s):  LAPAROTOMY EXPLORATORY, REPAIR OF BOWEL PERFORATION, CREATION OF DIVERTING PERIOSTOMY      Subjective:   Feels well  Some refllux today  + Ostomy output      Objective:     /65   Pulse 100   Temp 97.8 °F (36.6 °C)   Resp 16   Ht 1.676 m (5' 5.98\")   Wt 102 kg (224 lb 13.9 oz)   SpO2 96%   BMI 36.31 kg/m²      Temp (24hrs), Av.1 °F (36.7 °C), Min:97.5 °F (36.4 °C), Max:98.8 °F (37.1 °C)      Physical Exam:     Abdomen:  Soft.  Non-tender, non-distended.  Incision C/D/I. L JOHN serosang, R JOHN thin feculent material ostomy pink and productive        Assessment:     Principal Problem:    Diverticulitis  Resolved Problems:    * No resolved hospital problems. *      Plan/Recommendations/Medical Decision Making:   Regular diet today  Monitor JOHN output and clinical exam  OOB as able  Continue IV abx    
SURGERY PROGRESS NOTE      Admit Date: 2024    POD 2 Days Post-Op    Procedure: Procedure(s):  exploratory laparotomy reversal of ostomy, lysis of adhesions.      Subjective:   Feels well  Pain with movement  Tolerating diet      Objective:     /71   Pulse (!) 119   Temp 99.5 °F (37.5 °C) (Oral)   Resp 16   Ht 1.676 m (5' 6\")   Wt 102 kg (224 lb 13.9 oz)   SpO2 96%   BMI 36.29 kg/m²      Temp (24hrs), Av.4 °F (36.9 °C), Min:97.7 °F (36.5 °C), Max:99.5 °F (37.5 °C)      Physical Exam:     Abdomen:  Soft.  Appropriately tender, non-distended.  Incision C/D/I. JOHN serous        Assessment:     Principal Problem:    Diverticulitis  Resolved Problems:    * No resolved hospital problems. *      Plan/Recommendations/Medical Decision Making:   Diet as tolerates  OOB as able  PO and IV pain meds PRN  Add colace    
SURGERY PROGRESS NOTE      Admit Date: 2024    POD 3 Days Post-Op    Procedure: Procedure(s):  exploratory laparotomy reversal of ostomy, lysis of adhesions.      Subjective:   Feels well  Pain with movement  Tolerating diet  Confirms flatus    Objective:     /68   Pulse 99   Temp 98.8 °F (37.1 °C) (Oral)   Resp 18   Ht 1.676 m (5' 6\")   Wt 102 kg (224 lb 13.9 oz)   SpO2 94%   BMI 36.29 kg/m²      Temp (24hrs), Av.8 °F (37.1 °C), Min:98.4 °F (36.9 °C), Max:99.5 °F (37.5 °C)      Physical Exam:     Abdomen:  Soft.  Appropriately tender, non-distended.  Incision C/D/I. JOHN serous        Assessment:     Principal Problem:    Diverticulitis  Resolved Problems:    * No resolved hospital problems. *      Plan/Recommendations/Medical Decision Making:   Diet as tolerates  OOB as able  PO and IV pain meds PRN  Add colace  AM Labs    
SURGERY PROGRESS NOTE      Admit Date: 2024    POD 4 Days Post-Op    Procedure: Procedure(s):  exploratory laparotomy reversal of ostomy, lysis of adhesions.      Subjective:   Had sudden stabbing mid-abdomen pain overnight, currently a bit more sore than yesterday.  A few episodes of flatus, but no stool.  Tolearating diet.     Objective:     /67   Pulse 94   Temp 99 °F (37.2 °C) (Oral)   Resp 18   Ht 1.676 m (5' 6\")   Wt 102 kg (224 lb 13.9 oz)   SpO2 98%   BMI 36.29 kg/m²      Temp (24hrs), Av.7 °F (37.1 °C), Min:98.1 °F (36.7 °C), Max:99.7 °F (37.6 °C)      Physical Exam:     Abdomen:  Soft.  Appropriately tender, non-distended.  Incision C/D/I. JOHN serous        Assessment:     Principal Problem:    Diverticulitis  Resolved Problems:    * No resolved hospital problems. *      Plan/Recommendations/Medical Decision Making:   Diet as tolerates  OOB as able  PO and IV pain meds PRN.  Switch tylenol to liquid tylenol  Add colace, movantik.  AM Labs    
SURGERY PROGRESS NOTE      Admit Date: 2024    POD 5 Days Post-Op    Procedure: Procedure(s):  LAPAROTOMY EXPLORATORY, REPAIR OF BOWEL PERFORATION, CREATION OF DIVERTING PERIOSTOMY      Subjective:   Feels well  Tolerating diet  Feeling a bit down    Objective:     BP (!) 119/59   Pulse 82   Temp 98.8 °F (37.1 °C) (Oral)   Resp 18   Ht 1.676 m (5' 5.98\")   Wt 102 kg (224 lb 13.9 oz)   SpO2 96%   BMI 36.31 kg/m²      Temp (24hrs), Av.5 °F (36.9 °C), Min:97.7 °F (36.5 °C), Max:99.7 °F (37.6 °C)      Physical Exam:     Abdomen:  Soft.  Non-tender, non-distended.  Incision C/D/I. Ileostomy functional R JOHN feculent material, L serosang        Assessment:     Principal Problem:    Diverticulitis  Resolved Problems:    * No resolved hospital problems. *      Plan/Recommendations/Medical Decision Making:   OK for travel off the floor with family  Continue PCA for now  Add nystatin for oral thrush  Add iron for anemia  Consult hematology to help evaluate anemia  Hypokalemia replete    
SURGERY PROGRESS NOTE      Admit Date: 2024    POD 5 Days Post-Op    Procedure: Procedure(s):  exploratory laparotomy reversal of ostomy, lysis of adhesions.      Subjective:   Feels ok  Still with abd pain  No BM      Objective:     /67   Pulse 97   Temp 98.2 °F (36.8 °C) (Oral)   Resp 16   Ht 1.676 m (5' 6\")   Wt 102 kg (224 lb 13.9 oz)   SpO2 98%   BMI 36.29 kg/m²      Temp (24hrs), Av.7 °F (37.1 °C), Min:97.9 °F (36.6 °C), Max:100 °F (37.8 °C)      Physical Exam:     Abdomen:  Soft. tender, mildly distended.  Incision C/D/I. JOHN with minimal drainage        Assessment:     Principal Problem:    Diverticulitis  Resolved Problems:    * No resolved hospital problems. *      Plan/Recommendations/Medical Decision Making:   Hg low this am - continue to monitor repeat labs in am  OOB ambulate  Add miralax to help with BM    
SURGERY PROGRESS NOTE      Admit Date: 2024    POD 6 Days Post-Op    Procedure: Procedure(s):  LAPAROTOMY EXPLORATORY, REPAIR OF BOWEL PERFORATION, CREATION OF DIVERTING PERIOSTOMY      Subjective:   Feels well  Tolerating diet  Not using PCA much  Objective:     /71   Pulse 85   Temp 98.2 °F (36.8 °C) (Oral)   Resp 16   Ht 1.676 m (5' 5.98\")   Wt 102 kg (224 lb 13.9 oz)   SpO2 98%   BMI 36.31 kg/m²      Temp (24hrs), Av.5 °F (36.9 °C), Min:98.2 °F (36.8 °C), Max:98.8 °F (37.1 °C)      Physical Exam:     Abdomen:  Soft.  Non-tender, non-distended.  Incision C/D/I. Ileostomy functional R JOHN feculent material, L serosang        Assessment:     Principal Problem:    Diverticulitis  Resolved Problems:    * No resolved hospital problems. *      Plan/Recommendations/Medical Decision Making:   OK for travel off the floor with family  D/C PCA  Change Tylenol to around the clock  Continue Toradol around the clock  Start trying Roxicodone oral  IV dilaudid for breakthrough pain  nystatin for oral thrush   iron for anemia  Consult hematology to help evaluate anemia  Hypokalemia replete    
SURGERY PROGRESS NOTE      Admit Date: 2024    POD 6 Days Post-Op    Procedure: Procedure(s):  exploratory laparotomy reversal of ostomy, lysis of adhesions.      Subjective:   Bloody smear and flatus today.  Eating, but is full quickly.  Denies nausea.  Pain about the same.       Objective:     /62   Pulse (!) 102   Temp 98.8 °F (37.1 °C) (Oral)   Resp 18   Ht 1.676 m (5' 6\")   Wt 102 kg (224 lb 13.9 oz)   SpO2 98%   BMI 36.29 kg/m²      Temp (24hrs), Av °F (37.2 °C), Min:98.6 °F (37 °C), Max:100.2 °F (37.9 °C)      Physical Exam:     Abdomen:  Soft. tender, mildly distended.  Incision C/D/I. JOHN with minimal drainage        Assessment:     Principal Problem:    Diverticulitis  Resolved Problems:    * No resolved hospital problems. *      Plan/Recommendations/Medical Decision Making:   Low grade tachycardia overnight/today.  AM Labs  Diet as tolerated.  No change to care.     Sachin Kwok MD      
SURGERY PROGRESS NOTE      Admit Date: 2024    POD 7 Days Post-Op    Procedure: Procedure(s):  LAPAROTOMY EXPLORATORY, REPAIR OF BOWEL PERFORATION, CREATION OF DIVERTING PERIOSTOMY      Subjective:   Not feeling great today  Ostomy leaked overnight nursing unable to help her manage the bag change adequately      Objective:     /64   Pulse 91   Temp 98.1 °F (36.7 °C) (Oral)   Resp 18   Ht 1.676 m (5' 5.98\")   Wt 102 kg (224 lb 13.9 oz)   SpO2 98%   BMI 36.31 kg/m²      Temp (24hrs), Av.2 °F (36.8 °C), Min:98.1 °F (36.7 °C), Max:98.4 °F (36.9 °C)      Physical Exam:     Abdomen:  Soft.  Appropriately tender, non-distended.  Incision C/D/I. RLQ drain thin feculent fluid, LLQ serous        Assessment:     Principal Problem:    Diverticulitis  Resolved Problems:    * No resolved hospital problems. *      Plan/Recommendations/Medical Decision Making:   Diet as tolerates  OOB as able  Needs good ostomy care - skin is breaking down from improper management  Continue IV abx  Appreciate hematology input will continue to monitor Hg  
SURGERY PROGRESS NOTE      Admit Date: 2024    POD 8 Days Post-Op    Procedure: Procedure(s):  LAPAROTOMY EXPLORATORY, REPAIR OF BOWEL PERFORATION, CREATION OF DIVERTING PERIOSTOMY      Subjective:     Ostomy leaked overnight again  Esther diet but not taking in too much to eat      Objective:     /62   Pulse 88   Temp 97.3 °F (36.3 °C) (Oral)   Resp 16   Ht 1.676 m (5' 5.98\")   Wt 102 kg (224 lb 13.9 oz)   SpO2 98%   BMI 36.31 kg/m²      Temp (24hrs), Av °F (36.7 °C), Min:97.3 °F (36.3 °C), Max:98.2 °F (36.8 °C)      Physical Exam:     Abdomen:  Soft.  Appropriately tender, non-distended.  Incision C/D/I. RLQ drain thin feculent fluid (getting thinner), LLQ serous        Assessment:     Principal Problem:    Diverticulitis  Resolved Problems:    * No resolved hospital problems. *      Plan/Recommendations/Medical Decision Making:   Diet as tolerates  OOB as able  Needs good ostomy care - skin is breaking down. Removed ostomy bridge with wound care to see if this helps avoid leaking  Continue IV abx  Appreciate hematology input will continue to monitor Hg  
SURGERY PROGRESS NOTE      Admit Date: 2024    POD 9 Days Post-Op    Procedure: Procedure(s):  LAPAROTOMY EXPLORATORY, REPAIR OF BOWEL PERFORATION, CREATION OF DIVERTING PERIOSTOMY      Subjective:     Feeling better  Stressed over family illness    Objective:     /71   Pulse (!) 106   Temp 98.1 °F (36.7 °C) (Oral)   Resp 18   Ht 1.676 m (5' 5.98\")   Wt 102 kg (224 lb 13.9 oz)   SpO2 98%   BMI 36.31 kg/m²      Temp (24hrs), Av.2 °F (36.8 °C), Min:97.3 °F (36.3 °C), Max:99 °F (37.2 °C)      Physical Exam:     Abdomen:  Soft.  Non-tender, non-distended.  Incision C/D/I. Minimal output from drains        Assessment:     Principal Problem:    Diverticulitis  Resolved Problems:    * No resolved hospital problems. *      Plan/Recommendations/Medical Decision Making:   CT today to ensure no undrained fluid collections  Home if CT looks good  Remove L JOHN drain    
SURGERY PROGRESS NOTE      Admit Date: 2024    POD Day of Surgery    Procedure: Procedure(s):  LAPAROTOMY EXPLORATORY FOR ANASTOMOTIC LEAK      Subjective:   Feels well  Some flatus and sm BM      Objective:     /70   Pulse 98   Temp 98.6 °F (37 °C)   Resp 16   Ht 1.676 m (5' 6\")   Wt 102 kg (224 lb 13.9 oz)   SpO2 98%   BMI 36.29 kg/m²      Temp (24hrs), Av.8 °F (37.1 °C), Min:98.4 °F (36.9 °C), Max:99.3 °F (37.4 °C)      Physical Exam:     Abdomen:  Soft.  Appropriately tender, non-distended.  Incision C/D/I. JOHN minimal        Assessment:     Principal Problem:    Diverticulitis  Resolved Problems:    * No resolved hospital problems. *      Plan/Recommendations/Medical Decision Making:   CT scan completed today shows large volume free air and possible anastomotic leak  OR today for exploration    
Spiritual Consult: Pt requesting  to come.  Met patient at bedside and Pt began to share about her 73 yo mother who is now admitted in the hospital today. She has been on the phone with the Dr providing care for her mother. Poor diagnosis and she is a DNR, she said. Mrs. Howell is tearful for the reason she may loose her mother soon and feels powerless being stuck in the hospital dealing with her medical challenges. She turns to her Scientologist stacy and trust in God and leans on her  who also came at end of visit.  listened empathetically, validating her feelings and prayed with her as she was requesting. She and her  expressed much appreciation and gratitude.     Chaplain Chapito Jeffery M.Div., [facility].  [facility] Team [phone]    
Vascular Access Team:      Extended dwell powerglide pro ultrasound guided IV   Region: left basilic  Gauge 20   Length 8 cm  Performed by Bart REGAN    Brisk blood return noted.    Secured and dressed per protocol.    Alcohol cap utilized.    Patient tolerated well.    Primary RN Haim made aware line is ready for use.      Morena Arriaga RN   
perforation  Secondary to diverticulitis.  She has required multiple surgeries in recent months, most recently 2/28/2024.  --Management per general surgery      Pain: controlled   --general surgery managing        Signed By: Norm Engle MD

## 2024-03-09 NOTE — PLAN OF CARE
Problem: ABCDS Injury Assessment  Goal: Absence of physical injury  3/5/2024 1008 by Michael Mchugh RN  Outcome: Progressing  3/5/2024 0130 by Sachin Cordero RN  Outcome: Progressing     Problem: Pain  Goal: Verbalizes/displays adequate comfort level or baseline comfort level  3/5/2024 1008 by Michael Mchugh RN  Outcome: Progressing  3/5/2024 0130 by Sachin Cordero RN  Outcome: Progressing     Problem: Discharge Planning  Goal: Discharge to home or other facility with appropriate resources  3/5/2024 1008 by Michael Mchugh RN  Outcome: Progressing  3/5/2024 0130 by Sachin Cordero RN  Outcome: Progressing     Problem: Safety - Adult  Goal: Free from fall injury  3/5/2024 1008 by iMchael Mchugh RN  Outcome: Progressing  3/5/2024 0130 by Sachin Cordero RN  Outcome: Progressing     Problem: Respiratory - Adult  Goal: Achieves optimal ventilation and oxygenation  3/5/2024 1008 by Michael Mchugh RN  Outcome: Progressing  3/5/2024 0130 by Sachin Cordero RN  Outcome: Progressing     Problem: Skin/Tissue Integrity  Goal: Absence of new skin breakdown  Description: 1.  Monitor for areas of redness and/or skin breakdown  2.  Assess vascular access sites hourly  3.  Every 4-6 hours minimum:  Change oxygen saturation probe site  4.  Every 4-6 hours:  If on nasal continuous positive airway pressure, respiratory therapy assess nares and determine need for appliance change or resting period.  3/5/2024 1008 by Michael Mchugh RN  Outcome: Progressing  3/5/2024 0130 by Sachin Cordero RN  Outcome: Progressing     Problem: Nutrition Deficit:  Goal: Optimize nutritional status  3/5/2024 1008 by Michael Mchugh RN  Outcome: Progressing  3/5/2024 0130 by Sachin Cordero RN  Outcome: Progressing     
  Problem: ABCDS Injury Assessment  Goal: Absence of physical injury  3/6/2024 1025 by Michael Mchugh RN  Outcome: Progressing  3/6/2024 0203 by Colleen White RN  Outcome: Progressing     Problem: Pain  Goal: Verbalizes/displays adequate comfort level or baseline comfort level  3/6/2024 1025 by Michael Mchugh RN  Outcome: Progressing  3/6/2024 0203 by Colleen White RN  Outcome: Progressing     Problem: Discharge Planning  Goal: Discharge to home or other facility with appropriate resources  3/6/2024 1025 by Michael Mchugh RN  Outcome: Progressing  3/6/2024 0203 by Colleen White RN  Outcome: Progressing     Problem: Safety - Adult  Goal: Free from fall injury  3/6/2024 1025 by Michael Mchugh RN  Outcome: Progressing  3/6/2024 0203 by Colleen White RN  Outcome: Progressing     Problem: Respiratory - Adult  Goal: Achieves optimal ventilation and oxygenation  3/6/2024 1025 by Michael Mchugh RN  Outcome: Progressing  3/6/2024 0203 by Colleen White RN  Outcome: Progressing     Problem: Skin/Tissue Integrity  Goal: Absence of new skin breakdown  Description: 1.  Monitor for areas of redness and/or skin breakdown  2.  Assess vascular access sites hourly  3.  Every 4-6 hours minimum:  Change oxygen saturation probe site  4.  Every 4-6 hours:  If on nasal continuous positive airway pressure, respiratory therapy assess nares and determine need for appliance change or resting period.  3/6/2024 1025 by Michael Mchugh RN  Outcome: Progressing  3/6/2024 0203 by Colleen White RN  Outcome: Progressing     Problem: Nutrition Deficit:  Goal: Optimize nutritional status  3/6/2024 1025 by Michael Mchugh RN  Outcome: Progressing  3/6/2024 0203 by Colleen White RN  Outcome: Progressing     
  Problem: ABCDS Injury Assessment  Goal: Absence of physical injury  Outcome: HH/HSPC Progressing     Problem: Pain  Goal: Verbalizes/displays adequate comfort level or baseline comfort level  Outcome: HH/HSPC Progressing     Problem: Discharge Planning  Goal: Discharge to home or other facility with appropriate resources  Outcome: HH/HSPC Progressing     Problem: Safety - Adult  Goal: Free from fall injury  Outcome: HH/HSPC Progressing     Problem: Respiratory - Adult  Goal: Achieves optimal ventilation and oxygenation  Outcome: HH/HSPC Progressing     Problem: Skin/Tissue Integrity  Goal: Absence of new skin breakdown  Description: 1.  Monitor for areas of redness and/or skin breakdown  2.  Assess vascular access sites hourly  3.  Every 4-6 hours minimum:  Change oxygen saturation probe site  4.  Every 4-6 hours:  If on nasal continuous positive airway pressure, respiratory therapy assess nares and determine need for appliance change or resting period.  Outcome: HH/HSPC Progressing     Problem: Nutrition Deficit:  Goal: Optimize nutritional status  Outcome: HH/HSPC Progressing     
  Problem: ABCDS Injury Assessment  Goal: Absence of physical injury  Outcome: Progressing     Problem: Pain  Goal: Verbalizes/displays adequate comfort level or baseline comfort level  Outcome: Progressing     Problem: Discharge Planning  Goal: Discharge to home or other facility with appropriate resources  Outcome: Progressing     Problem: Safety - Adult  Goal: Free from fall injury  Outcome: Progressing     Problem: Physical Therapy - Adult  Goal: By Discharge: Performs mobility at highest level of function for planned discharge setting.  See evaluation for individualized goals.  Description: FUNCTIONAL STATUS PRIOR TO ADMISSION: Patient was independent and active without use of DME.    HOME SUPPORT PRIOR TO ADMISSION: The patient lived with spouse but did not require assistance.    Physical Therapy Goals  Initiated 2/26/2024  1.  Patient will move from supine to sit and sit to supine in bed with independence within 7 day(s).    2.  Patient will perform sit to stand with modified independence within 7 day(s).  3.  Patient will transfer from bed to chair and chair to bed with modified independence using the least restrictive device within 7 day(s).  4.  Patient will ambulate with supervision/set-up for 300 feet with the least restrictive device within 7 day(s).   5.  Patient will ascend/descend 15 stairs with  handrail(s) with contact guard assist within 7 day(s).  2/28/2024 1256 by Babs Agarwal, PT  Outcome: Progressing     Problem: Skin/Tissue Integrity  Goal: Absence of new skin breakdown  Description: 1.  Monitor for areas of redness and/or skin breakdown  2.  Assess vascular access sites hourly  3.  Every 4-6 hours minimum:  Change oxygen saturation probe site  4.  Every 4-6 hours:  If on nasal continuous positive airway pressure, respiratory therapy assess nares and determine need for appliance change or resting period.  Outcome: Progressing     
  Problem: ABCDS Injury Assessment  Goal: Absence of physical injury  Outcome: Progressing     Problem: Pain  Goal: Verbalizes/displays adequate comfort level or baseline comfort level  Outcome: Progressing     Problem: Discharge Planning  Goal: Discharge to home or other facility with appropriate resources  Outcome: Progressing     Problem: Safety - Adult  Goal: Free from fall injury  Outcome: Progressing     Problem: Respiratory - Adult  Goal: Achieves optimal ventilation and oxygenation  Outcome: Progressing     Problem: Physical Therapy - Adult  Goal: By Discharge: Performs mobility at highest level of function for planned discharge setting.  See evaluation for individualized goals.  Description: FUNCTIONAL STATUS PRIOR TO ADMISSION: Patient was independent and active without use of DME.    HOME SUPPORT PRIOR TO ADMISSION: The patient lived with spouse but did not require assistance.    Physical Therapy Goals  Initiated 2/26/2024; goals remain appropriate 2/29/2024; updated 3/5/2024   1.  Patient will move from supine to sit and sit to supine in bed with independence within 7 day(s).  MET  2.  Patient will perform sit to stand with modified independence within 7 day(s).  3.  Patient will transfer from bed to chair and chair to bed with modified independence using the least restrictive device within 7 day(s).  4.  Patient will ambulate with supervision/set-up for 300 feet with the least restrictive device within 7 day(s).   5.  Patient will ascend/descend 15 stairs with  handrail(s) with contact guard assist within 7 day(s).  3/5/2024 1302 by Brayden Brown, PT  Outcome: Progressing     Problem: Skin/Tissue Integrity  Goal: Absence of new skin breakdown  Description: 1.  Monitor for areas of redness and/or skin breakdown  2.  Assess vascular access sites hourly  3.  Every 4-6 hours minimum:  Change oxygen saturation probe site  4.  Every 4-6 hours:  If on nasal continuous positive airway pressure, respiratory 
  Problem: ABCDS Injury Assessment  Goal: Absence of physical injury  Outcome: Progressing     Problem: Pain  Goal: Verbalizes/displays adequate comfort level or baseline comfort level  Outcome: Progressing     Problem: Discharge Planning  Goal: Discharge to home or other facility with appropriate resources  Outcome: Progressing     Problem: Safety - Adult  Goal: Free from fall injury  Outcome: Progressing     Problem: Respiratory - Adult  Goal: Achieves optimal ventilation and oxygenation  Outcome: Progressing     Problem: Skin/Tissue Integrity  Goal: Absence of new skin breakdown  Description: 1.  Monitor for areas of redness and/or skin breakdown  2.  Assess vascular access sites hourly  3.  Every 4-6 hours minimum:  Change oxygen saturation probe site  4.  Every 4-6 hours:  If on nasal continuous positive airway pressure, respiratory therapy assess nares and determine need for appliance change or resting period.  Outcome: Progressing     Problem: Nutrition Deficit:  Goal: Optimize nutritional status  Outcome: Progressing     
  Problem: ABCDS Injury Assessment  Goal: Absence of physical injury  Outcome: Progressing     Problem: Pain  Goal: Verbalizes/displays adequate comfort level or baseline comfort level  Outcome: Progressing  Flowsheets (Taken 2/24/2024 0755)  Verbalizes/displays adequate comfort level or baseline comfort level:   Encourage patient to monitor pain and request assistance   Assess pain using appropriate pain scale   Administer analgesics based on type and severity of pain and evaluate response   Implement non-pharmacological measures as appropriate and evaluate response   Consider cultural and social influences on pain and pain management   Notify Licensed Independent Practitioner if interventions unsuccessful or patient reports new pain     Problem: Discharge Planning  Goal: Discharge to home or other facility with appropriate resources  Outcome: Progressing     Problem: Safety - Adult  Goal: Free from fall injury  Outcome: Progressing     
  Problem: Pain  Goal: Verbalizes/displays adequate comfort level or baseline comfort level  Outcome: Progressing  Flowsheets (Taken 2/22/2024 0136)  Verbalizes/displays adequate comfort level or baseline comfort level:   Assess pain using appropriate pain scale   Administer analgesics based on type and severity of pain and evaluate response   Encourage patient to monitor pain and request assistance   Implement non-pharmacological measures as appropriate and evaluate response     
  Problem: Physical Therapy - Adult  Goal: By Discharge: Performs mobility at highest level of function for planned discharge setting.  See evaluation for individualized goals.  Description: FUNCTIONAL STATUS PRIOR TO ADMISSION: Patient was independent and active without use of DME.    HOME SUPPORT PRIOR TO ADMISSION: The patient lived with spouse but did not require assistance.    Physical Therapy Goals  Initiated 2/26/2024  1.  Patient will move from supine to sit and sit to supine in bed with independence within 7 day(s).    2.  Patient will perform sit to stand with modified independence within 7 day(s).  3.  Patient will transfer from bed to chair and chair to bed with modified independence using the least restrictive device within 7 day(s).  4.  Patient will ambulate with supervision/set-up for 300 feet with the least restrictive device within 7 day(s).   5.  Patient will ascend/descend 15 stairs with  handrail(s) with contact guard assist within 7 day(s).  Outcome: Progressing   PHYSICAL THERAPY EVALUATION    Patient: Pily Howell (53 y.o. female)  Date: 2/26/2024  Primary Diagnosis: Attention to colostomy (HCC) [Z43.3]  Diverticulitis [K57.92]  Procedure(s) (LRB):  exploratory laparotomy reversal of ostomy, lysis of adhesions. (N/A) 5 Days Post-Op   Precautions: Restrictions/Precautions: Seizure (abdominal)  Required Braces or Orthoses?: Yes (binder) Required Braces or Orthoses?: Yes (binder)                    ASSESSMENT :   DEFICITS/IMPAIRMENTS:   The patient is limited by decreased functional mobility, independence in ADLs, strength, body mechanics, activity tolerance, increased pain levels.  Pt is post op day5 from reversal of ostomy.      Based on the impairments listed above pt needing Mod to Min A with supine to sit due to abdominal pain.  Binder in place.  Educated with log rolling.  CGA with sit <> stand with RW.  Gait of only 3' sidestepping due to Hgb of 6.5.  Pending PRBC today.  Orthostatic 
  Problem: Physical Therapy - Adult  Goal: By Discharge: Performs mobility at highest level of function for planned discharge setting.  See evaluation for individualized goals.  Description: FUNCTIONAL STATUS PRIOR TO ADMISSION: Patient was independent and active without use of DME.    HOME SUPPORT PRIOR TO ADMISSION: The patient lived with spouse but did not require assistance.    Physical Therapy Goals  Initiated 2/26/2024  1.  Patient will move from supine to sit and sit to supine in bed with independence within 7 day(s).    2.  Patient will perform sit to stand with modified independence within 7 day(s).  3.  Patient will transfer from bed to chair and chair to bed with modified independence using the least restrictive device within 7 day(s).  4.  Patient will ambulate with supervision/set-up for 300 feet with the least restrictive device within 7 day(s).   5.  Patient will ascend/descend 15 stairs with  handrail(s) with contact guard assist within 7 day(s).  Outcome: Progressing   PHYSICAL THERAPY TREATMENT    Patient: Pily Howell (53 y.o. female)  Date: 2/28/2024  Diagnosis: Attention to colostomy (HCC) [Z43.3]  Diverticulitis [K57.92] Diverticulitis  Procedure(s) (LRB):  exploratory laparotomy reversal of ostomy, lysis of adhesions. (N/A) 7 Days Post-Op  Precautions: Seizure (abdominal)                      ASSESSMENT:  Patient continues to benefit from skilled PT services and is progressing towards goals. Pt tolerated session well. Pt mobilized at supervision level for bed mobility via log roll technique, HOB was elevated. Pt was able to don abdominal binder independently in standing. Pt tolerated gait training with supervision without AD. Gait distance limited as transport was present to take off the floor for CT. Pt denied dizziness during upright activity. Pt reported she had walked the hallways this morning with RW without issue. Recommend home with HHPT upon discharge.         PLAN:  Patient 
  Problem: Physical Therapy - Adult  Goal: By Discharge: Performs mobility at highest level of function for planned discharge setting.  See evaluation for individualized goals.  Description: FUNCTIONAL STATUS PRIOR TO ADMISSION: Patient was independent and active without use of DME.    HOME SUPPORT PRIOR TO ADMISSION: The patient lived with spouse but did not require assistance.    Physical Therapy Goals  Initiated 2/26/2024; goals remain appropriate 2/29/2024   1.  Patient will move from supine to sit and sit to supine in bed with independence within 7 day(s).    2.  Patient will perform sit to stand with modified independence within 7 day(s).  3.  Patient will transfer from bed to chair and chair to bed with modified independence using the least restrictive device within 7 day(s).  4.  Patient will ambulate with supervision/set-up for 300 feet with the least restrictive device within 7 day(s).   5.  Patient will ascend/descend 15 stairs with  handrail(s) with contact guard assist within 7 day(s).  Outcome: Progressing   PHYSICAL THERAPY TREATMENT    Patient: Pily Howell (53 y.o. female)  Date: 2/29/2024  Diagnosis: Attention to colostomy (HCC) [Z43.3]  Diverticulitis [K57.92] Diverticulitis  Procedure(s) (LRB):  LAPAROTOMY EXPLORATORY, REPAIR OF BOWEL PERFORATION, CREATION OF DIVERTING PERIOSTOMY (N/A) 1 Day Post-Op  Precautions: Seizure (abdominal)                      ASSESSMENT:  Patient continues to benefit from skilled PT services and is progressing towards goals. Goals re-assessed following return to surgery 2/28 for an ex lap and bowel repair. Goals remain appropriate and continued. She required supervision - SBA for bed mobility and transfers. Noted HR elevation to ~120 while seated EOB and observed 138 BPM max during brief activity. BP soft but stable. Total activity limited d/t sustained HR 130s during standing activity. Gait training completed x20' in room using a RW requiring SBA. Returned to 
Patient discharged home with . Midline removed, LLQ drain removed. Supplies given for ostomy, drain and wound care. Discharge paperwork reviewed with the patient in detail. No questions or concerns.     Problem: ABCDS Injury Assessment  Goal: Absence of physical injury  Outcome: Completed     Problem: Pain  Goal: Verbalizes/displays adequate comfort level or baseline comfort level  Outcome: Completed     Problem: Discharge Planning  Goal: Discharge to home or other facility with appropriate resources  Outcome: Completed     Problem: Safety - Adult  Goal: Free from fall injury  Outcome: Completed     Problem: Respiratory - Adult  Goal: Achieves optimal ventilation and oxygenation  Outcome: Completed     Problem: Skin/Tissue Integrity  Goal: Absence of new skin breakdown  Description: 1.  Monitor for areas of redness and/or skin breakdown  2.  Assess vascular access sites hourly  3.  Every 4-6 hours minimum:  Change oxygen saturation probe site  4.  Every 4-6 hours:  If on nasal continuous positive airway pressure, respiratory therapy assess nares and determine need for appliance change or resting period.  Outcome: Completed     Problem: Nutrition Deficit:  Goal: Optimize nutritional status  Outcome: Completed     
Problem: Physical Therapy - Adult  Goal: By Discharge: Performs mobility at highest level of function for planned discharge setting.  See evaluation for individualized goals.  Description: FUNCTIONAL STATUS PRIOR TO ADMISSION: Patient was independent and active without use of DME.    HOME SUPPORT PRIOR TO ADMISSION: The patient lived with spouse but did not require assistance.    Physical Therapy Goals  Initiated 2/26/2024  1.  Patient will move from supine to sit and sit to supine in bed with independence within 7 day(s).    2.  Patient will perform sit to stand with modified independence within 7 day(s).  3.  Patient will transfer from bed to chair and chair to bed with modified independence using the least restrictive device within 7 day(s).  4.  Patient will ambulate with supervision/set-up for 300 feet with the least restrictive device within 7 day(s).   5.  Patient will ascend/descend 15 stairs with  handrail(s) with contact guard assist within 7 day(s).  Outcome: Progressing     PHYSICAL THERAPY TREATMENT    Patient: Pily Howell (53 y.o. female)  Date: 2/27/2024  Diagnosis: Attention to colostomy (HCC) [Z43.3]  Diverticulitis [K57.92] Diverticulitis  Procedure(s) (LRB):  exploratory laparotomy reversal of ostomy, lysis of adhesions. (N/A) 6 Days Post-Op  Precautions: Seizure (abdominal)                    ASSESSMENT:  Patient continues to benefit from skilled PT services and is progressing towards goals. Hgb improved to 8.4 and able to progress gait distance with use of rolling walker for light support per patient preference. Spo2 stable on room air and BP stable with activity but HR did increase to 129 bpm at highest with light activity, although stayed stable with self-paced gait. She has good understanding of post-operative precautions and able to don abdominal binder with modified independence. She reports she did start utilizing rolling walker somewhat recently at night given risk for seizures and 
HHPT follow up.           PLAN:  Patient continues to benefit from skilled intervention to address the above impairments.  Continue treatment per established plan of care.    Recommendation for discharge: (in order for the patient to meet his/her long term goals): Therapy 2 days/week in the home    Other factors to consider for discharge: no additional factors    IF patient discharges home will need the following DME: patient owns DME required for discharge       SUBJECTIVE:   Patient stated, \"I don't feel as great today.\"    OBJECTIVE DATA SUMMARY:   Critical Behavior:          Functional Mobility Training:  Bed Mobility:  Bed Mobility Training  Interventions: Verbal cues  Rolling: Supervision  Supine to Sit: Supervision;Additional time  Transfers:  Transfer Training  Overall Level of Assistance: Contact-guard assistance;Additional time  Interventions: Safety awareness training;Verbal cues  Sit to Stand: Supervision  Stand to Sit: Supervision  Balance:  Balance  Sitting: Intact  Standing: Intact (w/ support)   Ambulation/Gait Training:     Gait  Overall Level of Assistance: Supervision  Distance (ft): 200 Feet  Assistive Device: Gait belt;Walker, rolling  Interventions: Verbal cues  Base of Support: Widened  Speed/Mirna: Pace decreased (< 100 feet/min)  Gait Abnormalities: Decreased step clearance;Shuffling gait  Neuro Re-Education:                    Pain Ratin/10   Pain Intervention(s):   repositioning and PCA in use    Activity Tolerance:   Good and HR ~130 BPM during gait and SpO2 94%    After treatment:   Call bell within reach and seated on the commode. Notified nursing to assist patient when called.      COMMUNICATION/EDUCATION:   The patient's plan of care was discussed with: registered nurse           Brayden Brown, PT  Minutes: 23    
encouraged her to ambulate again with her  this evening.    Recommend stair assessment in the near future in preparation for discharge home.     Patient's progression toward goals since last assessment: Met bed mobility goal. Recommend bed mobility from flat bed           PLAN :  Goals have been updated based on progression since last assessment.  Patient continues to benefit from skilled intervention to address the above impairments.    Recommendations and Planned Interventions:   bed mobility training, transfer training, gait training, therapeutic exercises, and therapeutic activities    Frequency/Duration: Patient will be followed by physical therapy to address goals, PT Plan of Care: 3 times/week  per day/week to address goals.    Recommendation for discharge: (in order for the patient to meet his/her long term goals): Therapy 2 days/week in the home    Other factors to consider for discharge: no additional factors    IF patient discharges home will need the following DME: patient owns DME required for discharge       SUBJECTIVE:   Patient stated “Do I need to take the walker today?”    OBJECTIVE DATA SUMMARY:     Past Medical History:   Diagnosis Date    Contact dermatitis and eczema due to cause     History of blood transfusion     History of vascular access device 12/27/2023    [facility] VAT 4fr single PICC R basilic 39cm for LT abx    Muscle pain     neck and shoulder    Seizure (HCC) 09/28/2023    ? seizure with syncopal episode     Past Surgical History:   Procedure Laterality Date    COLOSTOMY CLOSURE N/A 2/21/2024    exploratory laparotomy reversal of ostomy, lysis of adhesions. performed by Francheska Buchanan MD at [facility] MAIN OR    LAPAROSCOPY N/A 12/18/2023    EXPLORATORY LAPAROTOMY, PARTIAL COLECTOMY, OMENECTOMY, ABDOMINAL WASHOUT, CREATION OF OSTOMY performed by Francheska Buchanan MD at [facility] MAIN OR    LAPAROTOMY N/A 12/10/2023    LAPAROTOMY EXPLORATORY, sigmoid colectomy performed by Heather Galindo

## 2024-03-09 NOTE — DISCHARGE SUMMARY
Discharge Summary    Date: 3/9/2024  Patient Name: Pily Howell    YOB: 1970     Age: 53 y.o.    Admit Date: 2/21/2024  Discharge Date:  Discharge Condition: Good    Admission Diagnosis  Attention to colostomy (HCC) [Z43.3];Diverticulitis [K57.92]      Discharge Diagnosis  Principal Problem:    Diverticulitis  Resolved Problems:    * No resolved hospital problems. *      Hospital Stay  Narrative of Hospital Course:  Pt admitted for ostomy reversal. She had a return to OR for free air and was noted to have several small holes in descending colon.  She had a diverting ileostomy and drain placement.  She made good progress with return of bowel function and mobility. She is discharged home with pain well controlled. Drain was left in place.    Consultants:  IP CONSULT TO ONCOLOGY  IP CONSULT TO CASE MANAGEMENT    Surgeries/procedures Performed:      Treatments:    Analgesia, Antibiotics and Surgery    Acetaminophen and Dilaudid, Cipro and Metronidazole    Discharge Plan/Disposition:  Home    Hospital/Incidental Findings Requiring Follow Up:    Patient Instructions:    Diet: Regular Diet    Activity:Activity as Tolerated  For number of days (if applicable):      Other Instructions:    Provider Follow-Up:   No follow-ups on file.     Significant Diagnostic Studies:    Recent Labs:  Admission on 02/21/2024, Discharged on 03/08/2024  No results displayed because visit has over 200 results.    ------------    Radiology last 7 days:  CT ABDOMEN PELVIS W IV CONTRAST Additional Contrast? None    Result Date: 3/8/2024  1.  A collection containing a large amount of gas and a small amount of fluid in the central pelvis appears smaller. 2.  There is a second collection with fluid and gas in the left paracolic gutter.       Pending Labs     Order Current Status    Extra Tube, Blood Bank In process    Methylmalonic Acid, Serum In process        Discharge Medications    Discharge Medication List as of 3/8/2024  4:54

## 2024-03-11 ENCOUNTER — HOSPITAL ENCOUNTER (OUTPATIENT)
Facility: HOSPITAL | Age: 54
Discharge: HOME | End: 2024-03-11
Attending: SURGERY
Payer: COMMERCIAL

## 2024-03-11 VITALS
SYSTOLIC BLOOD PRESSURE: 123 MMHG | DIASTOLIC BLOOD PRESSURE: 74 MMHG | HEART RATE: 102 BPM | TEMPERATURE: 97.7 F | RESPIRATION RATE: 16 BRPM

## 2024-03-11 LAB — METHYLMALONATE SERPL-SCNC: 164 NMOL/L (ref 0–378)

## 2024-03-11 PROCEDURE — 99213 OFFICE O/P EST LOW 20 MIN: CPT

## 2024-03-11 ASSESSMENT — PAIN DESCRIPTION - LOCATION: LOCATION: ABDOMEN

## 2024-03-11 ASSESSMENT — PAIN DESCRIPTION - PAIN TYPE: TYPE: ACUTE PAIN

## 2024-03-11 ASSESSMENT — PAIN DESCRIPTION - FREQUENCY: FREQUENCY: CONTINUOUS

## 2024-03-11 ASSESSMENT — PAIN - FUNCTIONAL ASSESSMENT: PAIN_FUNCTIONAL_ASSESSMENT: PREVENTS OR INTERFERES SOME ACTIVE ACTIVITIES AND ADLS

## 2024-03-11 ASSESSMENT — PAIN DESCRIPTION - DESCRIPTORS: DESCRIPTORS: TENDER;SORE;DULL

## 2024-03-11 ASSESSMENT — PAIN SCALES - GENERAL: PAINLEVEL_OUTOF10: 2

## 2024-03-11 ASSESSMENT — PAIN DESCRIPTION - ONSET: ONSET: ON-GOING

## 2024-03-11 NOTE — WOUND CARE
Clinical Level of Care Assessment    Outpatient Ostomy Care      NAME:  Pily Howell  YOB: 1970  MEDICAL RECORD NUMBER:  [id number]   DATE:  3/11/2024      Patient /Ostomy Assessment- Document in Flowsheet I&O   Points   Review of chart []   0   Assess the Complete Ostomy tab in Navigator for assessment of; stoma status, peristomal skin, presence of hernia/stool consistency/diet/related medications.   Simple adjustments to pouch size/pouch system, new stoma pattern, accessory addition/deletion.   []   1   Assess the Complete Ostomy tab in Navigator for assessment of; stoma status, peristomal skin, presence of hernia/stool consistency/diet/related medications.   Moderate adjustments to pouch size/pouch system, new stoma pattern, and accessory addition/deletion.  Observe patient/caregiver with hands-on care.   1-2 adjustments to pouch size/system/skincare/accessory addition or deletion.    []   2   Assess the Complete Ostomy tab in Navigator for assessment of; stoma status, peristomal skin, presence of hernia/stool consistency/diet/related medications.   Complex adjustments to pouch size/pouch system, new stoma pattern, accessory addition/deletion.  3 or more complex adjustments to pouch size/system/skincare/accessory addition or deletion.  Observe patient/caregiver with hands-on care.   Assess patient/patient's abdomen for optimal pre-marked stoma site.  Assess the patient's abdomen for a type of hernia belt/accessory needed. [x]   3         Ambulation Status Documented in CN Clinical Note  Status Definition Points   Independent Independently able to ambulate.  Fully able (without any assistance) to get on/off the exam table/chair.    [x]   0   Minimal Physical Assistance Requires physical assistance of one person to ambulate and position the patient to be examined. Includes necessary physical assistance to position lower extremities on/off the stool.   []   1   Moderate Physical Assistance 
OSTOMY Assessment    Stoma Tissue Assessment: _x__Post-op ___Follow-up       Type of Diversion: _x__New___ Established ___Revision___Permanent____Temporary    __x___  Ileostomy   _____  Colostomy: ____ Ascending, ____ Transverse, _____. Sigmoid   _____  Urostomy   _____  Ileal Conduit   _____  Mucous Fistula     Appearance of Ostomy:   _x__ Bright Red /Moist/Viable ___ Dark Red ___ Pink ___ Sloughing ___Necrotic____Pallor ____Red  ___Dry ____Moist    Stoma Size: _____35____ (mm) ___Round _x__ Oval ___Irregular     Stoma Height:   __x__Flush ____Retracted ____Budded ____Edematous ____Prolapse     Stoma Location  ____ Left Side          __x__Right Side     _____Umbilicus  _____Incision Line  ____ Above Belt       ____ Below Belt    Abdominal Contours  ____ Firm ____ Flat __x__Flabby _x__Soft ___Round ___ Hard ___ Other    GI Stoma Function: ___ Yes ___No   Output:   ____Sero-Sanguenous ____Sanguenous____ Bilious _x___ Liquid ____Semi-liquid ____ Pasty ____ Formed ___Soft ___Firm ____ Black____Brown ____Foul Odor    Peristomal skin:   ___ Intact _x__ Irritant Dermatitis ___ Allergic Contact Dermatitis ___Candidiasis ___Caput Medusae ___Folliculitis ___Mechanical Trauma ___Mucosal Transplantation    ___Pyoderma Gangrenosum ___Hyperplasia ___Radiation Trauma ___Allergies mpling  ___ Dimpling ____Blistered ____Fragile ____Macerated ___Peeling  ___Ulceration  ___ Fungal ___Peristomal Hernia ___Non-blanchable ___ Hypergranulation      Stoma Complications:   __Excessive Bleeding __Ischemia __ Abscess __Necrosis __Prolapse   __Hernia __ Retraction __Stenosis __Mucosal Separation __Melanosis Coli   __Laceration __Other     Application for Patient    Wafer and pouch used during assessment and education ____[place] [id number]________    Pouch System Recommended:   _x_One-Piece __Two-Piece ___Custom     Flat:   ___Pre-cut   __x_Cut-to fit     Convexity: ___Shallow ___Deep__x_ Flexible ___Creative Convexity   ___Pre-cut ___Cut to Fit 
Ostomy Care Plan  Risk for impaired skin integrity  Goal: Patient will exhibit intact peristomal skin at each visit.  Interventions: Assess patients peristomal skin at each visit.                            Instruct patient in peristomal skin care procedures at each visit.    Outcomes: Patient will demonstrate proper sizing and application of wafer.                       Patient will demonstrate intact peristomal skin without irritation.                       Patient will verbalize two strategies to prevent skin irritation.    Risk for disturbed body image  Goal: Patient will verbalize concerns in appearance, relationships and lifestyle changes.  Interventions: Assess patients coping mechanisms and body image issues at each visit.  Outcomes: Patient will verbalize/demonstrate comfort with body image as evidenced by willingness to manage ostomy care and acceptance of body image change.     Knowledge Deficit r/t Ostomy Care  Goal: Patient will verbalize/demonstrate independent ostomy care by end of teaching sessions.  Interventions: Assess patients understanding/ability to manage ostomy care including daily maintenance of pouch and intermittent changing of appliance.  Outcomes: Patient will demonstrate ability to remove, clean and reapply ostomy pouch.  Patient will verbalize understanding of ostomy pouching complications (i.e. failure to maintain a seal for at least 3 to 4 days, leaking of pouch, skin irritation of peristomal skin).     Goal 1 completed today, will continue with goal 2 at next visit.   
go to the hospital emergency room.     PLEASE NOTE: IF YOU ARE UNABLE TO OBTAIN WOUND SUPPLIES, CONTINUE TO USE THE SUPPLIES YOU HAVE AVAILABLE UNTIL YOU ARE ABLE TO REACH US. IT IS MOST IMPORTANT TO KEEP THE WOUND COVERED AT ALL TIMES.     CWON Signature:_______________________    Date: ___________ Time:  ____________

## 2024-03-30 ENCOUNTER — HOSPITAL ENCOUNTER (EMERGENCY)
Facility: HOSPITAL | Age: 54
Discharge: HOME | End: 2024-03-30
Attending: EMERGENCY MEDICINE
Payer: COMMERCIAL

## 2024-03-30 VITALS
TEMPERATURE: 97.9 F | WEIGHT: 224.87 LBS | BODY MASS INDEX: 36.14 KG/M2 | RESPIRATION RATE: 16 BRPM | DIASTOLIC BLOOD PRESSURE: 83 MMHG | SYSTOLIC BLOOD PRESSURE: 117 MMHG | OXYGEN SATURATION: 97 % | HEART RATE: 110 BPM | HEIGHT: 66 IN

## 2024-03-30 DIAGNOSIS — R74.01 TRANSAMINITIS: ICD-10-CM

## 2024-03-30 DIAGNOSIS — R53.83 OTHER FATIGUE: Primary | ICD-10-CM

## 2024-03-30 DIAGNOSIS — R42 LIGHTHEADEDNESS: ICD-10-CM

## 2024-03-30 LAB
ALBUMIN SERPL-MCNC: 3.8 G/DL (ref 3.5–5)
ALBUMIN/GLOB SERPL: 0.8 (ref 1.1–2.2)
ALP SERPL-CCNC: 303 U/L (ref 45–117)
ALT SERPL-CCNC: 277 U/L (ref 12–78)
ANION GAP SERPL CALC-SCNC: 8 MMOL/L (ref 5–15)
AST SERPL-CCNC: 92 U/L (ref 15–37)
BASOPHILS # BLD: 0.1 K/UL (ref 0–0.1)
BASOPHILS NFR BLD: 1 % (ref 0–1)
BILIRUB SERPL-MCNC: 0.5 MG/DL (ref 0.2–1)
BUN SERPL-MCNC: 16 MG/DL (ref 6–20)
BUN/CREAT SERPL: 18 (ref 12–20)
CALCIUM SERPL-MCNC: 9.8 MG/DL (ref 8.5–10.1)
CHLORIDE SERPL-SCNC: 103 MMOL/L (ref 97–108)
CO2 SERPL-SCNC: 19 MMOL/L (ref 21–32)
CREAT SERPL-MCNC: 0.88 MG/DL (ref 0.55–1.02)
DIFFERENTIAL METHOD BLD: ABNORMAL
EOSINOPHIL # BLD: 0.1 K/UL (ref 0–0.4)
EOSINOPHIL NFR BLD: 1 % (ref 0–7)
ERYTHROCYTE [DISTWIDTH] IN BLOOD BY AUTOMATED COUNT: 15.7 % (ref 11.5–14.5)
GLOBULIN SER CALC-MCNC: 5 G/DL (ref 2–4)
GLUCOSE SERPL-MCNC: 137 MG/DL (ref 65–100)
HCT VFR BLD AUTO: 37 % (ref 35–47)
HGB BLD-MCNC: 11.9 G/DL (ref 11.5–16)
IMM GRANULOCYTES # BLD AUTO: 0 K/UL (ref 0–0.04)
IMM GRANULOCYTES NFR BLD AUTO: 0 % (ref 0–0.5)
LYMPHOCYTES # BLD: 1.5 K/UL (ref 0.8–3.5)
LYMPHOCYTES NFR BLD: 16 % (ref 12–49)
MCH RBC QN AUTO: 27 PG (ref 26–34)
MCHC RBC AUTO-ENTMCNC: 32.2 G/DL (ref 30–36.5)
MCV RBC AUTO: 83.9 FL (ref 80–99)
MONOCYTES # BLD: 0.5 K/UL (ref 0–1)
MONOCYTES NFR BLD: 5 % (ref 5–13)
NEUTS SEG # BLD: 7.4 K/UL (ref 1.8–8)
NEUTS SEG NFR BLD: 77 % (ref 32–75)
NRBC # BLD: 0 K/UL (ref 0–0.01)
NRBC BLD-RTO: 0 PER 100 WBC
PLATELET # BLD AUTO: 418 K/UL (ref 150–400)
PMV BLD AUTO: 9.5 FL (ref 8.9–12.9)
POTASSIUM SERPL-SCNC: 3.9 MMOL/L (ref 3.5–5.1)
PROT SERPL-MCNC: 8.8 G/DL (ref 6.4–8.2)
RBC # BLD AUTO: 4.41 M/UL (ref 3.8–5.2)
RBC MORPH BLD: ABNORMAL
SODIUM SERPL-SCNC: 130 MMOL/L (ref 136–145)
WBC # BLD AUTO: 9.6 K/UL (ref 3.6–11)

## 2024-03-30 PROCEDURE — 85025 COMPLETE CBC W/AUTO DIFF WBC: CPT

## 2024-03-30 PROCEDURE — 84439 ASSAY OF FREE THYROXINE: CPT

## 2024-03-30 PROCEDURE — 80053 COMPREHEN METABOLIC PANEL: CPT

## 2024-03-30 PROCEDURE — 36415 COLL VENOUS BLD VENIPUNCTURE: CPT

## 2024-03-30 PROCEDURE — 99283 EMERGENCY DEPT VISIT LOW MDM: CPT

## 2024-03-30 PROCEDURE — 84443 ASSAY THYROID STIM HORMONE: CPT

## 2024-03-30 RX ORDER — ONDANSETRON 4 MG/1
4 TABLET, ORALLY DISINTEGRATING ORAL 3 TIMES DAILY PRN
Qty: 21 TABLET | Refills: 0 | Status: SHIPPED | OUTPATIENT
Start: 2024-03-30

## 2024-03-30 ASSESSMENT — PAIN - FUNCTIONAL ASSESSMENT: PAIN_FUNCTIONAL_ASSESSMENT: 0-10

## 2024-03-30 ASSESSMENT — PAIN SCALES - GENERAL: PAINLEVEL_OUTOF10: 0

## 2024-03-31 LAB
T4 FREE SERPL-MCNC: 1.1 NG/DL (ref 0.8–1.5)
TSH SERPL DL<=0.05 MIU/L-ACNC: 1.55 UIU/ML (ref 0.36–3.74)

## 2024-03-31 NOTE — ED PROVIDER NOTES
[facility] EMERGENCY DEPT  EMERGENCY DEPARTMENT ENCOUNTER      Pt Name: Pily Howell  MRN: [id number]  Birthdate 1970  Date of evaluation: 3/30/2024  Provider: Hi Baeza DO    CHIEF COMPLAINT       Chief Complaint   Patient presents with    Fatigue    Dizziness         HISTORY OF PRESENT ILLNESS   (Location/Symptom, Timing/Onset, Context/Setting, Quality, Duration, Modifying Factors, Severity)  Note limiting factors.   53-year-old female who comes in for not feeling very well.  She has lightheadedness and dizziness.  Multiple recent abdominal surgeries she has had multiple recent issues with chronic anemia of chronic disease.  She has had multiple blood transfusions.  She arrives pale somewhat weak saying that she just does not feel well.  This started during a car ride to and from [facility] today.  No chest pain shortness of breath fevers or chills.  No other acute complaints            Review of External Medical Records:     Nursing Notes were reviewed.    REVIEW OF SYSTEMS    (2-9 systems for level 4, 10 or more for level 5)     Review of Systems    Except as noted above the remainder of the review of systems was reviewed and negative.       PAST MEDICAL HISTORY     Past Medical History:   Diagnosis Date    Contact dermatitis and eczema due to cause     History of blood transfusion     History of vascular access device 12/27/2023    [facility] VAT 4fr single PICC R basilic 39cm for LT abx    Muscle pain     neck and shoulder    Seizure (HCC) 09/28/2023    ? seizure with syncopal episode         SURGICAL HISTORY       Past Surgical History:   Procedure Laterality Date    COLOSTOMY CLOSURE N/A 2/21/2024    exploratory laparotomy reversal of ostomy, lysis of adhesions. performed by Francheska Buchanan MD at [facility] OR    LAPAROSCOPY N/A 12/18/2023    EXPLORATORY LAPAROTOMY, PARTIAL COLECTOMY, OMENECTOMY, ABDOMINAL WASHOUT, CREATION OF OSTOMY performed by Francheska Buchanan MD at [facility] OR

## 2024-06-17 ENCOUNTER — HOSPITAL ENCOUNTER (OUTPATIENT)
Facility: HOSPITAL | Age: 54
Discharge: HOME | End: 2024-06-20
Payer: COMMERCIAL

## 2024-06-17 VITALS
WEIGHT: 203.6 LBS | BODY MASS INDEX: 32.72 KG/M2 | HEIGHT: 66 IN | HEART RATE: 78 BPM | OXYGEN SATURATION: 98 % | DIASTOLIC BLOOD PRESSURE: 76 MMHG | SYSTOLIC BLOOD PRESSURE: 113 MMHG | TEMPERATURE: 97.7 F | RESPIRATION RATE: 18 BRPM

## 2024-06-17 LAB
ABO + RH BLD: NORMAL
ALBUMIN SERPL-MCNC: 4 G/DL (ref 3.5–5)
ALBUMIN/GLOB SERPL: 1 (ref 1.1–2.2)
ALP SERPL-CCNC: 142 U/L (ref 45–117)
ALT SERPL-CCNC: 37 U/L (ref 12–78)
ANION GAP SERPL CALC-SCNC: 5 MMOL/L (ref 5–15)
AST SERPL-CCNC: 16 U/L (ref 15–37)
BILIRUB SERPL-MCNC: 0.6 MG/DL (ref 0.2–1)
BLOOD GROUP ANTIBODIES SERPL: NORMAL
BUN SERPL-MCNC: 12 MG/DL (ref 6–20)
BUN/CREAT SERPL: 14 (ref 12–20)
CALCIUM SERPL-MCNC: 9.9 MG/DL (ref 8.5–10.1)
CHLORIDE SERPL-SCNC: 106 MMOL/L (ref 97–108)
CO2 SERPL-SCNC: 24 MMOL/L (ref 21–32)
CREAT SERPL-MCNC: 0.88 MG/DL (ref 0.55–1.02)
ERYTHROCYTE [DISTWIDTH] IN BLOOD BY AUTOMATED COUNT: 14.1 % (ref 11.5–14.5)
EST. AVERAGE GLUCOSE BLD GHB EST-MCNC: 91 MG/DL
GLOBULIN SER CALC-MCNC: 3.9 G/DL (ref 2–4)
GLUCOSE SERPL-MCNC: 104 MG/DL (ref 65–100)
HBA1C MFR BLD: 4.8 % (ref 4–5.6)
HCT VFR BLD AUTO: 41 % (ref 35–47)
HGB BLD-MCNC: 13.2 G/DL (ref 11.5–16)
MCH RBC QN AUTO: 27.2 PG (ref 26–34)
MCHC RBC AUTO-ENTMCNC: 32.2 G/DL (ref 30–36.5)
MCV RBC AUTO: 84.5 FL (ref 80–99)
NRBC # BLD: 0 K/UL (ref 0–0.01)
NRBC BLD-RTO: 0 PER 100 WBC
PLATELET # BLD AUTO: 350 K/UL (ref 150–400)
PMV BLD AUTO: 10.4 FL (ref 8.9–12.9)
POTASSIUM SERPL-SCNC: 3.9 MMOL/L (ref 3.5–5.1)
PROT SERPL-MCNC: 7.9 G/DL (ref 6.4–8.2)
RBC # BLD AUTO: 4.85 M/UL (ref 3.8–5.2)
SODIUM SERPL-SCNC: 135 MMOL/L (ref 136–145)
SPECIMEN EXP DATE BLD: NORMAL
WBC # BLD AUTO: 7.1 K/UL (ref 3.6–11)

## 2024-06-17 PROCEDURE — 83036 HEMOGLOBIN GLYCOSYLATED A1C: CPT

## 2024-06-17 PROCEDURE — 86900 BLOOD TYPING SEROLOGIC ABO: CPT

## 2024-06-17 PROCEDURE — 71046 X-RAY EXAM CHEST 2 VIEWS: CPT

## 2024-06-17 PROCEDURE — 80053 COMPREHEN METABOLIC PANEL: CPT

## 2024-06-17 PROCEDURE — 93005 ELECTROCARDIOGRAM TRACING: CPT | Performed by: SURGERY

## 2024-06-17 PROCEDURE — 85027 COMPLETE CBC AUTOMATED: CPT

## 2024-06-17 PROCEDURE — 86901 BLOOD TYPING SEROLOGIC RH(D): CPT

## 2024-06-17 PROCEDURE — 86850 RBC ANTIBODY SCREEN: CPT

## 2024-06-17 PROCEDURE — 36415 COLL VENOUS BLD VENIPUNCTURE: CPT

## 2024-06-17 NOTE — PERIOP NOTE
Last office notes requested from Dr. Peña (cardiology) and Dr. Birmingham (neurology) to be faxed to PATChristina

## 2024-06-17 NOTE — PERIOP NOTE
[facility]                   [address] [place] [address]   MAIN OR                                  [phone]   MAIN PRE OP           [phone]                                                                                AMBULATORY PRE OP          [phone]  PRE-ADMISSION TESTING    [phone]   Surgery Date:  6/26/2024       Is surgery arrival time given by surgeon?  NO  If “NO”, [facility] staff will call you between 3 and 7pm the day before your surgery with your arrival time. (If your surgery is on a Monday, we will call you the Friday before.)    Call [phone] after 7pm Monday-Friday if you did not receive this call.    INSTRUCTIONS BEFORE YOUR SURGERY   When You  Arrive Arrive at the 2nd Floor Admitting Desk on the day of your surgery  Have your insurance card, photo ID,living will/advanced directive/POA (if applicable),  and any copayment (if needed)   Food   and   Drink NO food or drink after midnight the night before surgery    This means NO water, gum, mints, coffee, juice, etc.  No alcohol (beer, wine, liquor) or marijuana 24 hours before and after surgery   Medications to   TAKE   Morning of Surgery MEDICATIONS TO TAKE THE MORNING OF SURGERY WITH A SIP OF WATER:   none   Medications  To  STOP      7 days before surgery Non-Steroidal anti-inflammatory Drugs (NSAID's): for example, Ibuprofen (Advil, Motrin), Naproxen (Aleve)  Aspirin, if taking for pain   Herbal supplements, vitamins, and fish oil  (Pain medications not listed above, including Tylenol may be taken)   Blood  Thinners If you take  Aspirin, Plavix, Coumadin, or any blood-thinning or anti-blood clot medicine, talk to the doctor who prescribed the medications for pre-operative instructions.   Bathing Clothing  Jewelry  Valuables     If you shower the morning of surgery, please do not apply anything to your skin (lotions, powders, deodorant, or makeup, especially mascara)  Follow

## 2024-06-17 NOTE — PERIOP NOTE
ERAS protocol and handbooks reviewed with patient. Handbooks given. Bowel prep reviewed. Supplements reviewed.  All questions answered.   Pre-Operative Nutrition Score        Has the patient had an unplanned weight loss of 10%  of body weight or more in the last 6 months? yes    Has the patient angelita eating less than 50% of their normal diet in the preceding week? no    Patient instructed on non diabetic pre-operative nutrition plan to start 5 days prior to surgery.    Patient given 10 shakes and 3 pre-surgery drinks.    Opportunity given for questions and all questions answered.

## 2024-06-18 LAB
EKG ATRIAL RATE: 68 BPM
EKG DIAGNOSIS: NORMAL
EKG P AXIS: 36 DEGREES
EKG P-R INTERVAL: 156 MS
EKG Q-T INTERVAL: 422 MS
EKG QRS DURATION: 88 MS
EKG QTC CALCULATION (BAZETT): 448 MS
EKG R AXIS: 43 DEGREES
EKG T AXIS: 41 DEGREES
EKG VENTRICULAR RATE: 68 BPM

## 2024-06-18 NOTE — PERIOP NOTE
Requested the last OV note and any testing from Dr. Birmingham's office, neurologist.  DOS: 6/26/2024

## 2024-06-20 ENCOUNTER — ANESTHESIA EVENT (OUTPATIENT)
Facility: HOSPITAL | Age: 54
End: 2024-06-20
Payer: COMMERCIAL

## 2024-06-25 NOTE — PERIOP NOTE
The H&P received from Dr. Buchanan's office is not signed.  Left a VM for Leann at Dr. Buchaann's office requesting that a signed copy of the H&P be faxed to the Main pre-op.  DOS: 6/26/2024

## 2024-06-26 ENCOUNTER — ANESTHESIA (OUTPATIENT)
Facility: HOSPITAL | Age: 54
End: 2024-06-26
Payer: COMMERCIAL

## 2024-06-26 ENCOUNTER — HOSPITAL ENCOUNTER (INPATIENT)
Facility: HOSPITAL | Age: 54
LOS: 2 days | Discharge: HOME | End: 2024-06-28
Attending: SURGERY | Admitting: SURGERY
Payer: COMMERCIAL

## 2024-06-26 DIAGNOSIS — Z93.2 ILEOSTOMY IN PLACE (HCC): Primary | ICD-10-CM

## 2024-06-26 PROCEDURE — 6370000000 HC RX 637 (ALT 250 FOR IP): Performed by: SURGERY

## 2024-06-26 PROCEDURE — 6360000002 HC RX W HCPCS: Performed by: NURSE ANESTHETIST, CERTIFIED REGISTERED

## 2024-06-26 PROCEDURE — 6360000002 HC RX W HCPCS: Performed by: SURGERY

## 2024-06-26 PROCEDURE — 2580000003 HC RX 258: Performed by: SURGERY

## 2024-06-26 PROCEDURE — 3600000014 HC SURGERY LEVEL 4 ADDTL 15MIN: Performed by: SURGERY

## 2024-06-26 PROCEDURE — 2580000003 HC RX 258: Performed by: ANESTHESIOLOGY

## 2024-06-26 PROCEDURE — 3700000000 HC ANESTHESIA ATTENDED CARE: Performed by: SURGERY

## 2024-06-26 PROCEDURE — 94761 N-INVAS EAR/PLS OXIMETRY MLT: CPT

## 2024-06-26 PROCEDURE — 0DBB0ZZ EXCISION OF ILEUM, OPEN APPROACH: ICD-10-PCS | Performed by: SURGERY

## 2024-06-26 PROCEDURE — 7100000000 HC PACU RECOVERY - FIRST 15 MIN: Performed by: SURGERY

## 2024-06-26 PROCEDURE — 3700000001 HC ADD 15 MINUTES (ANESTHESIA): Performed by: SURGERY

## 2024-06-26 PROCEDURE — 1100000000 HC RM PRIVATE

## 2024-06-26 PROCEDURE — 2709999900 HC NON-CHARGEABLE SUPPLY: Performed by: SURGERY

## 2024-06-26 PROCEDURE — 2720000010 HC SURG SUPPLY STERILE: Performed by: SURGERY

## 2024-06-26 PROCEDURE — 2580000003 HC RX 258: Performed by: NURSE ANESTHETIST, CERTIFIED REGISTERED

## 2024-06-26 PROCEDURE — 88304 TISSUE EXAM BY PATHOLOGIST: CPT

## 2024-06-26 PROCEDURE — 3600000004 HC SURGERY LEVEL 4 BASE: Performed by: SURGERY

## 2024-06-26 PROCEDURE — 6360000002 HC RX W HCPCS: Performed by: ANESTHESIOLOGY

## 2024-06-26 PROCEDURE — 2500000003 HC RX 250 WO HCPCS: Performed by: NURSE ANESTHETIST, CERTIFIED REGISTERED

## 2024-06-26 PROCEDURE — 7100000001 HC PACU RECOVERY - ADDTL 15 MIN: Performed by: SURGERY

## 2024-06-26 RX ORDER — LIDOCAINE HYDROCHLORIDE 20 MG/ML
INJECTION, SOLUTION EPIDURAL; INFILTRATION; INTRACAUDAL; PERINEURAL PRN
Status: DISCONTINUED | OUTPATIENT
Start: 2024-06-26 | End: 2024-06-26 | Stop reason: SDUPTHER

## 2024-06-26 RX ORDER — SODIUM CHLORIDE, SODIUM LACTATE, POTASSIUM CHLORIDE, CALCIUM CHLORIDE 600; 310; 30; 20 MG/100ML; MG/100ML; MG/100ML; MG/100ML
INJECTION, SOLUTION INTRAVENOUS CONTINUOUS
Status: DISCONTINUED | OUTPATIENT
Start: 2024-06-26 | End: 2024-06-26 | Stop reason: HOSPADM

## 2024-06-26 RX ORDER — FENTANYL CITRATE 50 UG/ML
100 INJECTION, SOLUTION INTRAMUSCULAR; INTRAVENOUS
Status: DISCONTINUED | OUTPATIENT
Start: 2024-06-26 | End: 2024-06-26 | Stop reason: HOSPADM

## 2024-06-26 RX ORDER — SODIUM CHLORIDE, SODIUM LACTATE, POTASSIUM CHLORIDE, CALCIUM CHLORIDE 600; 310; 30; 20 MG/100ML; MG/100ML; MG/100ML; MG/100ML
INJECTION, SOLUTION INTRAVENOUS CONTINUOUS PRN
Status: DISCONTINUED | OUTPATIENT
Start: 2024-06-26 | End: 2024-06-26 | Stop reason: SDUPTHER

## 2024-06-26 RX ORDER — MAGNESIUM SULFATE HEPTAHYDRATE 40 MG/ML
INJECTION, SOLUTION INTRAVENOUS PRN
Status: DISCONTINUED | OUTPATIENT
Start: 2024-06-26 | End: 2024-06-26 | Stop reason: SDUPTHER

## 2024-06-26 RX ORDER — KETAMINE HCL IN NACL, ISO-OSM 100MG/10ML
SYRINGE (ML) INJECTION PRN
Status: DISCONTINUED | OUTPATIENT
Start: 2024-06-26 | End: 2024-06-26 | Stop reason: SDUPTHER

## 2024-06-26 RX ORDER — BUPIVACAINE HYDROCHLORIDE 5 MG/ML
INJECTION, SOLUTION EPIDURAL; INTRACAUDAL PRN
Status: DISCONTINUED | OUTPATIENT
Start: 2024-06-26 | End: 2024-06-26 | Stop reason: ALTCHOICE

## 2024-06-26 RX ORDER — DEXAMETHASONE SODIUM PHOSPHATE 4 MG/ML
INJECTION, SOLUTION INTRA-ARTICULAR; INTRALESIONAL; INTRAMUSCULAR; INTRAVENOUS; SOFT TISSUE PRN
Status: DISCONTINUED | OUTPATIENT
Start: 2024-06-26 | End: 2024-06-26 | Stop reason: SDUPTHER

## 2024-06-26 RX ORDER — EPHEDRINE SULFATE/0.9% NACL/PF 25 MG/5 ML
SYRINGE (ML) INTRAVENOUS PRN
Status: DISCONTINUED | OUTPATIENT
Start: 2024-06-26 | End: 2024-06-26 | Stop reason: SDUPTHER

## 2024-06-26 RX ORDER — ROCURONIUM BROMIDE 10 MG/ML
INJECTION, SOLUTION INTRAVENOUS PRN
Status: DISCONTINUED | OUTPATIENT
Start: 2024-06-26 | End: 2024-06-26 | Stop reason: SDUPTHER

## 2024-06-26 RX ORDER — DIPHENHYDRAMINE HYDROCHLORIDE 50 MG/ML
12.5 INJECTION INTRAMUSCULAR; INTRAVENOUS
Status: DISCONTINUED | OUTPATIENT
Start: 2024-06-26 | End: 2024-06-26 | Stop reason: HOSPADM

## 2024-06-26 RX ORDER — MAGNESIUM SULFATE IN WATER 40 MG/ML
2000 INJECTION, SOLUTION INTRAVENOUS PRN
Status: DISCONTINUED | OUTPATIENT
Start: 2024-06-26 | End: 2024-06-28 | Stop reason: HOSPADM

## 2024-06-26 RX ORDER — CELECOXIB 100 MG/1
200 CAPSULE ORAL ONCE
Status: COMPLETED | OUTPATIENT
Start: 2024-06-26 | End: 2024-06-26

## 2024-06-26 RX ORDER — ENOXAPARIN SODIUM 100 MG/ML
40 INJECTION SUBCUTANEOUS DAILY
Status: DISCONTINUED | OUTPATIENT
Start: 2024-06-26 | End: 2024-06-28 | Stop reason: HOSPADM

## 2024-06-26 RX ORDER — MIDAZOLAM HYDROCHLORIDE 2 MG/2ML
2 INJECTION, SOLUTION INTRAMUSCULAR; INTRAVENOUS
Status: DISCONTINUED | OUTPATIENT
Start: 2024-06-26 | End: 2024-06-26 | Stop reason: HOSPADM

## 2024-06-26 RX ORDER — OXYCODONE HYDROCHLORIDE 5 MG/1
5 TABLET ORAL EVERY 4 HOURS PRN
Status: DISCONTINUED | OUTPATIENT
Start: 2024-06-26 | End: 2024-06-28 | Stop reason: HOSPADM

## 2024-06-26 RX ORDER — ACETAMINOPHEN 500 MG
1000 TABLET ORAL ONCE
Status: COMPLETED | OUTPATIENT
Start: 2024-06-26 | End: 2024-06-26

## 2024-06-26 RX ORDER — PROPOFOL 10 MG/ML
INJECTION, EMULSION INTRAVENOUS CONTINUOUS PRN
Status: DISCONTINUED | OUTPATIENT
Start: 2024-06-26 | End: 2024-06-26 | Stop reason: SDUPTHER

## 2024-06-26 RX ORDER — SODIUM CHLORIDE 9 MG/ML
INJECTION, SOLUTION INTRAVENOUS PRN
Status: DISCONTINUED | OUTPATIENT
Start: 2024-06-26 | End: 2024-06-28 | Stop reason: HOSPADM

## 2024-06-26 RX ORDER — ONDANSETRON 2 MG/ML
INJECTION INTRAMUSCULAR; INTRAVENOUS PRN
Status: DISCONTINUED | OUTPATIENT
Start: 2024-06-26 | End: 2024-06-26 | Stop reason: SDUPTHER

## 2024-06-26 RX ORDER — POTASSIUM CHLORIDE 7.45 MG/ML
10 INJECTION INTRAVENOUS PRN
Status: DISCONTINUED | OUTPATIENT
Start: 2024-06-26 | End: 2024-06-28 | Stop reason: HOSPADM

## 2024-06-26 RX ORDER — DEXMEDETOMIDINE HYDROCHLORIDE 100 UG/ML
INJECTION, SOLUTION INTRAVENOUS PRN
Status: DISCONTINUED | OUTPATIENT
Start: 2024-06-26 | End: 2024-06-26 | Stop reason: SDUPTHER

## 2024-06-26 RX ORDER — ONDANSETRON 2 MG/ML
4 INJECTION INTRAMUSCULAR; INTRAVENOUS
Status: DISCONTINUED | OUTPATIENT
Start: 2024-06-26 | End: 2024-06-26 | Stop reason: HOSPADM

## 2024-06-26 RX ORDER — ONDANSETRON 4 MG/1
4 TABLET, ORALLY DISINTEGRATING ORAL EVERY 8 HOURS PRN
Status: DISCONTINUED | OUTPATIENT
Start: 2024-06-26 | End: 2024-06-28 | Stop reason: HOSPADM

## 2024-06-26 RX ORDER — FENTANYL CITRATE 50 UG/ML
INJECTION, SOLUTION INTRAMUSCULAR; INTRAVENOUS PRN
Status: DISCONTINUED | OUTPATIENT
Start: 2024-06-26 | End: 2024-06-26 | Stop reason: SDUPTHER

## 2024-06-26 RX ORDER — MIDAZOLAM HYDROCHLORIDE 1 MG/ML
INJECTION INTRAMUSCULAR; INTRAVENOUS PRN
Status: DISCONTINUED | OUTPATIENT
Start: 2024-06-26 | End: 2024-06-26 | Stop reason: SDUPTHER

## 2024-06-26 RX ORDER — BUPIVACAINE HYDROCHLORIDE 2.5 MG/ML
INJECTION, SOLUTION EPIDURAL; INFILTRATION; INTRACAUDAL PRN
Status: DISCONTINUED | OUTPATIENT
Start: 2024-06-26 | End: 2024-06-26 | Stop reason: SDUPTHER

## 2024-06-26 RX ORDER — LIDOCAINE HYDROCHLORIDE ANHYDROUS AND DEXTROSE MONOHYDRATE 5; 400 G/100ML; MG/100ML
INJECTION, SOLUTION INTRAVENOUS CONTINUOUS PRN
Status: DISCONTINUED | OUTPATIENT
Start: 2024-06-26 | End: 2024-06-26 | Stop reason: SDUPTHER

## 2024-06-26 RX ORDER — NALOXONE HYDROCHLORIDE 0.4 MG/ML
INJECTION, SOLUTION INTRAMUSCULAR; INTRAVENOUS; SUBCUTANEOUS PRN
Status: DISCONTINUED | OUTPATIENT
Start: 2024-06-26 | End: 2024-06-26 | Stop reason: HOSPADM

## 2024-06-26 RX ORDER — LIDOCAINE HYDROCHLORIDE 10 MG/ML
1 INJECTION, SOLUTION EPIDURAL; INFILTRATION; INTRACAUDAL; PERINEURAL
Status: DISCONTINUED | OUTPATIENT
Start: 2024-06-26 | End: 2024-06-26 | Stop reason: HOSPADM

## 2024-06-26 RX ORDER — SODIUM CHLORIDE 0.9 % (FLUSH) 0.9 %
5-40 SYRINGE (ML) INJECTION EVERY 12 HOURS SCHEDULED
Status: DISCONTINUED | OUTPATIENT
Start: 2024-06-26 | End: 2024-06-28 | Stop reason: HOSPADM

## 2024-06-26 RX ORDER — SODIUM CHLORIDE 0.9 % (FLUSH) 0.9 %
5-40 SYRINGE (ML) INJECTION PRN
Status: DISCONTINUED | OUTPATIENT
Start: 2024-06-26 | End: 2024-06-28 | Stop reason: HOSPADM

## 2024-06-26 RX ORDER — ONDANSETRON 2 MG/ML
4 INJECTION INTRAMUSCULAR; INTRAVENOUS EVERY 6 HOURS PRN
Status: DISCONTINUED | OUTPATIENT
Start: 2024-06-26 | End: 2024-06-28 | Stop reason: HOSPADM

## 2024-06-26 RX ORDER — ACETAMINOPHEN 325 MG/1
650 TABLET ORAL EVERY 6 HOURS
Status: DISCONTINUED | OUTPATIENT
Start: 2024-06-26 | End: 2024-06-28 | Stop reason: HOSPADM

## 2024-06-26 RX ORDER — POTASSIUM CHLORIDE 750 MG/1
40 TABLET, FILM COATED, EXTENDED RELEASE ORAL PRN
Status: DISCONTINUED | OUTPATIENT
Start: 2024-06-26 | End: 2024-06-28 | Stop reason: HOSPADM

## 2024-06-26 RX ORDER — MORPHINE SULFATE 1 MG/ML
INJECTION, SOLUTION EPIDURAL; INTRATHECAL; INTRAVENOUS PRN
Status: DISCONTINUED | OUTPATIENT
Start: 2024-06-26 | End: 2024-06-26 | Stop reason: SDUPTHER

## 2024-06-26 RX ADMIN — MORPHINE SULFATE 0.15 MG: 1 INJECTION, SOLUTION EPIDURAL; INTRATHECAL; INTRAVENOUS at 08:00

## 2024-06-26 RX ADMIN — MIDAZOLAM HYDROCHLORIDE 2 MG: 1 INJECTION, SOLUTION INTRAMUSCULAR; INTRAVENOUS at 07:52

## 2024-06-26 RX ADMIN — SODIUM CHLORIDE, POTASSIUM CHLORIDE, SODIUM LACTATE AND CALCIUM CHLORIDE: 600; 310; 30; 20 INJECTION, SOLUTION INTRAVENOUS at 12:18

## 2024-06-26 RX ADMIN — Medication 50 MG: at 08:55

## 2024-06-26 RX ADMIN — FENTANYL CITRATE 100 MCG: 50 INJECTION, SOLUTION INTRAMUSCULAR; INTRAVENOUS at 08:03

## 2024-06-26 RX ADMIN — CEFOXITIN SODIUM 2000 MG: 2 POWDER, FOR SOLUTION INTRAVENOUS at 08:34

## 2024-06-26 RX ADMIN — PROPOFOL 20 MG: 10 INJECTION, EMULSION INTRAVENOUS at 09:16

## 2024-06-26 RX ADMIN — ACETAMINOPHEN 650 MG: 325 TABLET ORAL at 21:32

## 2024-06-26 RX ADMIN — DEXAMETHASONE SODIUM PHOSPHATE 4 MG: 4 INJECTION INTRA-ARTICULAR; INTRALESIONAL; INTRAMUSCULAR; INTRAVENOUS; SOFT TISSUE at 08:17

## 2024-06-26 RX ADMIN — ACETAMINOPHEN 1000 MG: 500 TABLET ORAL at 06:52

## 2024-06-26 RX ADMIN — CELECOXIB 200 MG: 100 CAPSULE ORAL at 06:51

## 2024-06-26 RX ADMIN — ENOXAPARIN SODIUM 40 MG: 100 INJECTION SUBCUTANEOUS at 21:00

## 2024-06-26 RX ADMIN — ACETAMINOPHEN 650 MG: 325 TABLET ORAL at 15:27

## 2024-06-26 RX ADMIN — ROCURONIUM BROMIDE 50 MG: 10 INJECTION, SOLUTION INTRAVENOUS at 08:03

## 2024-06-26 RX ADMIN — LIDOCAINE HYDROCHLORIDE 2 MG/KG/HR: 4 INJECTION, SOLUTION INTRAVENOUS at 08:17

## 2024-06-26 RX ADMIN — SODIUM CHLORIDE, POTASSIUM CHLORIDE, SODIUM LACTATE AND CALCIUM CHLORIDE: 600; 310; 30; 20 INJECTION, SOLUTION INTRAVENOUS at 07:52

## 2024-06-26 RX ADMIN — MAGNESIUM SULFATE HEPTAHYDRATE 2000 MG: 40 INJECTION, SOLUTION INTRAVENOUS at 08:17

## 2024-06-26 RX ADMIN — DEXMEDETOMIDINE 6 MCG: 100 INJECTION, SOLUTION INTRAVENOUS at 09:39

## 2024-06-26 RX ADMIN — PROPOFOL 50 MCG/KG/MIN: 10 INJECTION, EMULSION INTRAVENOUS at 08:09

## 2024-06-26 RX ADMIN — SODIUM CHLORIDE, SODIUM LACTATE, POTASSIUM CHLORIDE, CALCIUM CHLORIDE: 600; 310; 30; 20 INJECTION, SOLUTION INTRAVENOUS at 08:17

## 2024-06-26 RX ADMIN — ROCURONIUM BROMIDE 50 MG: 10 INJECTION, SOLUTION INTRAVENOUS at 08:55

## 2024-06-26 RX ADMIN — SUGAMMADEX 200 MG: 100 INJECTION, SOLUTION INTRAVENOUS at 09:35

## 2024-06-26 RX ADMIN — ONDANSETRON 4 MG: 2 INJECTION INTRAMUSCULAR; INTRAVENOUS at 09:31

## 2024-06-26 RX ADMIN — EPHEDRINE SULFATE 10 MG: 5 INJECTION INTRAVENOUS at 08:49

## 2024-06-26 RX ADMIN — SODIUM CHLORIDE, PRESERVATIVE FREE 10 ML: 5 INJECTION INTRAVENOUS at 10:43

## 2024-06-26 RX ADMIN — NALOXEGOL OXALATE 25 MG: 25 TABLET, FILM COATED ORAL at 06:52

## 2024-06-26 RX ADMIN — BUPIVACAINE HYDROCHLORIDE 1 ML: 2.5 INJECTION, SOLUTION EPIDURAL; INFILTRATION; INTRACAUDAL; PERINEURAL at 08:00

## 2024-06-26 RX ADMIN — SODIUM CHLORIDE, PRESERVATIVE FREE 10 ML: 5 INJECTION INTRAVENOUS at 21:32

## 2024-06-26 RX ADMIN — SODIUM CHLORIDE, POTASSIUM CHLORIDE, SODIUM LACTATE AND CALCIUM CHLORIDE: 600; 310; 30; 20 INJECTION, SOLUTION INTRAVENOUS at 06:54

## 2024-06-26 RX ADMIN — LIDOCAINE HYDROCHLORIDE 100 MG: 20 INJECTION, SOLUTION EPIDURAL; INFILTRATION; INTRACAUDAL; PERINEURAL at 08:03

## 2024-06-26 RX ADMIN — HYDROMORPHONE HYDROCHLORIDE 0.5 MG: 1 INJECTION, SOLUTION INTRAMUSCULAR; INTRAVENOUS; SUBCUTANEOUS at 10:16

## 2024-06-26 ASSESSMENT — PAIN DESCRIPTION - ORIENTATION
ORIENTATION: RIGHT;LOWER
ORIENTATION: LOWER;RIGHT

## 2024-06-26 ASSESSMENT — PAIN DESCRIPTION - PAIN TYPE
TYPE: SURGICAL PAIN

## 2024-06-26 ASSESSMENT — PAIN - FUNCTIONAL ASSESSMENT
PAIN_FUNCTIONAL_ASSESSMENT: PREVENTS OR INTERFERES SOME ACTIVE ACTIVITIES AND ADLS

## 2024-06-26 ASSESSMENT — PAIN DESCRIPTION - LOCATION
LOCATION: ABDOMEN

## 2024-06-26 ASSESSMENT — PAIN DESCRIPTION - DESCRIPTORS
DESCRIPTORS: BURNING;SORE
DESCRIPTORS: SORE

## 2024-06-26 ASSESSMENT — PAIN SCALES - GENERAL
PAINLEVEL_OUTOF10: 2
PAINLEVEL_OUTOF10: 0
PAINLEVEL_OUTOF10: 2
PAINLEVEL_OUTOF10: 4
PAINLEVEL_OUTOF10: 0
PAINLEVEL_OUTOF10: 3

## 2024-06-26 ASSESSMENT — PAIN DESCRIPTION - ONSET
ONSET: ON-GOING

## 2024-06-26 ASSESSMENT — PAIN DESCRIPTION - FREQUENCY
FREQUENCY: CONTINUOUS
FREQUENCY: CONTINUOUS

## 2024-06-26 NOTE — PERIOP NOTE
TRANSFER - OUT REPORT:    Verbal report given to SHEREEN Reina (Charge nurse) on Pily Howell  being transferred to [facility] for routine post-op       Report consisted of patient's Situation, Background, Assessment and   Recommendations(SBAR).     Information from the following report(s) Nurse Handoff Report, Cardiac Rhythm NSR, and Neuro Assessment was reviewed with the receiving nurse.           Lines:   Peripheral IV 06/26/24 Posterior;Right Hand (Active)   Site Assessment Clean, dry & intact 06/26/24 1200   Line Status Infusing 06/26/24 1200   Line Care Connections checked and tightened 06/26/24 1200   Phlebitis Assessment No symptoms 06/26/24 1200   Infiltration Assessment 0 06/26/24 1200   Alcohol Cap Used Yes 06/26/24 1200   Dressing Status Clean, dry & intact 06/26/24 1200   Dressing Type Transparent 06/26/24 1200       Peripheral IV 06/26/24 Left;Posterior Hand (Active)   Site Assessment Clean, dry & intact 06/26/24 1200   Line Status Normal saline locked 06/26/24 1200   Line Care Connections checked and tightened 06/26/24 1200   Phlebitis Assessment No symptoms 06/26/24 1200   Infiltration Assessment 0 06/26/24 1200   Alcohol Cap Used Yes 06/26/24 1200   Dressing Status Clean, dry & intact 06/26/24 1200   Dressing Type Transparent 06/26/24 1200        Opportunity for questions and clarification was provided.      Patient transported with:  Registered Nurse

## 2024-06-26 NOTE — ANESTHESIA POSTPROCEDURE EVALUATION
Department of Anesthesiology  Postprocedure Note    Patient: Pily Howell  MRN: [id number]  YOB: 1970  Date of evaluation: 6/26/2024    Procedure Summary       Date: 06/26/24 Room / Location: [facility] MAIN OR  / [facility] MAIN OR    Anesthesia Start: 0752 Anesthesia Stop: 1000    Procedure: OPEN ILEOSTOMY REVERSAL (E R A S) (Abdomen) Diagnosis:       Colostomy care (HCC)      (Colostomy care (HCC) [Z43.3])    Surgeons: Francheska Buchanan MD Responsible Provider: Mohit Gomez MD    Anesthesia Type: General, Spinal ASA Status: 2            Anesthesia Type: General, Spinal    Martínez Phase I: Martínez Score: 10    Martínez Phase II:      Anesthesia Post Evaluation    Patient location during evaluation: PACU  Patient participation: complete - patient participated  Level of consciousness: awake  Airway patency: patent  Nausea & Vomiting: no vomiting and no nausea  Cardiovascular status: hemodynamically stable  Respiratory status: acceptable  Hydration status: stable  Pain management: adequate    No notable events documented.

## 2024-06-26 NOTE — OP NOTE
[facility]          [address] [place]  [address]                            OPERATIVE REPORT      PATIENT NAME: RONALD PICKARD             : 1970  MED REC NO: [id number]                       ROOM: 409  ACCOUNT NO: [id number]                       ADMIT DATE: 2024  PROVIDER: Francheska Buchanan MD    DATE OF SERVICE:  2024    PREOPERATIVE DIAGNOSES:  Ileostomy.    POSTOPERATIVE DIAGNOSES:  Ileostomy.    PROCEDURES PERFORMED:  Ileostomy reversal.    SURGEON:  Francheska Buchanan MD    ASSISTANT:  Kike    ANESTHESIA:  General    ESTIMATED BLOOD LOSS:  minimal    SPECIMENS REMOVED:  Ileostomy.    INTRAOPERATIVE FINDINGS:  ileostomy, normal small bowel     COMPLICATIONS:  None.    IMPLANTS:  none    INDICATIONS:  The patient is a 54-year-old woman who has previously undergone multiple previous abdominal surgeries related to perforated diverticulitis.  She is here today for reversal of her diverting ileostomy.    DESCRIPTION OF PROCEDURE:  After obtaining informed consent, the patient was brought to the operating room, where she was laid supine on the operating table.  After induction of general endotracheal anesthesia, the patient was prepped and draped in standard fashion.  2 g of ceftriaxone was administered as preoperative antibiotic.  With this complete, using electrocautery, the ostomy was freed from the skin edges using combination of blunt dissection as well as cautery.  The small bowel was completely freed from the fascial edges.  The ostomy was then amputated and a SARKIS stapler was placed on either limb of the small bowel.  With this anastomosis complete, a TA stapler was used to close the common enterotomy site.  The mesenteric defect was closed using a running 3-0 Vicryl in subcuticular fashion, and this continued to oversew the staple line.  With this complete, the bowel was dropped back within the abdominal cavity.  The fascia  at the ostomy site was closed using a running #1 PDS suture.  The ostomy site was irrigated with normal saline, and using a 2-0 Vicryl, the dermal layer was closed in a pursestring fashion.  A Betadine-soaked wick was placed in the center.  The patient tolerated the procedure well.  There were no complications.  All instrument, needle, and sponge counts were correct at the end of the case.  The patient was transferred to the recovery area in stable condition.    CLINICAL SERVICE:  General Surgery.    DRAINS AND TUBES:  None.        MATTEO NORIEGA MD      MLM/AQS  D:  06/26/2024 10:56:57  T:  06/26/2024 16:06:09  JOB #:  [id number]

## 2024-06-26 NOTE — ANESTHESIA PRE PROCEDURE
abscess T81.43XA   • Penicillin allergy Z88.0   • Seizure disorder ([facility]) G40.909   • Enterococcal bacteremia R78.81, B95.2   • Leukocytosis D72.829   • Diverticulitis K57.92         Past Medical History:        Diagnosis Date   • Chicken pox 1976   • Contact dermatitis and eczema due to cause    • Diverticulitis    • History of blood transfusion    • History of vascular access device 12/27/2023    [facility] VAT 4fr single PICC R basilic 39cm for LT abx   • Muscle pain     neck and shoulder   • Seizure ([facility]) 09/28/2023    ? seizure with syncopal episode       Past Surgical History:        Procedure Laterality Date   • COLOSTOMY CLOSURE N/A 2/21/2024    exploratory laparotomy reversal of ostomy, lysis of adhesions. performed by Francheska Buchanan MD at [facility] MAIN OR   • LAPAROSCOPY N/A 12/18/2023    EXPLORATORY LAPAROTOMY, PARTIAL COLECTOMY, OMENECTOMY, ABDOMINAL WASHOUT, CREATION OF OSTOMY performed by Francheska Buchanan MD at [facility] MAIN OR   • LAPAROTOMY N/A 12/10/2023    LAPAROTOMY EXPLORATORY, sigmoid colectomy performed by Heather Galindo MD at [facility] OR   • LAPAROTOMY N/A 2/28/2024    LAPAROTOMY EXPLORATORY, REPAIR OF BOWEL PERFORATION, CREATION OF DIVERTING PERIOSTOMY performed by Francheska Buchanan MD at [facility] OR   • XR MIDLINE EQUAL OR GREATER THAN 5 YEARS  3/7/2024    XR MIDLINE EQUAL OR GREATER THAN 5 YEARS 3/7/2024 [facility] RADIOLOGY       Social History:    Social History     Tobacco Use   • Smoking status: Never   • Smokeless tobacco: Never   Substance Use Topics   • Alcohol use: Not Currently                                Counseling given: Not Answered      Vital Signs (Current):   Vitals:    06/26/24 0626   Weight: 93.3 kg (205 lb 11 oz)   Height: 1.664 m (5' 5.51\")                                              BP Readings from Last 3 Encounters:   06/17/24 113/76   03/30/24 117/83   03/11/24 123/74       NPO Status:                                                                                 BMI:   Wt

## 2024-06-26 NOTE — ANESTHESIA PROCEDURE NOTES
Spinal Block    Patient location during procedure: OR  End time: 6/26/2024 8:00 AM  Reason for block: post-op pain management  Staffing  Performed: resident/CRNA   Anesthesiologist: Ben Bishop MD  Resident/CRNA: Earnest De Los Santos APRN - CRNA  Performed by: Earnest De Los Santos APRN - CRNA  Authorized by: Ben Bishop MD    Spinal Block  Patient position: sitting  Prep: DuraPrep  Patient monitoring: continuous pulse ox and frequent blood pressure checks  Approach: midline  Location: L3/L4  Provider prep: mask and sterile gloves  Needle  Needle type: Pencan   Needle gauge: 25 G  Needle length: 3.5 in  Assessment  Swirl obtained: Yes  CSF: clear  Attempts: 1  Hemodynamics: stable  Preanesthetic Checklist  Completed: patient identified, IV checked, site marked, risks and benefits discussed, surgical/procedural consents, equipment checked, pre-op evaluation, timeout performed, anesthesia consent given, oxygen available, monitors applied/VS acknowledged, fire risk safety assessment completed and verbalized and blood product R/B/A discussed and consented

## 2024-06-27 PROCEDURE — 6360000002 HC RX W HCPCS: Performed by: SURGERY

## 2024-06-27 PROCEDURE — 1100000000 HC RM PRIVATE

## 2024-06-27 PROCEDURE — 6370000000 HC RX 637 (ALT 250 FOR IP): Performed by: SURGERY

## 2024-06-27 PROCEDURE — 2580000003 HC RX 258: Performed by: SURGERY

## 2024-06-27 PROCEDURE — 94761 N-INVAS EAR/PLS OXIMETRY MLT: CPT

## 2024-06-27 RX ORDER — POLYETHYLENE GLYCOL 3350 17 G/17G
17 POWDER, FOR SOLUTION ORAL DAILY
Status: DISCONTINUED | OUTPATIENT
Start: 2024-06-27 | End: 2024-06-28 | Stop reason: HOSPADM

## 2024-06-27 RX ADMIN — ACETAMINOPHEN 650 MG: 325 TABLET ORAL at 21:55

## 2024-06-27 RX ADMIN — SODIUM CHLORIDE, PRESERVATIVE FREE 10 ML: 5 INJECTION INTRAVENOUS at 21:56

## 2024-06-27 RX ADMIN — ACETAMINOPHEN 650 MG: 325 TABLET ORAL at 15:13

## 2024-06-27 RX ADMIN — ACETAMINOPHEN 650 MG: 325 TABLET ORAL at 04:07

## 2024-06-27 RX ADMIN — ENOXAPARIN SODIUM 40 MG: 100 INJECTION SUBCUTANEOUS at 21:00

## 2024-06-27 RX ADMIN — ACETAMINOPHEN 650 MG: 325 TABLET ORAL at 09:23

## 2024-06-27 RX ADMIN — SODIUM CHLORIDE, PRESERVATIVE FREE 10 ML: 5 INJECTION INTRAVENOUS at 09:23

## 2024-06-27 RX ADMIN — POLYETHYLENE GLYCOL 3350 17 G: 17 POWDER, FOR SOLUTION ORAL at 10:35

## 2024-06-27 NOTE — PLAN OF CARE
Problem: Pain  Goal: Verbalizes/displays adequate comfort level or baseline comfort level  6/27/2024 0958 by Merlin Sánchez RN  Outcome: Progressing  6/26/2024 2231 by Felicitas Heath RN  Outcome: Progressing     Problem: Safety - Adult  Goal: Free from fall injury  6/27/2024 0958 by Merlin Sánchez RN  Outcome: Progressing  6/26/2024 2231 by Felicitas Heath RN  Outcome: Progressing     Problem: Discharge Planning  Goal: Discharge to home or other facility with appropriate resources  6/27/2024 0958 by Merlin Sánchez RN  Outcome: Progressing  6/26/2024 2231 by Felicitas Heath RN  Outcome: Progressing

## 2024-06-27 NOTE — PLAN OF CARE
Problem: Pain  Goal: Verbalizes/displays adequate comfort level or baseline comfort level  6/26/2024 2231 by Felicitas Heath RN  Outcome: Progressing  6/26/2024 1622 by Merlin Sánchez RN  Outcome: Progressing     Problem: Safety - Adult  Goal: Free from fall injury  6/26/2024 2231 by Felicitas Heath RN  Outcome: Progressing  6/26/2024 1622 by Merlin Sánchez RN  Outcome: Progressing     Problem: Discharge Planning  Goal: Discharge to home or other facility with appropriate resources  6/26/2024 2231 by Felicitas Heath RN  Outcome: Progressing  6/26/2024 1622 by Merlin Sánchez RN  Outcome: Progressing

## 2024-06-27 NOTE — PROGRESS NOTES
SURGERY PROGRESS NOTE      Admit Date: 2024    POD 1 Day Post-Op    Procedure: Procedure(s):  OPEN ILEOSTOMY REVERSAL (E R A S)      Subjective:   Patient seen and examined this morning at bedside. She is doing well. Has been urinating. -BM. Pain is controlled with Tylenol. Denies nausea/vomiting. Tolerating clears. No new complaints.      Objective:     /71   Pulse 72   Temp 97.3 °F (36.3 °C) (Oral)   Resp 15   Ht 1.664 m (5' 5.51\")   Wt 93.3 kg (205 lb 11 oz)   SpO2 97%   BMI 33.70 kg/m²      Temp (24hrs), Av.6 °F (36.4 °C), Min:97.3 °F (36.3 °C), Max:98.2 °F (36.8 °C)      Physical Exam:     Abdomen:  Soft.  Appropriately tender, non-distended.  Incision C/D/I.       Assessment:     Principal Problem:    Ileostomy in place (HCC)  Resolved Problems:    * No resolved hospital problems. *      Plan/Recommendations/Medical Decision Making:   POD 1 ileostomy reversal  Wound dressing changed  Adv diet as tolerated, did well with clears, potentially fulls later today  Cont pain regimen      Vashti Irizarry PA-C

## 2024-06-27 NOTE — CARE COORDINATION
6/27/2024  2:03 PM    No diagnosis found.      General Risk Score: 5   Values used to calculate this score:    Points  Metrics       0        Age: 54       3        Hospital Admissions: 4       2        ED Visits: 2       0        Has Chronic Obstructive Pulmonary Disease: No       0        Has Diabetes: No       0        Has Congestive Heart Failure: No       0        Has Liver Disease: No       0        Has Depression: No       0        Current PCP: Marivel Sky, DO       0        Has Medicaid: No      CM reviewed EMR. No CM needs indicated at this time. Per attending, pt has no case management needs as she will not require wound care at DC. Pt up ad [facility]. Providers, if needs arise, please consult case management.        06/27/24 1403   Services At/After Discharge   Services At/After Discharge None   Mode of Transport at Discharge Other (see comment)   Confirm Follow Up Transport Family

## 2024-06-28 VITALS
BODY MASS INDEX: 33.06 KG/M2 | OXYGEN SATURATION: 98 % | HEIGHT: 66 IN | WEIGHT: 205.69 LBS | HEART RATE: 76 BPM | RESPIRATION RATE: 16 BRPM | SYSTOLIC BLOOD PRESSURE: 110 MMHG | TEMPERATURE: 98 F | DIASTOLIC BLOOD PRESSURE: 69 MMHG

## 2024-06-28 PROCEDURE — 6370000000 HC RX 637 (ALT 250 FOR IP): Performed by: SURGERY

## 2024-06-28 PROCEDURE — 94761 N-INVAS EAR/PLS OXIMETRY MLT: CPT

## 2024-06-28 PROCEDURE — 2700000000 HC OXYGEN THERAPY PER DAY

## 2024-06-28 PROCEDURE — 2580000003 HC RX 258: Performed by: SURGERY

## 2024-06-28 RX ORDER — TRAMADOL HYDROCHLORIDE 50 MG/1
50 TABLET ORAL EVERY 6 HOURS PRN
Qty: 12 TABLET | Refills: 0 | Status: SHIPPED | OUTPATIENT
Start: 2024-06-28 | End: 2024-07-01

## 2024-06-28 RX ADMIN — ACETAMINOPHEN 650 MG: 325 TABLET ORAL at 10:02

## 2024-06-28 RX ADMIN — POLYETHYLENE GLYCOL 3350 17 G: 17 POWDER, FOR SOLUTION ORAL at 10:03

## 2024-06-28 RX ADMIN — ACETAMINOPHEN 650 MG: 325 TABLET ORAL at 04:08

## 2024-06-28 RX ADMIN — SODIUM CHLORIDE, PRESERVATIVE FREE 10 ML: 5 INJECTION INTRAVENOUS at 10:03

## 2024-06-28 NOTE — PLAN OF CARE
Problem: Pain  Goal: Verbalizes/displays adequate comfort level or baseline comfort level  6/27/2024 2005 by Felicitas Heath RN  Outcome: Progressing  6/27/2024 0958 by Merlin Sánchez RN  Outcome: Progressing     Problem: Safety - Adult  Goal: Free from fall injury  6/27/2024 2005 by Felicitas Heath RN  Outcome: Progressing  6/27/2024 0958 by Merlin Sánchez RN  Outcome: Progressing     Problem: Discharge Planning  Goal: Discharge to home or other facility with appropriate resources  6/27/2024 2005 by Felicitas Heath RN  Outcome: Progressing  6/27/2024 0958 by Merlin Sánchez RN  Outcome: Progressing

## 2024-06-28 NOTE — PLAN OF CARE
Problem: Pain  Goal: Verbalizes/displays adequate comfort level or baseline comfort level  Outcome: HH/HSPC Progressing     Problem: Safety - Adult  Goal: Free from fall injury  Outcome: /HSPC Progressing     Problem: Discharge Planning  Goal: Discharge to home or other facility with appropriate resources  Outcome: HH/HSPC Progressing

## 2024-06-28 NOTE — DISCHARGE SUMMARY
Discharge Summary    Patient: Pily Howell               Sex: female          DOA: [unfilled]       YOB: 1970      Age:  54 y.o.        LOS:  LOS: 2 days                Admit Date: 6/26/2024    Discharge Date: 6/28/2024    Admission Diagnoses: Colostomy care ([facility]) [Z43.3]  Ileostomy in place ([facility]) [Z93.2]    Discharge Diagnoses:      Discharge Condition: Good    Hospital Course: Pt admitted for loop ileostomy reversal.  She had a typical post op operative course and is discharged home in good condition.  PE:  Gen: NAD  Resp: aerating well, no distress  Abd: soft appropriately tender, ostomy site healing    Consults: None    Significant Diagnostic Studies: none    Discharge Medications:     Current Discharge Medication List        START taking these medications    Details   traMADol (ULTRAM) 50 MG tablet Take 1 tablet by mouth every 6 hours as needed for Pain for up to 3 days. Intended supply: 3 days. Take lowest dose possible to manage pain Max Daily Amount: 200 mg  Qty: 12 tablet, Refills: 0    Comments: Reduce doses taken as pain becomes manageable  Associated Diagnoses: Ileostomy in place ([facility])           CONTINUE these medications which have NOT CHANGED    Details   Pediatric Multivit-Minerals (FLINTSTONES COMPLETE PO) Take by mouth daily      cetirizine (ZYRTEC) 10 MG tablet Take 1 tablet by mouth daily as needed (seasonal allergies)             Activity: activity as tolerated    Diet: regular diet    Wound Care: ok to shower, keep wound clean and covered    Follow-up: 2 weeks

## 2024-06-28 NOTE — CARE COORDINATION
Care Management Progress Note        Reason for Admission:   Colostomy care ([facility]) [Z43.3]  Ileostomy in place ([facility]) [Z93.2]  Procedure(s) (LRB):  OPEN ILEOSTOMY REVERSAL (E R A S) (N/A)  2 Days Post-Op      Patient Admission Status: Inpatient  RUR: Readmission Risk Score: 7.5      Hospitalization in the last 30 days (Readmission):  No          Transition of care plan:  Patient was discussed in IDR and is medically ready to discharge home.    Dispo: return home. No CM needs anticipated, please consult CM if needs arise.    Outpatient follow-up.    Pt's family to transport.            ___________________________________________   Nelsy Norton RN Case Manager  6/28/2024   11:38 AM

## 2025-05-04 NOTE — ED TRIAGE NOTES
Patient arrives to the ED, patient reports she's been feeling weak for the last two days and then felt dizzy on the way home from her mother's house. Patient has had several bowel surgeries and has had to be transfused several times, patient concerned she may be anemic.   
Teaching and training

## 2025-05-12 ENCOUNTER — OFFICE VISIT (OUTPATIENT)
Age: 55
End: 2025-05-12
Payer: COMMERCIAL

## 2025-05-12 VITALS
HEIGHT: 66 IN | WEIGHT: 268 LBS | BODY MASS INDEX: 43.07 KG/M2 | OXYGEN SATURATION: 95 % | SYSTOLIC BLOOD PRESSURE: 131 MMHG | HEART RATE: 89 BPM | TEMPERATURE: 98.3 F | RESPIRATION RATE: 18 BRPM | DIASTOLIC BLOOD PRESSURE: 82 MMHG

## 2025-05-12 DIAGNOSIS — K43.2 VENTRAL INCISIONAL HERNIA: Primary | ICD-10-CM

## 2025-05-12 PROCEDURE — 3017F COLORECTAL CA SCREEN DOC REV: CPT | Performed by: SURGERY

## 2025-05-12 PROCEDURE — 1036F TOBACCO NON-USER: CPT | Performed by: SURGERY

## 2025-05-12 PROCEDURE — G8417 CALC BMI ABV UP PARAM F/U: HCPCS | Performed by: SURGERY

## 2025-05-12 PROCEDURE — 99214 OFFICE O/P EST MOD 30 MIN: CPT | Performed by: SURGERY

## 2025-05-12 PROCEDURE — G8427 DOCREV CUR MEDS BY ELIG CLIN: HCPCS | Performed by: SURGERY

## 2025-05-12 ASSESSMENT — PATIENT HEALTH QUESTIONNAIRE - PHQ9
1. LITTLE INTEREST OR PLEASURE IN DOING THINGS: NOT AT ALL
SUM OF ALL RESPONSES TO PHQ QUESTIONS 1-9: 0
2. FEELING DOWN, DEPRESSED OR HOPELESS: NOT AT ALL
SUM OF ALL RESPONSES TO PHQ QUESTIONS 1-9: 0

## 2025-05-12 NOTE — PROGRESS NOTES
Identified pt with two pt identifiers (name and ). Reviewed chart in preparation for visit and have obtained necessary documentation.    Pily Howell is a 55 y.o. female  Chief Complaint   Patient presents with    New Patient    Hernia    Abdominal Pain     Pain at ileostomy site     /82 (BP Site: Left Upper Arm, Patient Position: Sitting, BP Cuff Size: Large Adult)   Pulse 89   Temp 98.3 °F (36.8 °C) (Oral)   Resp 18   Ht 1.664 m (5' 5.51\")   Wt 121.6 kg (268 lb)   SpO2 95%   BMI 43.91 kg/m²     1. Have you been to the ER, urgent care clinic since your last visit?  Hospitalized since your last visit?no    2. Have you seen or consulted any other health care providers outside of the [facility] since your last visit?  Include any pap smears or colon screening. no

## 2025-05-12 NOTE — PROGRESS NOTES
Surgery Progress Note    5/12/2025    had concerns including New Patient, Hernia, and Abdominal Pain (Pain at ileostomy site).     Subjective:     Patient is a 55 y.o. female well-known to me from multiple previous operations.  Her most recent operation was ileostomy reversal approximately a year ago.  She reports that recently she has been noticing some pain and discomfort at the site of her previous ileostomy site.  She reports that she has recently gained significant amount of weight and has noticed worsening discomfort at the end of her workday.  She is denies any nausea vomiting fevers chills or changes in bowel habits.  She is here today for evaluation and reassurance that there is no significant pathology.      Constitutional: No fever or chills  Neurologic: No headache  Eyes: No scleral icterus or irritated eyes  Nose: No nasal pain or drainage  Mouth: No oral lesions or sore throat  Cardiac: No palpations or chest pain  Pulmonary: No cough or shortness of breath  Gastrointestinal: No nausea, emesis, diarrhea, or constipation  Genitourinary: No dysuria  Musculoskeletal: No muscle or joint tenderness  Skin: No rashes or lesions  Psychiatric: No anxiety or depressed mood    Current Outpatient Medications   Medication Instructions    cetirizine (ZYRTEC) 10 MG tablet 1 tablet, DAILY PRN    Pediatric Multivit-Minerals (FLINTSTONES COMPLETE PO) DAILY       Allergies   Allergen Reactions    Guaifenesin Itching    Penicillins Rash     Age 11    **Tolerated Ancef Challenge        Past Medical History:   Diagnosis Date    Chicken pox 1976    Contact dermatitis and eczema due to cause     Diverticulitis     GERD (gastroesophageal reflux disease)     History of blood transfusion     History of vascular access device 12/27/2023    [facility] VAT 4fr single PICC R basilic 39cm for LT abx    Muscle pain     neck and shoulder    Obesity     Seizure (HCC) 09/28/2023    ? seizure with syncopal episode        Past Surgical History:

## 2025-05-22 ENCOUNTER — HOSPITAL ENCOUNTER (OUTPATIENT)
Facility: HOSPITAL | Age: 55
Discharge: HOME | End: 2025-05-25
Attending: SURGERY
Payer: COMMERCIAL

## 2025-05-22 DIAGNOSIS — K43.2 VENTRAL INCISIONAL HERNIA: ICD-10-CM

## 2025-05-22 PROCEDURE — 74176 CT ABD & PELVIS W/O CONTRAST: CPT

## 2025-05-22 RX ORDER — IOPAMIDOL 755 MG/ML
100 INJECTION, SOLUTION INTRAVASCULAR
Status: DISCONTINUED | OUTPATIENT
Start: 2025-05-22 | End: 2025-05-26 | Stop reason: HOSPADM

## 2025-05-22 RX ORDER — DIATRIZOATE MEGLUMINE AND DIATRIZOATE SODIUM 660; 100 MG/ML; MG/ML
30 SOLUTION ORAL; RECTAL
Status: DISCONTINUED | OUTPATIENT
Start: 2025-05-22 | End: 2025-05-26 | Stop reason: HOSPADM

## 2025-05-23 ENCOUNTER — TELEPHONE (OUTPATIENT)
Age: 55
End: 2025-05-23

## 2025-05-23 NOTE — TELEPHONE ENCOUNTER
Incoming call from patient in regards to a CT scan that Dr. Buchanan wanted patient to get. Patient's name and  was verified.     Patient understands that Dr. Buchanan wanted oral and IV but they could not get an IV. She was there for 4 hours, 2 nurses tried and ended up doing CT scan with oral contrast only. She just wanted to call and let Dr. Buchanan know. If call back is needed # is [phone]

## 2025-08-28 ENCOUNTER — OFFICE VISIT (OUTPATIENT)
Age: 55
End: 2025-08-28
Payer: COMMERCIAL

## 2025-08-28 VITALS
DIASTOLIC BLOOD PRESSURE: 81 MMHG | OXYGEN SATURATION: 95 % | HEIGHT: 65 IN | TEMPERATURE: 98.2 F | SYSTOLIC BLOOD PRESSURE: 131 MMHG | RESPIRATION RATE: 16 BRPM | HEART RATE: 84 BPM | WEIGHT: 277.6 LBS | BODY MASS INDEX: 46.25 KG/M2

## 2025-08-28 DIAGNOSIS — K43.9 VENTRAL HERNIA WITHOUT OBSTRUCTION OR GANGRENE: ICD-10-CM

## 2025-08-28 PROCEDURE — G8427 DOCREV CUR MEDS BY ELIG CLIN: HCPCS | Performed by: SURGERY

## 2025-08-28 PROCEDURE — 3017F COLORECTAL CA SCREEN DOC REV: CPT | Performed by: SURGERY

## 2025-08-28 PROCEDURE — G8417 CALC BMI ABV UP PARAM F/U: HCPCS | Performed by: SURGERY

## 2025-08-28 PROCEDURE — 99213 OFFICE O/P EST LOW 20 MIN: CPT | Performed by: SURGERY

## 2025-08-28 PROCEDURE — 1036F TOBACCO NON-USER: CPT | Performed by: SURGERY

## 2025-08-28 RX ORDER — SODIUM CHLORIDE, SODIUM LACTATE, POTASSIUM CHLORIDE, CALCIUM CHLORIDE 600; 310; 30; 20 MG/100ML; MG/100ML; MG/100ML; MG/100ML
INJECTION, SOLUTION INTRAVENOUS CONTINUOUS
OUTPATIENT
Start: 2025-08-28

## 2025-08-28 RX ORDER — SODIUM CHLORIDE 0.9 % (FLUSH) 0.9 %
5-40 SYRINGE (ML) INJECTION PRN
OUTPATIENT
Start: 2025-08-28

## 2025-08-28 RX ORDER — SODIUM CHLORIDE 0.9 % (FLUSH) 0.9 %
5-40 SYRINGE (ML) INJECTION EVERY 12 HOURS SCHEDULED
OUTPATIENT
Start: 2025-08-28

## 2025-08-28 RX ORDER — SODIUM CHLORIDE 9 MG/ML
INJECTION, SOLUTION INTRAVENOUS PRN
OUTPATIENT
Start: 2025-08-28

## 2025-08-28 RX ORDER — CYCLOBENZAPRINE HCL 10 MG
TABLET ORAL
COMMUNITY
Start: 2025-08-25

## 2025-08-28 RX ORDER — DICLOFENAC SODIUM 75 MG/1
TABLET, DELAYED RELEASE ORAL
COMMUNITY
Start: 2025-08-25

## 2025-08-28 ASSESSMENT — PATIENT HEALTH QUESTIONNAIRE - PHQ9
SUM OF ALL RESPONSES TO PHQ QUESTIONS 1-9: 0
2. FEELING DOWN, DEPRESSED OR HOPELESS: NOT AT ALL
SUM OF ALL RESPONSES TO PHQ QUESTIONS 1-9: 0
1. LITTLE INTEREST OR PLEASURE IN DOING THINGS: NOT AT ALL
SUM OF ALL RESPONSES TO PHQ QUESTIONS 1-9: 0
SUM OF ALL RESPONSES TO PHQ QUESTIONS 1-9: 0

## 2025-09-02 ENCOUNTER — TELEPHONE (OUTPATIENT)
Age: 55
End: 2025-09-02

## 2025-09-02 PROBLEM — K43.9 VENTRAL HERNIA WITHOUT OBSTRUCTION OR GANGRENE: Status: ACTIVE | Noted: 2025-09-02

## (undated) DEVICE — CANISTER, RIGID, 3000CC: Brand: MEDLINE INDUSTRIES, INC.

## (undated) DEVICE — SUTURE PDS II SZ 1 L36IN ABSRB VLT CT L40MM 1/2 CIR TAPR Z359T

## (undated) DEVICE — DRAPE FLD WRM W44XL66IN C6L FOR INTRATEMP SYS THERMABASIN

## (undated) DEVICE — PAD,ABDOMINAL,5"X9",ST,LF,25/BX: Brand: MEDLINE INDUSTRIES, INC.

## (undated) DEVICE — SUTURE VCRL SZ 4-0 L18IN ABSRB UD L19MM PS-2 3/8 CIR PRIM J496H

## (undated) DEVICE — TROCAR: Brand: KII® SLEEVE

## (undated) DEVICE — Device

## (undated) DEVICE — SUTURE PDS + SZ 1 L96IN ABSRB VLT L65MM TP-1 1/2 CIR PDP880G

## (undated) DEVICE — SUTURE PERMAHAND SZ 2-0 L30IN NONABSORBABLE BLK SILK W/O A305H

## (undated) DEVICE — TELFA NON-ADHERENT PADS PREPAK: Brand: TELFA

## (undated) DEVICE — SUTURE VICRYL SZ 0 L36IN ABSRB UD L40MM CT 1/2 CIR TAPERPOINT J958H

## (undated) DEVICE — SUTURE PDS II SZ 1 L96IN ABSRB VLT TP-1 L65MM 1/2 CIR Z880G

## (undated) DEVICE — SUTURE ETHBND EXCEL SZ 5 L30IN NONABSORBABLE GRN L40MM V-37 MB66G

## (undated) DEVICE — RELOAD STPL L60MM H1-2.6MM MESENTERY THN TISS WHT 6 ROW

## (undated) DEVICE — COVER LT HNDL PLAS RIG 1 PER PK

## (undated) DEVICE — SUTURE PROL SZ 2-0 L30IN NONABSORBABLE BLU L26MM SH 1/2 CIR 8833H

## (undated) DEVICE — RELOAD STPL L75MM OPN H3.8MM CLS 1.5MM WIRE DIA0.2MM REG

## (undated) DEVICE — COLON CLOSING PACK: Brand: MEDLINE INDUSTRIES, INC.

## (undated) DEVICE — RESERVOIR,SUCTION,100CC,SILICONE: Brand: MEDLINE

## (undated) DEVICE — SOLUTION IRRIG 1000ML STRL H2O USP PLAS POUR BTL

## (undated) DEVICE — EXTRA LARGE, DISPOSABLE C-SECTION PROTECTOR - RETRACTOR: Brand: ALEXIS ® O C-SECTION PROTECTOR - RETRACTOR

## (undated) DEVICE — BLADELESS OBTURATOR: Brand: WECK VISTA

## (undated) DEVICE — YANKAUER,POOLE TIP,STERILE,50/CS: Brand: MEDLINE

## (undated) DEVICE — AGENT HEMOSTATIC SURG NU-KNIT ABS 3X4IN WOVEN KNIT 12/BX

## (undated) DEVICE — STAPLER INT L75MM CUT LN L73MM STPL LN L77MM BLU B FRM 8

## (undated) DEVICE — INTENDED FOR TISSUE SEPARATION, AND OTHER PROCEDURES THAT REQUIRE A SHARP SURGICAL BLADE TO PUNCTURE OR CUT.: Brand: BARD-PARKER ® CARBON RIB-BACK BLADES

## (undated) DEVICE — LAPAROTOMY-SFMC: Brand: MEDLINE INDUSTRIES, INC.

## (undated) DEVICE — BOWL MED L 32OZ PLAS W/ MOLD GRAD EZ OPN PEEL PCH

## (undated) DEVICE — ACCESS PLATFORM FOR MINIMALLY INVASIVE SURGERY.: Brand: GELPORT® LAPAROSCOPIC  SYSTEM

## (undated) DEVICE — SUTURE PDS II SZ 1 L96IN ABSRB VLT XLH L70MM 1/2 CIR Z881G

## (undated) DEVICE — RELOAD STPL L60MM H1.5-3.6MM REG TISS BLU GRIPPING SURF B

## (undated) DEVICE — STAPLER 60MM POWERED ECHELON 3000  SHORT 340MM

## (undated) DEVICE — AIRSEAL 5 MM ACCESS PORT AND LOW PROFILE OBTURATOR WITH BLADELESS OPTICAL TIP, 120 MM LENGTH: Brand: AIRSEAL

## (undated) DEVICE — SOLUTION IRRIG 1000ML 0.9% SOD CHL USP POUR PLAS BTL

## (undated) DEVICE — LIQUIBAND RAPID ADHESIVE 36/CS 0.8ML: Brand: MEDLINE

## (undated) DEVICE — STAPLER INT CUT LN 40MM STPL 51MM GRN CRV HD B FRM

## (undated) DEVICE — INSUFFLATION NEEDLE TO ESTABLISH PNEUMOPERITONEUM.: Brand: INSUFFLATION NEEDLE

## (undated) DEVICE — STRIP,CLOSURE,WOUND,MEDI-STRIP,1/2X4: Brand: MEDLINE

## (undated) DEVICE — AGENT HEMSTAT W4XL4IN OXIDIZED REGENERATED CELOS STRUCTURED

## (undated) DEVICE — SUTURE ABSORBABLE MONOFILAMENT 0 CTX 60 IN VIO PDS + PDP990G

## (undated) DEVICE — SPONGE LAP W18XL18IN WHT COT 4 PLY FLD STRUNG RADPQ DISP ST 2 PER PACK

## (undated) DEVICE — SUTURE PERMAHAND SZ 3-0 L30IN NONABSORBABLE BLK L26MM SH C017D

## (undated) DEVICE — SUTURE ABSORBABLE BRAIDED 0 CT 36 IN DA UD VICRYL VCP958H

## (undated) DEVICE — GLOVE SURG SZ 65 THK91MIL LTX FREE SYN POLYISOPRENE

## (undated) DEVICE — DISPOSABLE GRASPER CARTRIDGE: Brand: DIRECT DRIVE REPOSABLE GRASPERS

## (undated) DEVICE — CATHETER,URETHRAL,POLY,LATEX FREE,14FR: Brand: MEDLINE

## (undated) DEVICE — STAPLER INT L60MM REG TISS BLU B FRM 8 FIRING 2 ROW AUTO

## (undated) DEVICE — SPONGE LAPAROTOMY W18XL18IN WHITE STRUNG RADIOPAQUE STERILE

## (undated) DEVICE — SEALER TISS L35CM DIA5MM CRV JAW ARTC ADV BPLR ENSEAL G2

## (undated) DEVICE — DRAIN SURG 19FR 0.25IN SIL RND W/ TRCR INDIC DOT RADPQ FULL

## (undated) DEVICE — YANKAUER,BULB TIP,W/O VENT,RIGID,STERILE: Brand: MEDLINE

## (undated) DEVICE — RELOAD STPL 40MM H1.5-4.7MM WIRE 0.2MM REG TISS GRN CRV

## (undated) DEVICE — SUTURE SZ 0 27IN 5/8 CIR UR-6  TAPER PT VIOLET ABSRB VICRYL J603H

## (undated) DEVICE — SURGICAL PROCEDURE PACK TISS 3X5 IN ABSORBABLE SEPRAFILM

## (undated) DEVICE — SUTURE VCRL + SZ 2-0 L27IN ABSRB WHT SH 1/2 CIR TAPERCUT VCP417H

## (undated) DEVICE — POUCH LIQUID IODINE POVIDONE APLICARE 0.75 OZ

## (undated) DEVICE — LARGE, DISPOSABLE ALEXIS O C-SECTION PROTECTOR - RETRACTOR: Brand: ALEXIS ® O C-SECTION PROTECTOR - RETRACTOR

## (undated) DEVICE — SOLUTION IRRIG 500ML 0.9% SOD CHLO USP POUR PLAS BTL

## (undated) DEVICE — BLANKET WRM W35.4XL86.6IN FULL UNDERBODY + FORC AIR

## (undated) DEVICE — SOLUTION SCRB 2OZ 10% POVIDONE IOD ANTIMIC BTL

## (undated) DEVICE — 450 ML BOTTLE OF 0.05% CHLORHEXIDINE GLUCONATE IN 99.95% STERILE WATER FOR IRRIGATION, USP AND APPLICATOR.: Brand: IRRISEPT ANTIMICROBIAL WOUND LAVAGE

## (undated) DEVICE — SEALER TISS L20CM DIA13MM ADV BPLR L CRV JAW OPN APPRCH

## (undated) DEVICE — 3M™ IOBAN™ 2 ANTIMICROBIAL INCISE DRAPE 6650EZ: Brand: IOBAN™ 2

## (undated) DEVICE — VESSEL SEALER EXTEND: Brand: ENDOWRIST

## (undated) DEVICE — STERILE-Z MAYO STAND COVERS CLEAR POLYETHYLENE STERILE UNIVERSAL FIT 20 PER CASE: Brand: STERILE-Z

## (undated) DEVICE — SUTURE PDS II SZ 0 L36IN ABSRB VLT L36MM CT-1 1/2 CIR Z346H

## (undated) DEVICE — SPONGE GZ W4XL4IN COT 12 PLY TYP VII WVN C FLD DSGN STERILE

## (undated) DEVICE — GLOVE SURG SZ 6 THK91MIL LTX FREE SYN POLYISOPRENE ANTI

## (undated) DEVICE — GLOVE ORANGE PI 7 1/2   MSG9075

## (undated) DEVICE — 3M™ TEGADERM™ TRANSPARENT FILM DRESSING FRAME STYLE, 1626W, 4 IN X 4-3/4 IN (10 CM X 12 CM), 50/CT 4CT/CASE: Brand: 3M™ TEGADERM™

## (undated) DEVICE — TRANSFER SET 3": Brand: MEDLINE INDUSTRIES, INC.

## (undated) DEVICE — RETRACTOR 135X20MM WITH BUILT-IN SINGLE-USE MULTI-LED LIGHT SOURCE - STERILE: Brand: ONETRAC LX

## (undated) DEVICE — TOWEL,OR,DSP,ST,BLUE,STD,4/PK,20PK/CS: Brand: MEDLINE

## (undated) DEVICE — ARM DRAPE

## (undated) DEVICE — APPLIER CLP M L11IN TI MULT RNG HNDL 30 CLP STR LIGACLP

## (undated) DEVICE — CLICKLINE SCISSORS INSERT: Brand: CLICKLINE

## (undated) DEVICE — SUTURE PERMAHAND SZ 0 L30IN NONABSORBABLE BLK L26MM SH 1/2 K834H

## (undated) DEVICE — LAPAROSCOPIC TROCAR SLEEVE/SINGLE USE: Brand: KII® OPTICAL ACCESS SYSTEM

## (undated) DEVICE — TRI-LUMEN FILTERED TUBE SET WITH ACTIVATED CHARCOAL FILTER: Brand: AIRSEAL

## (undated) DEVICE — GENERAL LAPAROSCOPY-SFMC: Brand: MEDLINE INDUSTRIES, INC.

## (undated) DEVICE — DRESSING FOAM W4XL10IN AG SIL ADH ANTIMIC POSTOP OPTIFOAM

## (undated) DEVICE — SUTURE PERMA-HAND SZ 2-0 L30IN NONABSORBABLE BLK L26MM SH K833H

## (undated) DEVICE — SPONGE DRN W4XL4IN RAYON/POLYESTER 6 PLY NONWOVEN PRECUT 2 PER PK

## (undated) DEVICE — SOLUTION IV 1000ML 0.9% SOD CHL PH 5 INJ USP VIAFLX PLAS

## (undated) DEVICE — 1LYRTR 16FR10ML100%SIL UMS SNP: Brand: MEDLINE INDUSTRIES, INC.

## (undated) DEVICE — SUTURE VCRL SZ 2-0 L27IN ABSRB UD L26MM SH 1/2 CIR J417H

## (undated) DEVICE — ELECTRO LUBE IS A SINGLE PATIENT USE DEVICE THAT IS INTENDED TO BE USED ON ELECTROSURGICAL ELECTRODES TO REDUCE STICKING.: Brand: KEY SURGICAL ELECTRO LUBE

## (undated) DEVICE — PENCIL ES CRD L10FT HND SWCHING ROCK SWCH W/ EDGE COAT BLDE

## (undated) DEVICE — REDUCER: Brand: ENDOWRIST

## (undated) DEVICE — Device: Brand: SENSURA MIO

## (undated) DEVICE — STAPLER SKIN H3.9MM WIRE DIA0.58MM CRWN 6.9MM 35 STPL ROT

## (undated) DEVICE — TUBING, SUCTION, 1/4" X 10', STRAIGHT: Brand: MEDLINE

## (undated) DEVICE — CONTAINER,SPECIMEN,3OZ,OR STRL: Brand: MEDLINE

## (undated) DEVICE — ROBOTIC GYN-SFMC: Brand: MEDLINE INDUSTRIES, INC.

## (undated) DEVICE — TIP COVER ACCESSORY

## (undated) DEVICE — SEAL

## (undated) DEVICE — SUTURE VICRYL + SZ 3-0 L27IN ABSRB UD L26MM SH 1/2 CIR VCP416H

## (undated) DEVICE — SUTURE PERMAHAND SZ 3-0 L18IN NONABSORBABLE BLK L26MM SH C013D

## (undated) DEVICE — DRAIN SURG W10XL20CM SIL SMOOTH FLAT 3/4 PERF DBL WRP

## (undated) DEVICE — SUTURE MERS SZ 2-0 L12IN NONABSORBABLE WHT L65MM BP-1 1/2 RS21

## (undated) DEVICE — TOWEL SURG W17XL27IN STD BLU COT NONFENESTRATED PREWASHED

## (undated) DEVICE — SUTURE VCRL SZ 3-0 L27IN ABSRB UD L26MM SH 1/2 CIR J416H

## (undated) DEVICE — DRAIN SURG WND 15 FR RND TIP SIL STRL LF DISP

## (undated) DEVICE — SYRINGE MED 10ML LUERLOCK TIP W/O SFTY DISP

## (undated) DEVICE — BLADE ES L6IN ELASTOMERIC COAT EXT DURABLE BEND UPTO 90DEG

## (undated) DEVICE — SPONGE,DRAIN,NONWVN,4"X4",6PLY,STRL,LF: Brand: MEDLINE

## (undated) DEVICE — DRESSING BORDERED ADH GZ UNIV GEN USE 8INX4IN AND 6INX2IN

## (undated) DEVICE — COVER,MAYO STAND,STERILE: Brand: MEDLINE

## (undated) DEVICE — SUTURE PDS II SZ 1 L36IN ABSRB VLT L48MM CTX 1/2 CIR Z371T

## (undated) DEVICE — SUTURE VCRL + SZ 3-0 L18IN ABSRB UD SH 1/2 CIR TAPERCUT NDL VCP864D

## (undated) DEVICE — SUTURE VICRYL + SZ 4-0 L27IN ABSRB UD PS-2 3/8 CIR REV CUT VCP426H

## (undated) DEVICE — PICO 7 10CM X 30CM: Brand: PICO™ 7

## (undated) DEVICE — PAD POSITIONING WNG STD KIT W/BODY STRP LF DISP

## (undated) DEVICE — STAPLER INT DIA29MM CLS STPL H1.5-2.2MM OPN LEG L5.2MM 26

## (undated) DEVICE — SUTURE NONABSORBABLE MONOFILAMENT 2-0 FS 18 IN ETHILON 664H

## (undated) DEVICE — JELLY,LUBE,STERILE,FLIP TOP,TUBE,4-OZ: Brand: MEDLINE

## (undated) DEVICE — SUTURE VCRL + SZ 3-0 L27IN ABSRB UD L26MM SH 1/2 CIR VCP416H

## (undated) DEVICE — SUTURE VCRL SZ 2-0 L36IN ABSRB UD L40MM CT 1/2 CIR J957H